# Patient Record
Sex: FEMALE | Race: WHITE | Employment: PART TIME | URBAN - METROPOLITAN AREA
[De-identification: names, ages, dates, MRNs, and addresses within clinical notes are randomized per-mention and may not be internally consistent; named-entity substitution may affect disease eponyms.]

---

## 2018-08-13 LAB — MAMMOGRAM: NORMAL

## 2018-12-21 NOTE — TELEPHONE ENCOUNTER
FUTURE VISIT INFORMATION      FUTURE VISIT INFORMATION:    Date: 12/28/18    Time: 0920    Location: [facility]TH[facility]  REFERRAL INFORMATION:    Referring provider:  DR CAAL    Referring providers clinic:  [facility]    Reason for visit/diagnosis  LBP     RECORDS REQUESTED FROM:       Clinic name Comments Records Status Imaging Status                                         RECORDS RECEIVED FROM: [facility]   DATE RECEIVED: 12/21/18   NOTES STATUS DETAILS   OFFICE NOTE from referring provider Care Everywhere 11/8/18*   OFFICE NOTE from other specialist N/A    DISCHARGE SUMMARY from hospital N/A    DISCHARGE REPORT from the ER N/A    OPERATIVE REPORT N/A    MEDICATION LIST Care Everywhere    IMPLANT RECORD/STICKER N/A    LABS     CBC/DIFF Care Everywhere    INJECTION In process 6/23/17*   US N/A    MRI Received 11/9/18*   CT SCAN N/A    XRAYS (IMAGES & REPORTS) N/A    TUMOR     PATHOLOGY  Slides & report N/A

## 2018-12-27 DIAGNOSIS — M54.50 LUMBAR PAIN: Primary | ICD-10-CM

## 2018-12-28 ENCOUNTER — OFFICE VISIT (OUTPATIENT)
Dept: ORTHOPEDICS | Facility: CLINIC | Age: 48
End: 2018-12-28
Payer: COMMERCIAL

## 2018-12-28 ENCOUNTER — HOSPITAL ENCOUNTER (INPATIENT)
Facility: CLINIC | Age: 48
Setting detail: SURGERY ADMIT
End: 2018-12-28
Attending: ORTHOPAEDIC SURGERY | Admitting: ORTHOPAEDIC SURGERY

## 2018-12-28 ENCOUNTER — ANCILLARY PROCEDURE (OUTPATIENT)
Dept: GENERAL RADIOLOGY | Facility: CLINIC | Age: 48
End: 2018-12-28
Attending: ORTHOPAEDIC SURGERY
Payer: COMMERCIAL

## 2018-12-28 ENCOUNTER — PRE VISIT (OUTPATIENT)
Dept: ORTHOPEDICS | Facility: CLINIC | Age: 48
End: 2018-12-28

## 2018-12-28 ENCOUNTER — DOCUMENTATION ONLY (OUTPATIENT)
Dept: ORTHOPEDICS | Facility: CLINIC | Age: 48
End: 2018-12-28

## 2018-12-28 ENCOUNTER — ANCILLARY PROCEDURE (OUTPATIENT)
Dept: CT IMAGING | Facility: CLINIC | Age: 48
End: 2018-12-28
Attending: ORTHOPAEDIC SURGERY
Payer: COMMERCIAL

## 2018-12-28 VITALS — WEIGHT: 188 LBS | BODY MASS INDEX: 29.51 KG/M2 | HEIGHT: 67 IN

## 2018-12-28 DIAGNOSIS — M43.10 DEGENERATIVE SPONDYLOLISTHESIS: Primary | ICD-10-CM

## 2018-12-28 DIAGNOSIS — M43.10 DEGENERATIVE SPONDYLOLISTHESIS: ICD-10-CM

## 2018-12-28 DIAGNOSIS — M48.062 SPINAL STENOSIS OF LUMBAR REGION WITH NEUROGENIC CLAUDICATION: ICD-10-CM

## 2018-12-28 LAB
MRSA DNA SPEC QL NAA+PROBE: NEGATIVE
SPECIMEN SOURCE: NORMAL

## 2018-12-28 PROCEDURE — 87640 STAPH A DNA AMP PROBE: CPT | Performed by: ORTHOPAEDIC SURGERY

## 2018-12-28 PROCEDURE — 87641 MR-STAPH DNA AMP PROBE: CPT | Performed by: ORTHOPAEDIC SURGERY

## 2018-12-28 RX ORDER — VENLAFAXINE HYDROCHLORIDE 150 MG/1
CAPSULE, EXTENDED RELEASE ORAL
COMMUNITY
Start: 2018-08-16

## 2018-12-28 RX ORDER — OXYCODONE AND ACETAMINOPHEN 5; 325 MG/1; MG/1
1 TABLET ORAL
COMMUNITY
Start: 2018-12-04 | End: 2019-02-15

## 2018-12-28 RX ORDER — GABAPENTIN 300 MG/1
300 CAPSULE ORAL EVERY EVENING
COMMUNITY
Start: 2018-10-25 | End: 2019-01-04

## 2018-12-28 RX ORDER — TRAZODONE HYDROCHLORIDE 50 MG/1
50 TABLET, FILM COATED ORAL AT BEDTIME
COMMUNITY
Start: 2018-10-25

## 2018-12-28 RX ORDER — CALCIUM CARBONATE 500(1250)
1 TABLET ORAL DAILY
Qty: 90 TABLET | Refills: 1 | Status: SHIPPED | OUTPATIENT
Start: 2018-12-28

## 2018-12-28 RX ORDER — VENLAFAXINE HYDROCHLORIDE 75 MG/1
CAPSULE, EXTENDED RELEASE ORAL
COMMUNITY
Start: 2018-08-16

## 2018-12-28 RX ORDER — IBUPROFEN 800 MG/1
800 TABLET, FILM COATED ORAL EVERY 8 HOURS PRN
Status: ON HOLD | COMMUNITY
Start: 2018-06-05 | End: 2019-03-08

## 2018-12-28 RX ORDER — FLUTICASONE PROPIONATE 50 MCG
1 SPRAY, SUSPENSION (ML) NASAL DAILY PRN
COMMUNITY
Start: 2017-09-19

## 2018-12-28 RX ORDER — TIZANIDINE 2 MG/1
4 TABLET ORAL AT BEDTIME
Status: ON HOLD | COMMUNITY
Start: 2018-10-22 | End: 2019-03-10

## 2018-12-28 RX ORDER — CHOLECALCIFEROL (VITAMIN D3) 50 MCG
2000 TABLET ORAL DAILY
Qty: 100 TABLET | Refills: 3 | Status: SHIPPED | OUTPATIENT
Start: 2018-12-28 | End: 2019-02-15

## 2018-12-28 RX ORDER — PROPRANOLOL HYDROCHLORIDE 120 MG/1
CAPSULE, EXTENDED RELEASE ORAL
COMMUNITY
Start: 2018-08-16 | End: 2019-01-04

## 2018-12-28 ASSESSMENT — ENCOUNTER SYMPTOMS
SINUS CONGESTION: 0
NERVOUS/ANXIOUS: 1
ARTHRALGIAS: 1
POLYPHAGIA: 0
EYE REDNESS: 0
EYE PAIN: 0
HOT FLASHES: 1
INSOMNIA: 1
DIFFICULTY URINATING: 1
TINGLING: 1
SEIZURES: 0
JOINT SWELLING: 0
MUSCLE WEAKNESS: 1
MUSCLE CRAMPS: 1
DIZZINESS: 1
WEAKNESS: 1
ALTERED TEMPERATURE REGULATION: 1
TASTE DISTURBANCE: 0
FEVER: 0
NIGHT SWEATS: 1
SORE THROAT: 0
FATIGUE: 1
DEPRESSION: 1
NECK MASS: 0
EYE WATERING: 0
WEIGHT LOSS: 0
TROUBLE SWALLOWING: 0
SMELL DISTURBANCE: 0
TREMORS: 0
DECREASED CONCENTRATION: 1
LOSS OF CONSCIOUSNESS: 0
STIFFNESS: 1
SINUS PAIN: 1
HOARSE VOICE: 0
PARALYSIS: 0
BACK PAIN: 1
CHILLS: 1
HALLUCINATIONS: 0
HEADACHES: 1
MYALGIAS: 1
DISTURBANCES IN COORDINATION: 1
DOUBLE VISION: 0
NECK PAIN: 0
NUMBNESS: 1
MEMORY LOSS: 1
FLANK PAIN: 0
INCREASED ENERGY: 1
WEIGHT GAIN: 1
HEMATURIA: 0
DYSURIA: 0
SPEECH CHANGE: 1
DECREASED APPETITE: 1
EYE IRRITATION: 1
POLYDIPSIA: 1
PANIC: 1

## 2018-12-28 ASSESSMENT — MIFFLIN-ST. JEOR: SCORE: 1514.35

## 2018-12-28 ASSESSMENT — PATIENT HEALTH QUESTIONNAIRE - PHQ9
SUM OF ALL RESPONSES TO PHQ QUESTIONS 1-9: 27
SUM OF ALL RESPONSES TO PHQ QUESTIONS 1-9: 27
10. IF YOU CHECKED OFF ANY PROBLEMS, HOW DIFFICULT HAVE THESE PROBLEMS MADE IT FOR YOU TO DO YOUR WORK, TAKE CARE OF THINGS AT HOME, OR GET ALONG WITH OTHER PEOPLE: EXTREMELY DIFFICULT

## 2018-12-28 NOTE — LETTER
12/28/2018       RE: Negrita Robledo  [address] [facility] [address] [address]     Dear Colleague,    Thank you for referring your patient, Negrita Robledo, to the [facility]. Please see a copy of my visit note below.    Spine Surgery Consultation    REFERRING PHYSICIAN: Referred Self   PRIMARY CARE PHYSICIAN: Rtea Castaneda           Chief Complaint:   Consult (chronic low back pain )      History of Present Illness:  Symptom Profile Including: location of symptoms, onset, severity, exacerbating/alleviating factors, previous treatments:        Negrita Robledo is a 48 year old female who presents with low back pain and bilateral L5 radicular symptoms which claudicate with ambulation.      15 years ago she was lifting a heavy bag of salt when she injured her back.  She recovered from this over the course of a few months but every 5 years she seems to aggravate the symptoms.  Over time she feels that the back pain as well as the weakness and numbness and tingling in her legs is progressing. She also finds that she has been tripping over her right foot and feels like she cannot clear the ground with the toes when she walks at times. She often has numbness and tingling and pain at night which wakes her up disrupts her sleep.  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  They have been allowing her to sit up for some of her work duties but this is still difficult.      She currently takes ibuprofen and gabapentin for pain and occasionally will take a Percocet at night to sleep.    She has trialed multiple courses of physical therapy (at [facility] in [place]) which have not been significantly helpful and she continues to do the exercises daily.  She has had multiple rounds of injections and while these have been somewhat helpful in the past the latest round was not helpful at all.       "   Past Medical History:   Ovarian cyst          Past Surgical History:   Tubal ligation            Social History:     Social History     Tobacco Use     Smoking status: Not on file   Substance Use Topics     Alcohol use: Not on file            Family History:   No family history on file.         Allergies:   Not on File         Medications:     Current Outpatient Medications   Medication     fluticasone (FLONASE) 50 MCG/ACT nasal spray     gabapentin (NEURONTIN) 300 MG capsule     ibuprofen (ADVIL/MOTRIN) 800 MG tablet     influenza quadrivalent, PF, vacc (FLUZONE QUADRIVALENT) 0.5 ML injection     Omega-3 Fatty Acids (FISH OIL PO)     oxyCODONE-acetaminophen (PERCOCET) 5-325 MG tablet     propranolol ER (INDERAL LA) 120 MG 24 hr capsule     tiZANidine (ZANAFLEX) 2 MG tablet     traZODone (DESYREL) 50 MG tablet     venlafaxine (EFFEXOR-XR) 150 MG 24 hr capsule     venlafaxine (EFFEXOR-XR) 75 MG 24 hr capsule     No current facility-administered medications for this visit.              Review of Systems:     A 10 point ROS was performed and reviewed. Specific responses to these questions are noted at the end of the document.         Physical Exam:   Vitals: Ht 1.708 m (5' 7.25\")   Wt 85.3 kg (188 lb)   BMI 29.23 kg/m        Lumbar Spine:    + straight leg raise bilaterally     Appearance - No gross stepoffs or deformities    Motor -     L2-3: Hip flexion 5/5 L and 5/5 R strength          L3/4:  Knee extension L 5/5 and R 5/5 strength         L4/5:  Foot dorsiflexion R 5/5 L 5/5 -al;though this fatigues somewhat to 4+/5 with repeated testing        EHL dorsiflexion R 4/5 L 5/5 strength         S1:  Plantarflexion/Peroneal Muscles  L 5/5 and R 5/5 strength    Sensation:      Left side- decreased sensation L4/L5/S1 on the left side. Intact L3.      Right side- decreased sensation L5, intact L3, L4, S1       Neurologic:      REFLEXES Left Right   Patella 1+ 1+   Ankle jerk 1+ 1+   Clonus 0 beats 0 beats     Hip " Exam:  No pain with hip log roll and no tenderness over the greater trochanters.    Alignment:  Patient stands with a neutral standing sagittal balance.         Imaging:   We ordered and independently reviewed new radiographs at this clinic visit. The results were discussed with the patient.  Findings include:    AP and lateral lumbar flexion-extension radiographs December 20, 2018 show transitional anatomy at the L5-S1 level.  I am calling the first mobile disc L4-5.  This level has a grade 1 spondylolisthesis with no significant change between flexion and extension.  Severe loss of disc space height at L4-5.  Hyperlordosis at the L3-4 segment to compensate for the loss of lordosis at L4-5.  Preserved overall alignment.      November 8, 2018 lumbar MRI: Again noted is the transitional anatomy consistent with the standing lumbar films, I am calling the spondylolisthesis level L4-5 and this level shows severe lateral recess stenosis in the setting of a grade 1 spondylolisthesis with mild to moderate adjacent segment changes at L3-4 but no severe neurologic compression.             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     Plan:  1. We discussed options moving forward.  At this point I think she has failed over 15 years of conservative management with a number of trials of physical therapy, multiple rounds of epidural injections and oral medications.  If she wished to pursue surgery I would recommend an L4-5 posterior interbody fusion with Bowden-Marks osteotomy for regional deformity correction and the use of Stealth guidance for instrumentation placement.  We would augment the fusion with iliac crest autograft to improve the healing rate.  Although she does have spondylosis at the L5-S1 level there is no lower neurologic compression and  no instability there, so I advised her that we should address only the area at L4-5 which is clearly her worst level of disease and there may be some residual back pain from the other areas of spondylosis, but given her young age I would like to try to avoid turning this into a multilevel fusion if possible and she understood and agreed with the rationale.  2. Risks of this surgery include risk of infection, risk of dural tear resulting in CSF leak which might result in headaches, or possible need for lumbar drain, or possible revision surgery in the setting of a persistent leak. Risk of hematoma or seroma resulting in wound complications.  Possible nerve root injury resulting in numbness weakness or paralysis into the arms or legs. Possible radiculitis which could result in similar symptoms or could result in significant neurogenic type pain. There is also a risk of delayed onset nerve root pals or radiculitis, which I explained is potentially due to stretch injury or possibly due to delayed onset swelling, and is sometimes temporary but can also be permanent.  Risk of incomplete decompression which might require revision surgery in the future.  Risk of adjacent segment problems requiring surgery in the future. Risk of incomplete relief of symptoms possibly requiring revision surgery in the future. Furthermore, although rare, there are risks of major vessel injury such as to the major vessels anteriorly or to the bowel from the surgery.  Sometimes this can happen if an instrument is passed anteriorly through the disc space. There is a risk of nonunion which might require revision surgery in the future, and risk of hardware complications from the instrumentation.  I do use imaging to help guide the placement of the instrumentation, but even with this there is a chance of implant malposition or problem.  There is a risk of blood clots in the legs or the lungs.  Lastly, although rare, there are certainly risks of the  anesthetic including stroke heart attack and death.    3. I explained that we used Stealth navigation with an O-Arm to place the instrumentation.  This is a form of CT-guided navigation for the screws.  We take an intraoperative CT scan which provides an accurate view of the bony anatomy and we then place the screws using the imaging guidance and then take a second CT scan to help verify the screw position. So the benefit of this technology is a high accuracy for screw placement.  4. We talked about the postoperative recovery period, the need for activity avoidance and the fact that it takes 6-12 months to get bony healing before she can return to full activities.  She does understand this and she would like to proceed.  We will try to get things arranged for her.    Respectfully,  Santy Goyal MD  Spine Surgery  [facility]

## 2018-12-28 NOTE — PROGRESS NOTES
"Spine Surgery Consultation    REFERRING PHYSICIAN: Referred Self   PRIMARY CARE PHYSICIAN: Reta Castaneda           Chief Complaint:   Consult (chronic low back pain )  Low back pain x 15 years.     History of Present Illness:  Symptom Profile Including: location of symptoms, onset, severity, exacerbating/alleviating factors, previous treatments:                 Past Medical History:   No past medical history on file.         Past Surgical History:   No past surgical history on file.         Social History:     Social History     Tobacco Use     Smoking status: Not on file   Substance Use Topics     Alcohol use: Not on file            Family History:   No family history on file.         Allergies:   Not on File         Medications:     Current Outpatient Medications   Medication     fluticasone (FLONASE) 50 MCG/ACT nasal spray     gabapentin (NEURONTIN) 300 MG capsule     ibuprofen (ADVIL/MOTRIN) 800 MG tablet     influenza quadrivalent, PF, vacc (FLUZONE QUADRIVALENT) 0.5 ML injection     Omega-3 Fatty Acids (FISH OIL PO)     oxyCODONE-acetaminophen (PERCOCET) 5-325 MG tablet     propranolol ER (INDERAL LA) 120 MG 24 hr capsule     tiZANidine (ZANAFLEX) 2 MG tablet     traZODone (DESYREL) 50 MG tablet     venlafaxine (EFFEXOR-XR) 150 MG 24 hr capsule     venlafaxine (EFFEXOR-XR) 75 MG 24 hr capsule     No current facility-administered medications for this visit.              Review of Systems:     A 10 point ROS was performed and reviewed. Specific responses to these questions are noted at the end of the document.         Physical Exam:   Vitals: Ht 1.708 m (5' 7.25\")   Wt 85.3 kg (188 lb)   BMI 29.23 kg/m    Constitutional: awake, alert, cooperative, no apparent distress, appears stated age.    Eyes: The sclera are white.  Ears, Nose, Throat: The trachea is midline.  Psychiatric: The patient has a normal affect.  Respiratory: breathing non-labored  Cardiovascular: The extremities are warm and perfused.  Skin: no " obvious rashes or lesions.  Musculoskeletal, Neurologic, and Spine:    Lumbar Spine:    Appearance - No gross stepoffs or deformities    Motor -     L2-3: Hip flexion 5/5 L and 5/5 R strength          L3/4:  Knee extension L 5/5 and R 5/5 strength         L4/5:  Foot dorsiflexion R 5/5 L 5/5 and       EHL dorsiflexion R /5 L 5/5 strength         S1:  Plantarflexion/Peroneal Muscles  L 5/5 and R 5/5 strength    Sensation:      Left side- decreased sensation L4/L5/S1 on the left side. Intact L3.      Right side- decreased sensation L5, intact L3, L4, S1       Neurologic:      REFLEXES Left Right   Patella 1+ 1+   Ankle jerk 1+ 1+   Clonus 0 beats 0 beats     Hip Exam:  No pain with hip log roll and no tenderness over the greater trochanters.    Alignment:  Patient stands with a neutral standing sagittal balance.             Imaging:   We ordered and independently reviewed new radiographs at this clinic visit. The results were discussed with the patient.  Findings include:    ***             Assessment and Plan:   Assessment:  48 year old female with ***     Plan:  1. ***      Respectfully,  Santy Goyal MD  Spine Surgery  [facility]

## 2018-12-28 NOTE — PROGRESS NOTES
Spine Surgery Consultation    REFERRING PHYSICIAN: Referred Self   PRIMARY CARE PHYSICIAN: Reta Castaneda           Chief Complaint:   Consult (chronic low back pain )      History of Present Illness:  Symptom Profile Including: location of symptoms, onset, severity, exacerbating/alleviating factors, previous treatments:        Negrita Robledo is a 48 year old female who presents with low back pain and bilateral L5 radicular symptoms which claudicate with ambulation.      15 years ago she was lifting a heavy bag of salt when she injured her back.  She recovered from this over the course of a few months but every 5 years she seems to aggravate the symptoms.  Over time she feels that the back pain as well as the weakness and numbness and tingling in her legs is progressing. She also finds that she has been tripping over her right foot and feels like she cannot clear the ground with the toes when she walks at times. She often has numbness and tingling and pain at night which wakes her up disrupts her sleep.  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  They have been allowing her to sit up for some of her work duties but this is still difficult.      She currently takes ibuprofen and gabapentin for pain and occasionally will take a Percocet at night to sleep.    She has trialed multiple courses of physical therapy (at [facility] in [place]) which have not been significantly helpful and she continues to do the exercises daily.  She has had multiple rounds of injections and while these have been somewhat helpful in the past the latest round was not helpful at all.         Past Medical History:   Ovarian cyst          Past Surgical History:   Tubal ligation            Social History:     Social History     Tobacco Use     Smoking status: Not on file   Substance Use Topics     Alcohol use: Not on file            Family History:   No family history  "on file.         Allergies:   Not on File         Medications:     Current Outpatient Medications   Medication     fluticasone (FLONASE) 50 MCG/ACT nasal spray     gabapentin (NEURONTIN) 300 MG capsule     ibuprofen (ADVIL/MOTRIN) 800 MG tablet     influenza quadrivalent, PF, vacc (FLUZONE QUADRIVALENT) 0.5 ML injection     Omega-3 Fatty Acids (FISH OIL PO)     oxyCODONE-acetaminophen (PERCOCET) 5-325 MG tablet     propranolol ER (INDERAL LA) 120 MG 24 hr capsule     tiZANidine (ZANAFLEX) 2 MG tablet     traZODone (DESYREL) 50 MG tablet     venlafaxine (EFFEXOR-XR) 150 MG 24 hr capsule     venlafaxine (EFFEXOR-XR) 75 MG 24 hr capsule     No current facility-administered medications for this visit.              Review of Systems:     A 10 point ROS was performed and reviewed. Specific responses to these questions are noted at the end of the document.         Physical Exam:   Vitals: Ht 1.708 m (5' 7.25\")   Wt 85.3 kg (188 lb)   BMI 29.23 kg/m       Lumbar Spine:    + straight leg raise bilaterally     Appearance - No gross stepoffs or deformities    Motor -     L2-3: Hip flexion 5/5 L and 5/5 R strength          L3/4:  Knee extension L 5/5 and R 5/5 strength         L4/5:  Foot dorsiflexion R 5/5 L 5/5 -al;though this fatigues somewhat to 4+/5 with repeated testing        EHL dorsiflexion R 4/5 L 5/5 strength         S1:  Plantarflexion/Peroneal Muscles  L 5/5 and R 5/5 strength    Sensation:      Left side- decreased sensation L4/L5/S1 on the left side. Intact L3.      Right side- decreased sensation L5, intact L3, L4, S1       Neurologic:      REFLEXES Left Right   Patella 1+ 1+   Ankle jerk 1+ 1+   Clonus 0 beats 0 beats     Hip Exam:  No pain with hip log roll and no tenderness over the greater trochanters.    Alignment:  Patient stands with a neutral standing sagittal balance.         Imaging:   We ordered and independently reviewed new radiographs at this clinic visit. The results were discussed with the " patient.  Findings include:    AP and lateral lumbar flexion-extension radiographs December 20, 2018 show transitional anatomy at the L5-S1 level.  I am calling the first mobile disc L4-5.  This level has a grade 1 spondylolisthesis with no significant change between flexion and extension.  Severe loss of disc space height at L4-5.  Hyperlordosis at the L3-4 segment to compensate for the loss of lordosis at L4-5.  Preserved overall alignment.      November 8, 2018 lumbar MRI: Again noted is the transitional anatomy consistent with the standing lumbar films, I am calling the spondylolisthesis level L4-5 and this level shows severe lateral recess stenosis in the setting of a grade 1 spondylolisthesis with mild to moderate adjacent segment changes at L3-4 but no severe neurologic compression.             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     Plan:  1. We discussed options moving forward.  At this point I think she has failed over 15 years of conservative management with a number of trials of physical therapy, multiple rounds of epidural injections and oral medications.  If she wished to pursue surgery I would recommend an L4-5 posterior interbody fusion with Bowden-Marks osteotomy for regional deformity correction and the use of Stealth guidance for instrumentation placement.  We would augment the fusion with iliac crest autograft to improve the healing rate.  Although she does have spondylosis at the L5-S1 level there is no lower neurologic compression and no instability there, so I advised her that we should address only the area at L4-5 which is clearly her worst level of disease and there may be some residual back pain from the other areas of spondylosis, but given her young age I would like to try to avoid turning this into a  multilevel fusion if possible and she understood and agreed with the rationale.  2. Risks of this surgery include risk of infection, risk of dural tear resulting in CSF leak which might result in headaches, or possible need for lumbar drain, or possible revision surgery in the setting of a persistent leak. Risk of hematoma or seroma resulting in wound complications.  Possible nerve root injury resulting in numbness weakness or paralysis into the arms or legs. Possible radiculitis which could result in similar symptoms or could result in significant neurogenic type pain. There is also a risk of delayed onset nerve root pals or radiculitis, which I explained is potentially due to stretch injury or possibly due to delayed onset swelling, and is sometimes temporary but can also be permanent.  Risk of incomplete decompression which might require revision surgery in the future.  Risk of adjacent segment problems requiring surgery in the future. Risk of incomplete relief of symptoms possibly requiring revision surgery in the future. Furthermore, although rare, there are risks of major vessel injury such as to the major vessels anteriorly or to the bowel from the surgery.  Sometimes this can happen if an instrument is passed anteriorly through the disc space. There is a risk of nonunion which might require revision surgery in the future, and risk of hardware complications from the instrumentation.  I do use imaging to help guide the placement of the instrumentation, but even with this there is a chance of implant malposition or problem.  There is a risk of blood clots in the legs or the lungs.  Lastly, although rare, there are certainly risks of the anesthetic including stroke heart attack and death.    3. I explained that we used Stealth navigation with an O-Arm to place the instrumentation.  This is a form of CT-guided navigation for the screws.  We take an intraoperative CT scan which provides an accurate view of the  bony anatomy and we then place the screws using the imaging guidance and then take a second CT scan to help verify the screw position. So the benefit of this technology is a high accuracy for screw placement.  4. We talked about the postoperative recovery period, the need for activity avoidance and the fact that it takes 6-12 months to get bony healing before she can return to full activities.  She does understand this and she would like to proceed.  We will try to get things arranged for her.      Respectfully,  Santy Goyal MD  Spine Surgery  [facility]      Answers for HPI/ROS submitted by the patient on 12/28/2018   General Symptoms: Yes  Skin Symptoms: No  HENT Symptoms: Yes  EYE SYMPTOMS: Yes  HEART SYMPTOMS: No  LUNG SYMPTOMS: No  INTESTINAL SYMPTOMS: No  URINARY SYMPTOMS: Yes  GYNECOLOGIC SYMPTOMS: Yes  BREAST SYMPTOMS: No  SKELETAL SYMPTOMS: Yes  BLOOD SYMPTOMS: No  NERVOUS SYSTEM SYMPTOMS: Yes  MENTAL HEALTH SYMPTOMS: Yes  Fever: No  Loss of appetite: Yes  Weight loss: No  Weight gain: Yes  Fatigue: Yes  Night sweats: Yes  Chills: Yes  Increased stress: Yes  Excessive hunger: No  Excessive thirst: Yes  Feeling hot or cold when others believe the temperature is normal: Yes  Loss of height: No  Post-operative complications: No  Surgical site pain: No  Hallucinations: No  Change in or Loss of Energy: Yes  Hyperactivity: No  Confusion: Yes  Ear pain: No  Hearing loss: Yes  Nosebleeds: No  Congestion: No  Sinus pain: Yes  Trouble swallowing: No   Voice hoarseness: No  Mouth sores: No  Sore throat: No  Tooth pain: No  Gum tenderness: Yes  Bleeding gums: Yes  Change in taste: No  Change in sense of smell: No  Dry mouth: Yes  Hearing aid used: No  Neck lump: No  Eye pain: No  Vision loss: Yes  Dry eyes: Yes  Watery eyes: No  Eye bulging: No  Double vision: No  Flashing of lights: No  Spots: No  Floaters: No  Redness: No  Crossed eyes: No  Tunnel Vision: No  Yellowing of eyes: No  Eye  irritation: Yes  Trouble holding urine or incontinence: No  Pain or burning: No  Trouble starting or stopping: No  Increased frequency of urination: Yes  Blood in urine: No  Decreased frequency of urination: No  Frequent nighttime urination: Yes  Flank pain: No  Difficulty emptying bladder: Yes  Back pain: Yes  Muscle aches: Yes  Neck pain: No  Swollen joints: No  Joint pain: Yes  Bone pain: No  Muscle cramps: Yes  Muscle weakness: Yes  Joint stiffness: Yes  Bone fracture: No  Trouble with coordination: Yes  Dizziness or trouble with balance: Yes  Fainting or black-out spells: No  Memory loss: Yes  Headache: Yes  Seizures: No  Speech problems: Yes  Tingling: Yes  Tremor: No  Weakness: Yes  Difficulty walking: Yes  Paralysis: No  Numbness: Yes  Bleeding or spotting between periods: No  Heavy or painful periods: Yes  Irregular periods: Yes  Hot flashes: Yes  Vaginal dryness: Yes  Painful intercourse: No  Difficulty with sexual arousal: No  Post-menopausal bleeding: No  Nervous or Anxious: Yes  Depression: Yes  Trouble sleeping: Yes  Trouble thinking or concentrating: Yes  Mood changes: Yes  Panic attacks: Yes  If you checked off any problems, how difficult have these problems made it for you to do your work, take care of things at home, or get along with other people?: Extremely difficult  PHQ9 TOTAL SCORE: 27

## 2018-12-28 NOTE — PROGRESS NOTES
Patient is scheduled for surgery with Dr. Goyal     Spoke or left message with: Patient    Date of Surgery: 1/4/19    Location: [place]    Post op: 6 weeks     Pre-op with surgeon (if applicable): 1/4/19    H&P: Scheduled with PAC 1/4/19    Additional imaging/appointments: CT scan completed today    Surgery packet: Received in clinic    Additional comments: Prior auth pending - patient understands surgery date contingent upon insurance approval

## 2018-12-28 NOTE — NURSING NOTE
"Reason For Visit:   Chief Complaint   Patient presents with     Consult     chronic low back pain        Primary MD: Reta Castaneda  Ref. MD: Self    ?  No  Occupation [employer] .  Currently working? Yes.  Work status?  Full time.  Date of injury: 2003  Type of injury: lifted a bag of solar salt wrong .  Date of surgery: none   Type of surgery: none, injections   Smoker: No  Request smoking cessation information: No    Ht 1.708 m (5' 7.25\")   Wt 85.3 kg (188 lb)   BMI 29.23 kg/m      Pain Assessment  Patient Currently in Pain: Yes  0-10 Pain Scale: 9  Primary Pain Location: Back  Pain Descriptors: Radiating    Oswestry (DOMO) Questionnaire    OSWESTRY DISABILITY INDEX 12/28/2018   Count 10   Sum 30   Oswestry Score (%) 60            Neck Disability Index (NDI) Questionnaire    No flowsheet data found.           Visual Analog Pain Scale  Back Pain Scale 0-10: 9  Right leg pain: 8  Left leg pain: 8    Promis 10 Assessment    No flowsheet data found.             Yang Kapoor, ATC  "

## 2018-12-29 ASSESSMENT — PATIENT HEALTH QUESTIONNAIRE - PHQ9: SUM OF ALL RESPONSES TO PHQ QUESTIONS 1-9: 27

## 2018-12-29 NOTE — NURSING NOTE
Teaching Flowsheet   Relevant Diagnosis: lumbar radiculopathy  Teaching Topic: preop L4-5 TLIF    Negrita lives alone in [place], has family nearby to help following surgery.  She works as a pharmaceutical tech.  She takes 1-2 Percocet/day for her pain.  She will have  CT done today, labs at her next visit.  She was given vitamin supplement recommendations.  MRSA swab for screening was sent to lab.  She will return for PAC and appt with Dr Goyal.     Person(s) involved in teaching:   Patient and friend     Motivation Level:  Asks Questions: Yes  Eager to Learn: Yes  Cooperative: Yes  Receptive (willing/able to accept information): Yes  Any cultural factors/Jehovah's witness beliefs that may influence understanding or compliance? No  Comments:      Patient and Family demonstrates understanding of the following:  Reason for the appointment, diagnosis and treatment plan: Yes  Knowledge of proper use of medications and conditions for which they are ordered (with special attention to potential side effects or drug interactions): Yes  Which situations necessitate calling provider and whom to contact: Yes       Teaching Concerns Addressed:   Comments:      Proper use and care of medications (medical equip, care aids, etc.): Yes  Nutritional needs and diet plan: Yes  Pain management techniques: Yes  Wound Care: Yes  How and/when to access community resources: Yes     Instructional Materials Used/Given: preop pkt, antiseptic soap     Time spent with patient: 30 minutes.

## 2019-01-04 ENCOUNTER — ANESTHESIA EVENT (OUTPATIENT)
Dept: SURGERY | Facility: CLINIC | Age: 49
End: 2019-01-04

## 2019-01-04 ENCOUNTER — OFFICE VISIT (OUTPATIENT)
Dept: SURGERY | Facility: CLINIC | Age: 49
End: 2019-01-04

## 2019-01-04 ENCOUNTER — OFFICE VISIT (OUTPATIENT)
Dept: ORTHOPEDICS | Facility: CLINIC | Age: 49
End: 2019-01-04

## 2019-01-04 VITALS — WEIGHT: 188 LBS | HEIGHT: 67 IN | BODY MASS INDEX: 29.51 KG/M2

## 2019-01-04 VITALS
HEIGHT: 67 IN | OXYGEN SATURATION: 97 % | TEMPERATURE: 98 F | WEIGHT: 190.5 LBS | BODY MASS INDEX: 29.9 KG/M2 | SYSTOLIC BLOOD PRESSURE: 110 MMHG | DIASTOLIC BLOOD PRESSURE: 63 MMHG | HEART RATE: 53 BPM

## 2019-01-04 DIAGNOSIS — M54.41 CHRONIC LOW BACK PAIN WITH BILATERAL SCIATICA, UNSPECIFIED BACK PAIN LATERALITY: ICD-10-CM

## 2019-01-04 DIAGNOSIS — Z01.818 PREOP EXAMINATION: ICD-10-CM

## 2019-01-04 DIAGNOSIS — M54.42 CHRONIC LOW BACK PAIN WITH BILATERAL SCIATICA, UNSPECIFIED BACK PAIN LATERALITY: ICD-10-CM

## 2019-01-04 DIAGNOSIS — G89.29 CHRONIC LOW BACK PAIN WITH BILATERAL SCIATICA, UNSPECIFIED BACK PAIN LATERALITY: ICD-10-CM

## 2019-01-04 DIAGNOSIS — M48.062 SPINAL STENOSIS OF LUMBAR REGION WITH NEUROGENIC CLAUDICATION: Primary | ICD-10-CM

## 2019-01-04 DIAGNOSIS — Z01.818 PREOP EXAMINATION: Primary | ICD-10-CM

## 2019-01-04 LAB
ALBUMIN UR-MCNC: NEGATIVE MG/DL
ANION GAP SERPL CALCULATED.3IONS-SCNC: 8 MMOL/L (ref 3–14)
APPEARANCE UR: CLEAR
BILIRUB UR QL STRIP: NEGATIVE
BUN SERPL-MCNC: 14 MG/DL (ref 7–30)
CALCIUM SERPL-MCNC: 8.8 MG/DL (ref 8.5–10.1)
CHLORIDE SERPL-SCNC: 106 MMOL/L (ref 94–109)
CO2 SERPL-SCNC: 24 MMOL/L (ref 20–32)
COLOR UR AUTO: YELLOW
CREAT SERPL-MCNC: 0.67 MG/DL (ref 0.52–1.04)
ERYTHROCYTE [DISTWIDTH] IN BLOOD BY AUTOMATED COUNT: 13.2 % (ref 10–15)
GFR SERPL CREATININE-BSD FRML MDRD: >90 ML/MIN/{1.73_M2}
GLUCOSE SERPL-MCNC: 86 MG/DL (ref 70–99)
GLUCOSE UR STRIP-MCNC: NEGATIVE MG/DL
HCT VFR BLD AUTO: 40.4 % (ref 35–47)
HGB BLD-MCNC: 13.1 G/DL (ref 11.7–15.7)
HGB UR QL STRIP: NEGATIVE
KETONES UR STRIP-MCNC: NEGATIVE MG/DL
LEUKOCYTE ESTERASE UR QL STRIP: NEGATIVE
MCH RBC QN AUTO: 29.4 PG (ref 26.5–33)
MCHC RBC AUTO-ENTMCNC: 32.4 G/DL (ref 31.5–36.5)
MCV RBC AUTO: 91 FL (ref 78–100)
NITRATE UR QL: NEGATIVE
NT-PROBNP SERPL-MCNC: 269 PG/ML (ref 0–125)
PH UR STRIP: 6 PH (ref 5–7)
PLATELET # BLD AUTO: 226 10E9/L (ref 150–450)
POTASSIUM SERPL-SCNC: 4.2 MMOL/L (ref 3.4–5.3)
RBC # BLD AUTO: 4.45 10E12/L (ref 3.8–5.2)
SODIUM SERPL-SCNC: 137 MMOL/L (ref 133–144)
SOURCE: NORMAL
SP GR UR STRIP: 1.01 (ref 1–1.03)
UROBILINOGEN UR STRIP-MCNC: 2 MG/DL (ref 0–2)
WBC # BLD AUTO: 9.4 10E9/L (ref 4–11)

## 2019-01-04 RX ORDER — GABAPENTIN 300 MG/1
900 CAPSULE ORAL AT BEDTIME
COMMUNITY
Start: 2019-01-04

## 2019-01-04 RX ORDER — PROPRANOLOL HYDROCHLORIDE 120 MG/1
120 CAPSULE, EXTENDED RELEASE ORAL DAILY
COMMUNITY
Start: 2019-01-04

## 2019-01-04 SDOH — HEALTH STABILITY: MENTAL HEALTH: HOW OFTEN DO YOU HAVE A DRINK CONTAINING ALCOHOL?: MONTHLY OR LESS

## 2019-01-04 ASSESSMENT — ENCOUNTER SYMPTOMS
SEIZURES: 0
POLYDIPSIA: 1
SWOLLEN GLANDS: 0
HEARTBURN: 0
BRUISES/BLEEDS EASILY: 1
EYE PAIN: 0
SPEECH CHANGE: 1
BLOATING: 1
DIARRHEA: 1
WEIGHT GAIN: 1
MUSCLE CRAMPS: 1
PANIC: 1
BLOOD IN STOOL: 0
HALLUCINATIONS: 0
INCREASED ENERGY: 1
FATIGUE: 1
POLYPHAGIA: 0
JAUNDICE: 0
DIZZINESS: 1
TASTE DISTURBANCE: 1
CHILLS: 1
EYE WATERING: 0
NAUSEA: 1
STIFFNESS: 1
TINGLING: 1
DYSURIA: 0
NIGHT SWEATS: 1
MEMORY LOSS: 1
CONSTIPATION: 1
PARALYSIS: 0
EYE REDNESS: 0
NECK MASS: 0
EYE IRRITATION: 1
ABDOMINAL PAIN: 0
DISTURBANCES IN COORDINATION: 1
DIFFICULTY URINATING: 1
LOSS OF CONSCIOUSNESS: 1
DEPRESSION: 1
HOT FLASHES: 1
DECREASED LIBIDO: 0
DOUBLE VISION: 0
NERVOUS/ANXIOUS: 1
DECREASED CONCENTRATION: 1
NECK PAIN: 1
SORE THROAT: 0
SMELL DISTURBANCE: 0
MUSCLE WEAKNESS: 1
DECREASED APPETITE: 1
WEIGHT LOSS: 0
SINUS CONGESTION: 1
JOINT SWELLING: 1
BOWEL INCONTINENCE: 0
VOMITING: 0
FEVER: 0
FLANK PAIN: 0
HOARSE VOICE: 0
WEAKNESS: 1
TROUBLE SWALLOWING: 0
INSOMNIA: 1
SINUS PAIN: 0
BACK PAIN: 1
HEMATURIA: 0
ALTERED TEMPERATURE REGULATION: 1
ARTHRALGIAS: 1
TREMORS: 0
NUMBNESS: 1
HEADACHES: 1
MYALGIAS: 1
RECTAL PAIN: 0

## 2019-01-04 ASSESSMENT — MIFFLIN-ST. JEOR
SCORE: 1514.35
SCORE: 1521.73

## 2019-01-04 NOTE — NURSING NOTE
"Reason For Visit:   Chief Complaint   Patient presents with     RECHECK     CT review and Discuss surgery DOS:1/14/19       Ht 1.708 m (5' 7.25\")   Wt 85.3 kg (188 lb)   BMI 29.23 kg/m           Yang Kapoor ATC  "

## 2019-01-04 NOTE — H&P
Pre-Operative H & P     CC:  Preoperative exam to assess for increased cardiopulmonary risk while undergoing surgery and anesthesia.    Date of Encounter: 1/4/2019  Primary Care Physician:  Reta Castaneda  Negrita Robledo is a 48 year old female who presents for pre-operative H & P in preparation for a Lumbar 4-5 Transforaminal Lumbar Interbody Fusion (N/A Spine) Iliac Crest Autograft (Hip) with Dr. Goyal on 1/14/2019 at [facility].       Negrita Robledo is a 48 year old female with hyperlipidemia, allergic rhinitis and anxiety that has a chronic low back pain, lumbar stenosis and neurogenic claudications.  She reports having back pain even since having a lifting injury in 2003.  At first the pain and radicular symptoms were recurrent intermittently, but over time the pain and radicular symptoms have worsened and become pretty much constant.  She reports having symptoms down both legs.  Her gait has become impaired and she feels unsteady.  She has attempted multiple injections, multiple courses of physical therapy, NSAIDS, gabapentin and percocet for treatment, but the symptoms persist.  She has consulted with Dr. Goyal and the above listed surgery has been recommended for treatment.     History is obtained from the patient and the medical record.     Past Medical History  Past Medical History:   Diagnosis Date     Allergic rhinitis      PONV (postoperative nausea and vomiting)        Past Surgical History  Past Surgical History:   Procedure Laterality Date     TONSILLECTOMY & ADENOIDECTOMY       TUBAL LIGATION       TYMPANOSTOMY, LOCAL/TOPICAL ANESTHESIA         Hx of Blood transfusions/reactions: none    Hx of abnormal bleeding or anti-platelet use: none    Menstrual history: Patient's last menstrual period was 12/20/2018 (exact date).:     Steroid use in the last year: She had a short course of prednisone and a medrol dosepak 1 month ago.  No long term  steroids.     Personal or FH with difficulty with Anesthesia:  Personal history of PONV and upper torso muscle tension post-op.  No known family history of any anesthesia complications.      Prior to Admission Medications  Current Outpatient Medications   Medication Sig Dispense Refill     calcium carbonate (OS-SANDRA) 500 MG tablet Take 1 tablet (500 mg) by mouth daily (Patient taking differently: Take 1 tablet by mouth every morning ) 90 tablet 1     fluticasone (FLONASE) 50 MCG/ACT nasal spray Spray 1 spray in nostril as needed       gabapentin (NEURONTIN) 300 MG capsule Take 3 capsules (900 mg) by mouth every evening       ibuprofen (ADVIL/MOTRIN) 800 MG tablet Take 800 mg by mouth as needed       influenza quadrivalent, PF, vacc (FLUZONE QUADRIVALENT) 0.5 ML injection        Omega-3 Fatty Acids (FISH OIL PO) Take 1 capsule by mouth every morning        oxyCODONE-acetaminophen (PERCOCET) 5-325 MG tablet Take 1 tablet by mouth       propranolol ER (INDERAL LA) 120 MG 24 hr capsule Take 1 capsule (120 mg) by mouth daily For anxiety management       tiZANidine (ZANAFLEX) 2 MG tablet        traZODone (DESYREL) 50 MG tablet Take 50 mg by mouth At Bedtime        venlafaxine (EFFEXOR-XR) 150 MG 24 hr capsule TAKE ONE CAPSULE BY MOUTH ONCE DAILY IN  THE  MORNING  TO  IMPROVE  MOODS       venlafaxine (EFFEXOR-XR) 75 MG 24 hr capsule TAKE ONE CAPSULE BY MOUTH ONCE DAILY WITH MEALS FOR  MOOD  -  TAKE  THIS  WITH  THE  150  MG  CAPSULE       vitamin D3 (CHOLECALCIFEROL) 2000 units tablet Take 1 tablet by mouth daily (Patient taking differently: Take 2,000 Units by mouth every morning ) 100 tablet 3       Allergies  Not on File    Social History  Social History     Socioeconomic History     Marital status: Single     Spouse name: Not on file     Number of children: 0     Years of education: Not on file     Highest education level: Not on file   Social Needs     Financial resource strain: Not on file     Food insecurity - worry:  "Not on file     Food insecurity - inability: Not on file     Transportation needs - medical: Not on file     Transportation needs - non-medical: Not on file   Occupational History     Occupation: pharmacy tech   Tobacco Use     Smoking status: Never Smoker     Smokeless tobacco: Never Used   Substance and Sexual Activity     Alcohol use: Yes     Frequency: Monthly or less     Comment: rare     Drug use: No     Sexual activity: Not on file   Other Topics Concern     Not on file   Social History Narrative     Not on file       Family History  Family History   Problem Relation Age of Onset     Anxiety Disorder Mother      Hyperlipidemia Mother      Depression Mother      Unknown/Adopted Father      Cancer Paternal Grandmother         unknown type      No Known Problems Half-Brother      Cancer Paternal Grandfather         unknown type     Unknown/Adopted Maternal Grandmother      Unknown/Adopted Maternal Grandfather          ROS/MED HX  The complete review of systems is negative other than noted in the HPI or here.   ENT/Pulmonary:     (+)allergic rhinitis, , . .    Neurologic:       Cardiovascular:     (+) Dyslipidemia, ----. : . . . :. . No previous cardiac testing       METS/Exercise Tolerance:  3 - Able to walk 1-2 blocks without stopping   Hematologic:  - neg hematologic  ROS       Musculoskeletal:   (+) arthritis (knees), , , other musculoskeletal- chronic low back pain, lumbar stenosis, neurogenic claudication. neck pain      GI/Hepatic:  - neg GI/hepatic ROS       Renal/Genitourinary:  - ROS Renal section negative       Endo:  - neg endo ROS       Psychiatric:     (+) psychiatric history anxiety      Infectious Disease:  - neg infectious disease ROS       Malignancy:      - no malignancy   Other:    (+) No chance of pregnancy H/O Chronic Pain,H/O chronic opiod use ,                    Temp: 98  F (36.7  C) Temp src: Oral BP: 110/63 Pulse: 53     SpO2: 97 %         190 lbs 8 oz  5' 7\"   Body mass index is 29.84 " kg/m .       Physical Exam  Constitutional: Awake, alert, cooperative, no apparent distress, and appears stated age.  Eyes: Pupils equal, round and reactive to light, extra ocular muscles intact, sclera clear, conjunctiva normal.  HENT: Normocephalic, oral pharynx with moist mucus membranes, good dentition. No goiter appreciated.   Respiratory: Clear to auscultation bilaterally, no crackles or wheezing.  Cardiovascular: Regular rate and rhythm, normal S1 and S2, and no murmur noted.  Carotids +2, no bruits. No edema. Palpable pulses to radial  DP and PT arteries.   GI: Normal bowel sounds, soft, non-distended, non-tender, no masses palpated, no hepatosplenomegaly.    Lymph/Hematologic: No cervical lymphadenopathy and no supraclavicular lymphadenopathy.  Genitourinary:  deferred  Skin: Warm and dry.  No rashes at anticipated surgical site.   Musculoskeletal: Full ROM of neck. There is no redness, warmth, or swelling of the exposed joints. Gross motor strength is normal.    Neurologic: Awake, alert, oriented to name, place and time. Cranial nerves II-XII are grossly intact. Gait is impaired.   Neuropsychiatric: Calm, cooperative. Normal affect.     Labs: (personally reviewed)  Component      Latest Ref Rng & Units 1/4/2019   Color Urine       Yellow   Appearance Urine       Clear   Glucose Urine      NEG:Negative mg/dL Negative   Bilirubin Urine      NEG:Negative Negative   Ketones Urine      NEG:Negative mg/dL Negative   Specific Gravity Urine      1.003 - 1.035 1.013   Blood Urine      NEG:Negative Negative   pH Urine      5.0 - 7.0 pH 6.0   Protein Albumin Urine      NEG:Negative mg/dL Negative   Urobilinogen mg/dL      0.0 - 2.0 mg/dL 2.0   Nitrite Urine      NEG:Negative Negative   Leukocyte Esterase Urine      NEG:Negative Negative   Source       Midstream Urine   Sodium      133 - 144 mmol/L 137   Potassium      3.4 - 5.3 mmol/L 4.2   Chloride      94 - 109 mmol/L 106   Carbon Dioxide      20 - 32 mmol/L 24    Anion Gap      3 - 14 mmol/L 8   Glucose      70 - 99 mg/dL 86   Urea Nitrogen      7 - 30 mg/dL 14   Creatinine      0.52 - 1.04 mg/dL 0.67   GFR Estimate      >60 mL/min/1.73:m2 >90   GFR Estimate If Black      >60 mL/min/1.73:m2 >90   Calcium      8.5 - 10.1 mg/dL 8.8   WBC      4.0 - 11.0 10e9/L 9.4   RBC Count      3.8 - 5.2 10e12/L 4.45   Hemoglobin      11.7 - 15.7 g/dL 13.1   Hematocrit      35.0 - 47.0 % 40.4   MCV      78 - 100 fl 91   MCH      26.5 - 33.0 pg 29.4   MCHC      31.5 - 36.5 g/dL 32.4   RDW      10.0 - 15.0 % 13.2   Platelet Count      150 - 450 10e9/L 226   N-Terminal Pro Bnp      0 - 125 pg/mL 269 (H)         Outside records reviewed from: Care Everywhere          ASSESSMENT and PLAN  Negrita Robledo is a 48 year old female scheduled for a Lumbar 4-5 Transforaminal Lumbar Interbody Fusion (N/A Spine) Iliac Crest Autograft (Hip) on 1/14/2019 by Dr. Goyal in treatment of lumbar stenosis and neurogenic claudication with chronic low back pain.  PAC referral for risk assessment and optimization for anesthesia with comorbid conditions of: hyperlipidemia, allergic rhinitis and anxiety.    Pre-operative considerations:  1.  Cardiac:  Functional status- METS 3.  She hasn't been able to be very active due to her chronic low back pain, but reports she can walk a block without stopping to rest.  BNP today is WNL for age.  Intermediate risk surgery with 0.4% risk of major adverse cardiac event.   2.  Pulm:  Airway feasible.  ROBERTO risk: low.  Never smoker.   3.  GI:  Risk of PONV score = 4.  If > 2, anti-emetic intervention recommended.  She has history of PONV.  PONV prevention measures as per anesthesia DOS.  4. Musculoskeletal:  Her gait is mildly impaired.  Consider fall risk precautions.  She takes 1-3 tablets of percocet per day for chronic back pain.  Morphine equivalent dosing = 7.5-22.5mg.     VTE risk: 0.26%    Patient is optimized and is acceptable candidate for the proposed procedure.  No  further diagnostic evaluation is needed.     Patient discussed with Dr. Conte.             Della Baig, DNP, RN, APRN  Preoperative Assessment Center  [facility]  Phone: [phone]  Fax: [phone]

## 2019-01-04 NOTE — LETTER
1/4/2019       RE: Negrita Robledo  [address]  [place] [address]     Dear Colleague,    Thank you for referring your patient, Negrita Robledo, to the [facility]. Please see a copy of my visit note below.        Spine Surgery Return Clinic Visit    Chief Complaint:   RECHECK (CT review and Discuss surgery DOS:1/14/19)    Interval HPI:  Symptom Profile Including: location of symptoms, onset, severity, exacerbating/alleviating factors, previous treatments:        Negrita Robledo is a 48 year old female who presents with low back pain and bilateral L5 radicular symptoms which claudicate with ambulation.       15 years ago she was lifting a heavy bag of salt when she injured her back.  She recovered from this over the course of a few months but every 5 years she seems to aggravate the symptoms.  Over time she feels that the back pain as well as the weakness and numbness and tingling in her legs is progressing. She also finds that she has been tripping over her right foot and feels like she cannot clear the ground with the toes when she walks at times. She often has numbness and tingling and pain at night which wakes her up disrupts her sleep.  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  They have been allowing her to sit up for some of her work duties but this is still difficult.       She currently takes ibuprofen and gabapentin for pain and occasionally will take a Percocet at night to sleep.    She has trialed multiple courses of physical therapy (at [facility] in [place]) which have not been significantly helpful and she continues to do the exercises daily.  She has had multiple rounds of injections and while these have been somewhat helpful in the past the latest round was not helpful at all.    At her last visit she and I discussed a possible lumbar  fusion type procedure and she returns today for final imaging review and surgical decision making.         Past Medical History:     Past Medical History:   Diagnosis Date     Allergic rhinitis      PONV (postoperative nausea and vomiting)           Past Surgical History:     Past Surgical History:   Procedure Laterality Date     TONSILLECTOMY & ADENOIDECTOMY       TUBAL LIGATION       TYMPANOSTOMY, LOCAL/TOPICAL ANESTHESIA            Social History:     Social History     Tobacco Use     Smoking status: Never Smoker     Smokeless tobacco: Never Used   Substance Use Topics     Alcohol use: Yes     Frequency: Monthly or less     Comment: rare          Family History:     Family History   Problem Relation Age of Onset     Anxiety Disorder Mother      Hyperlipidemia Mother      Depression Mother      Unknown/Adopted Father      Cancer Paternal Grandmother         unknown type      No Known Problems Half-Brother      Cancer Paternal Grandfather         unknown type     Unknown/Adopted Maternal Grandmother      Unknown/Adopted Maternal Grandfather           Allergies:   Not on File         Medications:     Current Outpatient Medications   Medication     calcium carbonate (OS-SANDRA) 500 MG tablet     fluticasone (FLONASE) 50 MCG/ACT nasal spray     gabapentin (NEURONTIN) 300 MG capsule     ibuprofen (ADVIL/MOTRIN) 800 MG tablet     influenza quadrivalent, PF, vacc (FLUZONE QUADRIVALENT) 0.5 ML injection     Omega-3 Fatty Acids (FISH OIL PO)     oxyCODONE-acetaminophen (PERCOCET) 5-325 MG tablet     propranolol ER (INDERAL LA) 120 MG 24 hr capsule     tiZANidine (ZANAFLEX) 2 MG tablet     traZODone (DESYREL) 50 MG tablet     venlafaxine (EFFEXOR-XR) 150 MG 24 hr capsule     venlafaxine (EFFEXOR-XR) 75 MG 24 hr capsule     vitamin D3 (CHOLECALCIFEROL) 2000 units tablet     No current facility-administered medications for this visit.           Review of Systems:   A focused musculoskeletal and neurologic ROS was performed with  "pertinent positives and negatives noted in the HPI.  Additional systems were also reviewed and are documented at the bottom of the note.         Physical Exam:   Vitals: Ht 1.708 m (5' 7.25\")   Wt 85.3 kg (188 lb)   LMP 12/20/2018 (Exact Date)   BMI 29.23 kg/m     Musculoskeletal, Neurologic, and Spine:   Lumbar Spine:                              + straight leg raise bilaterally                               Appearance - No gross stepoffs or deformities                              Motor -                               L2-3: Hip flexion 5/5 L and 5/5 R strength                               L3/4:  Knee extension L 5/5 and R 5/5 strength                              L4/5:  Foot dorsiflexion R 5/5 L 5/5 -al;though this fatigues somewhat to 4+/5 with repeated testing                                              EHL dorsiflexion R 4/5 L 5/5 strength                              S1:  Plantarflexion/Peroneal Muscles  L 5/5 and R 5/5 strength                              Sensation:                                             Left side- decreased sensation L4/L5/S1 on the left side. Intact L3.                                             Right side- decreased sensation L5, intact L3, L4, S1                                  Neurologic:                                REFLEXES Left Right   Patella 1+ 1+   Ankle jerk 1+ 1+   Clonus 0 beats 0 beats      Hip Exam:  No pain with hip log roll and no tenderness over the greater trochanters.     Alignment:  Patient stands with a neutral standing sagittal balance.          Imaging:   We ordered and independently reviewed new radiographs at this clinic visit. The results were discussed with the patient. Findings include:    AP and lateral lumbar flexion-extension radiographs December 20, 2018 show transitional anatomy at the L5-S1 level.  I am calling the first mobile disc L4-5.  This level has a grade 1 spondylolisthesis with no significant change between flexion and extension.  " Severe loss of disc space height at L4-5.  Hyperlordosis at the L3-4 segment to compensate for the loss of lordosis at L4-5.  Preserved overall alignment.       November 8, 2018 lumbar MRI: Again noted is the transitional anatomy consistent with the standing lumbar films, I am calling the spondylolisthesis level L4-5 and this level shows severe lateral recess stenosis in the setting of a grade 1 spondylolisthesis with mild to moderate adjacent segment changes at L3-4 but no severe neurologic compression.    Lumbar CT scan December 28, 2018 show severe spondylosis L4-5 and L5-S1 with complete disc space height loss and vacuum disc phenomena at each level.  Trace anterolisthesis L4 on L5.     Assessment and Plan:     48 year old female with transitional anatomy and a grade 1 spondylolisthesis at L4-5 with lateral recess stenosis and L5-S1 spondylosis.       Plan:  1. We discussed options moving forward.  At this point I think she has failed over 15 years of conservative management with a number of trials of physical therapy, multiple rounds of epidural injections and oral medications.  If she wished to pursue surgery I would recommend an L4-S1 posterior interbody fusion with Bowden-Marks osteotomy for regional deformity correction and the use of Stealth guidance for instrumentation placement.  We would augment the fusion with iliac crest autograft to improve the healing rate.     After her last visit we discussed possibly just doing L4-5 level, but based on the level of spondylosis at L5-S1 I think that should also be included.  Furthermore, she does have a regional deformity with hypolordosis across these levels and some compensatory hyperlordosis at the L3-4 level.  I think we can get better regional deformity correction if the L5-S1 level was included in this might help unload the adjacent segment facets at L3-4.  2. Risks of this surgery include risk of infection, risk of dural tear resulting in CSF leak which  might result in headaches, or possible need for lumbar drain, or possible revision surgery in the setting of a persistent leak. Risk of hematoma or seroma resulting in wound complications.  Possible nerve root injury resulting in numbness weakness or paralysis into the arms or legs. Possible radiculitis which could result in similar symptoms or could result in significant neurogenic type pain. There is also a risk of delayed onset nerve root pals or radiculitis, which I explained is potentially due to stretch injury or possibly due to delayed onset swelling, and is sometimes temporary but can also be permanent.  Risk of incomplete decompression which might require revision surgery in the future.  Risk of adjacent segment problems requiring surgery in the future. Risk of incomplete relief of symptoms possibly requiring revision surgery in the future. Furthermore, although rare, there are risks of major vessel injury such as to the major vessels anteriorly or to the bowel from the surgery.  Sometimes this can happen if an instrument is passed anteriorly through the disc space. There is a risk of nonunion which might require revision surgery in the future, and risk of hardware complications from the instrumentation.  I do use imaging to help guide the placement of the instrumentation, but even with this there is a chance of implant malposition or problem.  There is a risk of blood clots in the legs or the lungs.  Lastly, although rare, there are certainly risks of the anesthetic including stroke heart attack and death.    3. I explained that we used Stealth navigation with an O-Arm to place the instrumentation.  This is a form of CT-guided navigation for the screws.  We take an intraoperative CT scan which provides an accurate view of the bony anatomy and we then place the screws using the imaging guidance and then take a second CT scan to help verify the screw position. So the benefit of this technology is a high  accuracy for screw placement.  4. We talked about the postoperative recovery period, the need for activity avoidance and the fact that it takes 6-12 months to get bony healing before she can return to full activities.  She does understand this and she would like to proceed.  We will try to get things arranged for her.  5. She does have some insurance issues and she is going to work on getting these things worked out through our financial office and we will try to get things scheduled for her when appropriate.    Respectfully,  Santy Goyal MD  Spine Surgery  [facility]

## 2019-01-04 NOTE — PATIENT INSTRUCTIONS
Preparing for Your Surgery      Name:  Negrita Robledo   MRN:  [id number]   :  1970   Today's Date:  2019     Arriving for surgery:  Surgery date:  19  Arrival time:  9:15AM  Please come to:     [facility] [address]  [address] [place]  [address]    - parking is available in front of [facility] from 5:15AM to 8:00PM. If you prefer, park your car in the Green Lot.    -Proceed to the 3rd floor, check in at the Adult Surgery Waiting Lounge. [phone]    If an escort is needed stop at the Information Desk in the lobby. Inform the information person that you are here for surgery. An escort to the Adult Surgery Waiting Lounge will be provided.        What can I eat or drink?  -  You may have solid food or milk products until 8 hours prior to your surgery. 19, 3:45AM  -  You may have water, apple juice or 7up/Sprite until 2 hours prior to your surgery. 19, 9:15AM    Which medicines can I take?  Stop Aspirin, multivitamins and Fish oil one week prior to surgery.  Hold Ibuprofen and Naproxen for 24 hours prior to surgery.   -  Do NOT take these medications in the morning, the day of surgery:    Calcium   Vitamin D3    -  Please take these medications the day of surgery:    Propranolol(Inderal)  Venlafaxine(Effexor)     -As Needed:   Flonase Nasal spray  Oxycodone(Percocet)       How do I prepare myself?  -  Take two showers: one the night before surgery; and one the morning of surgery.         Use Scrubcare or Hibiclens to wash from neck down.  You may use your own shampoo and conditioner. No other hair products.   -  Do NOT use lotion, powder, deodorant, or antiperspirant the day of your surgery.  -  Do NOT wear any makeup, fingernail polish or jewelry.  -  Begin using Incentive Spirometer 1 week prior to surgery.  Use 4 times per day, up to 5 breaths each time.  Bring Incentive Spirometer to hospital.  - Do not bring your own  medications to the hospital, except for inhalers and eye drops.  -  Bring your ID and insurance card.    Questions or Concerns:  -If you have questions or concerns regarding the day of surgery, please call [phone].     -For questions after surgery please call your surgeons office.           AFTER YOUR SURGERY  Breathing exercises   Breathing exercises help you recover faster. Take deep breaths and let the air out slowly. This will:     Help you wake up after surgery.    Help prevent complications like pneumonia.  Preventing complications will help you go home sooner.   We may give you a breathing device (incentive spirometer) to encourage you to breathe deeply.   Nausea and vomiting   You may feel sick to your stomach after surgery; if so, let your nurse know.    Pain control:  After surgery, you may have pain. Our goal is to help you manage your pain. Pain medicine will help you feel comfortable enough to do activities that will help you heal.  These activities may include breathing exercises, walking and physical therapy.   To help your health care team treat your pain we will ask: 1) If you have pain  2) where it is located 3) describe your pain in your words  Methods of pain control include medications given by mouth, vein or by nerve block for some surgeries.  Sequential Compression Device (SCD) or Pneumo Boots:  You may need to wear SCD S on your legs or feet. These are wraps connected to a machine that pumps in air and releases it. The repeated pumping helps prevent blood clots from forming.     Using an Incentive Spirometer    An incentive spirometer is a device that helps you do deep breathing exercises. These exercises expand your lungs, aid in circulation, and help prevent pneumonia. Deep breathing exercises also help you breathe better and improve the function of your lungs by:    Keeping your lungs clear    Strengthening your breathing muscles    Helping prevent respiratory complications or  problems  The incentive spirometer gives you a way to take an active part in your care. A nurse or therapist will teach you breathing exercises. To do these exercises, you will breathe in through your mouth and not your nose. The incentive spirometer only works correctly if you breathe in through your mouth.    Steps to clear lungs  Step 1. Exhale normally. Then, inhale normally.    Relax and breathe out.  Step 2. Place your lips tightly around the mouthpiece.    Make sure the device is upright and not tilted.  Step 3. Inhale as much air as you can through the mouthpiece (don't breath through your nose).    Inhale slowly and deeply.    Hold your breath long enough to keep the balls or disk raised for at least 3 to 5 seconds, or as instructed by your healthcare provider.  Step 4. Repeat the exercise regularly.  Begin using the Incentive Spirometer one week prior to your surgery, 4 times per day-5 times each.

## 2019-01-04 NOTE — PROGRESS NOTES
Spine Surgery Return Clinic Visit      Chief Complaint:   RECHECK (CT review and Discuss surgery DOS:1/14/19)      Interval HPI:  Symptom Profile Including: location of symptoms, onset, severity, exacerbating/alleviating factors, previous treatments:        Negrita Robledo is a 48 year old female who presents with low back pain and bilateral L5 radicular symptoms which claudicate with ambulation.       15 years ago she was lifting a heavy bag of salt when she injured her back.  She recovered from this over the course of a few months but every 5 years she seems to aggravate the symptoms.  Over time she feels that the back pain as well as the weakness and numbness and tingling in her legs is progressing. She also finds that she has been tripping over her right foot and feels like she cannot clear the ground with the toes when she walks at times. She often has numbness and tingling and pain at night which wakes her up disrupts her sleep.  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  They have been allowing her to sit up for some of her work duties but this is still difficult.       She currently takes ibuprofen and gabapentin for pain and occasionally will take a Percocet at night to sleep.    She has trialed multiple courses of physical therapy (at [facility] in [place]) which have not been significantly helpful and she continues to do the exercises daily.  She has had multiple rounds of injections and while these have been somewhat helpful in the past the latest round was not helpful at all.    At her last visit she and I discussed a possible lumbar fusion type procedure and she returns today for final imaging review and surgical decision making.            Past Medical History:     Past Medical History:   Diagnosis Date     Allergic rhinitis      PONV (postoperative nausea and vomiting)             Past Surgical History:     Past Surgical  "History:   Procedure Laterality Date     TONSILLECTOMY & ADENOIDECTOMY       TUBAL LIGATION       TYMPANOSTOMY, LOCAL/TOPICAL ANESTHESIA              Social History:     Social History     Tobacco Use     Smoking status: Never Smoker     Smokeless tobacco: Never Used   Substance Use Topics     Alcohol use: Yes     Frequency: Monthly or less     Comment: rare            Family History:     Family History   Problem Relation Age of Onset     Anxiety Disorder Mother      Hyperlipidemia Mother      Depression Mother      Unknown/Adopted Father      Cancer Paternal Grandmother         unknown type      No Known Problems Half-Brother      Cancer Paternal Grandfather         unknown type     Unknown/Adopted Maternal Grandmother      Unknown/Adopted Maternal Grandfather             Allergies:   Not on File         Medications:     Current Outpatient Medications   Medication     calcium carbonate (OS-SANDRA) 500 MG tablet     fluticasone (FLONASE) 50 MCG/ACT nasal spray     gabapentin (NEURONTIN) 300 MG capsule     ibuprofen (ADVIL/MOTRIN) 800 MG tablet     influenza quadrivalent, PF, vacc (FLUZONE QUADRIVALENT) 0.5 ML injection     Omega-3 Fatty Acids (FISH OIL PO)     oxyCODONE-acetaminophen (PERCOCET) 5-325 MG tablet     propranolol ER (INDERAL LA) 120 MG 24 hr capsule     tiZANidine (ZANAFLEX) 2 MG tablet     traZODone (DESYREL) 50 MG tablet     venlafaxine (EFFEXOR-XR) 150 MG 24 hr capsule     venlafaxine (EFFEXOR-XR) 75 MG 24 hr capsule     vitamin D3 (CHOLECALCIFEROL) 2000 units tablet     No current facility-administered medications for this visit.              Review of Systems:   A focused musculoskeletal and neurologic ROS was performed with pertinent positives and negatives noted in the HPI.  Additional systems were also reviewed and are documented at the bottom of the note.         Physical Exam:   Vitals: Ht 1.708 m (5' 7.25\")   Wt 85.3 kg (188 lb)   LMP 12/20/2018 (Exact Date)   BMI 29.23 kg/m   "   Musculoskeletal, Neurologic, and Spine:   Lumbar Spine:                              + straight leg raise bilaterally                               Appearance - No gross stepoffs or deformities                              Motor -                               L2-3: Hip flexion 5/5 L and 5/5 R strength                               L3/4:  Knee extension L 5/5 and R 5/5 strength                              L4/5:  Foot dorsiflexion R 5/5 L 5/5 -al;though this fatigues somewhat to 4+/5 with repeated testing                                              EHL dorsiflexion R 4/5 L 5/5 strength                              S1:  Plantarflexion/Peroneal Muscles  L 5/5 and R 5/5 strength                              Sensation:                                             Left side- decreased sensation L4/L5/S1 on the left side. Intact L3.                                             Right side- decreased sensation L5, intact L3, L4, S1                                  Neurologic:                                REFLEXES Left Right   Patella 1+ 1+   Ankle jerk 1+ 1+   Clonus 0 beats 0 beats      Hip Exam:  No pain with hip log roll and no tenderness over the greater trochanters.     Alignment:  Patient stands with a neutral standing sagittal balance.              Imaging:   We ordered and independently reviewed new radiographs at this clinic visit. The results were discussed with the patient. Findings include:    AP and lateral lumbar flexion-extension radiographs December 20, 2018 show transitional anatomy at the L5-S1 level.  I am calling the first mobile disc L4-5.  This level has a grade 1 spondylolisthesis with no significant change between flexion and extension.  Severe loss of disc space height at L4-5.  Hyperlordosis at the L3-4 segment to compensate for the loss of lordosis at L4-5.  Preserved overall alignment.       November 8, 2018 lumbar MRI: Again noted is the transitional anatomy consistent with the standing  lumbar films, I am calling the spondylolisthesis level L4-5 and this level shows severe lateral recess stenosis in the setting of a grade 1 spondylolisthesis with mild to moderate adjacent segment changes at L3-4 but no severe neurologic compression.    Lumbar CT scan December 28, 2018 show severe spondylosis L4-5 and L5-S1 with complete disc space height loss and vacuum disc phenomena at each level.  Trace anterolisthesis L4 on L5.     Assessment and Plan:     48 year old female with transitional anatomy and a grade 1 spondylolisthesis at L4-5 with lateral recess stenosis and L5-S1 spondylosis.       Plan:  1. We discussed options moving forward.  At this point I think she has failed over 15 years of conservative management with a number of trials of physical therapy, multiple rounds of epidural injections and oral medications.  If she wished to pursue surgery I would recommend an L4-S1 posterior interbody fusion with Bowden-Marks osteotomy for regional deformity correction and the use of Stealth guidance for instrumentation placement.  We would augment the fusion with iliac crest autograft to improve the healing rate.     After her last visit we discussed possibly just doing L4-5 level, but based on the level of spondylosis at L5-S1 I think that should also be included.  Furthermore, she does have a regional deformity with hypolordosis across these levels and some compensatory hyperlordosis at the L3-4 level.  I think we can get better regional deformity correction if the L5-S1 level was included in this might help unload the adjacent segment facets at L3-4.  2. Risks of this surgery include risk of infection, risk of dural tear resulting in CSF leak which might result in headaches, or possible need for lumbar drain, or possible revision surgery in the setting of a persistent leak. Risk of hematoma or seroma resulting in wound complications.  Possible nerve root injury resulting in numbness weakness or paralysis  into the arms or legs. Possible radiculitis which could result in similar symptoms or could result in significant neurogenic type pain. There is also a risk of delayed onset nerve root pals or radiculitis, which I explained is potentially due to stretch injury or possibly due to delayed onset swelling, and is sometimes temporary but can also be permanent.  Risk of incomplete decompression which might require revision surgery in the future.  Risk of adjacent segment problems requiring surgery in the future. Risk of incomplete relief of symptoms possibly requiring revision surgery in the future. Furthermore, although rare, there are risks of major vessel injury such as to the major vessels anteriorly or to the bowel from the surgery.  Sometimes this can happen if an instrument is passed anteriorly through the disc space. There is a risk of nonunion which might require revision surgery in the future, and risk of hardware complications from the instrumentation.  I do use imaging to help guide the placement of the instrumentation, but even with this there is a chance of implant malposition or problem.  There is a risk of blood clots in the legs or the lungs.  Lastly, although rare, there are certainly risks of the anesthetic including stroke heart attack and death.    3. I explained that we used Stealth navigation with an O-Arm to place the instrumentation.  This is a form of CT-guided navigation for the screws.  We take an intraoperative CT scan which provides an accurate view of the bony anatomy and we then place the screws using the imaging guidance and then take a second CT scan to help verify the screw position. So the benefit of this technology is a high accuracy for screw placement.  4. We talked about the postoperative recovery period, the need for activity avoidance and the fact that it takes 6-12 months to get bony healing before she can return to full activities.  She does understand this and she would like to  proceed.  We will try to get things arranged for her.  5. She does have some insurance issues and she is going to work on getting these things worked out through our financial office and we will try to get things scheduled for her when appropriate.        Respectfully,  Santy Goyal MD  Spine Surgery  [facility]    Answers for HPI/ROS submitted by the patient on 1/4/2019   General Symptoms: Yes  Skin Symptoms: No  HENT Symptoms: Yes  EYE SYMPTOMS: Yes  HEART SYMPTOMS: No  LUNG SYMPTOMS: No  INTESTINAL SYMPTOMS: Yes  URINARY SYMPTOMS: Yes  GYNECOLOGIC SYMPTOMS: Yes  BREAST SYMPTOMS: No  SKELETAL SYMPTOMS: Yes  BLOOD SYMPTOMS: Yes  NERVOUS SYSTEM SYMPTOMS: Yes  MENTAL HEALTH SYMPTOMS: Yes  Fever: No  Loss of appetite: Yes  Weight loss: No  Weight gain: Yes  Fatigue: Yes  Night sweats: Yes  Chills: Yes  Increased stress: Yes  Excessive hunger: No  Excessive thirst: Yes  Feeling hot or cold when others believe the temperature is normal: Yes  Loss of height: Yes  Post-operative complications: No  Surgical site pain: No  Hallucinations: No  Change in or Loss of Energy: Yes  Hyperactivity: No  Confusion: Yes  Ear pain: No  Ear discharge: Yes  Hearing loss: Yes  Tinnitus: Yes  Nosebleeds: No  Congestion: Yes  Sinus pain: No  Trouble swallowing: No   Voice hoarseness: No  Mouth sores: No  Sore throat: No  Tooth pain: No  Gum tenderness: Yes  Bleeding gums: Yes  Change in taste: Yes  Change in sense of smell: No  Dry mouth: Yes  Hearing aid used: No  Neck lump: No  Eye pain: No  Vision loss: Yes  Dry eyes: Yes  Watery eyes: No  Eye bulging: No  Double vision: No  Flashing of lights: Yes  Spots: Yes  Floaters: Yes  Redness: No  Crossed eyes: No  Tunnel Vision: No  Yellowing of eyes: No  Eye irritation: Yes  Heart burn or indigestion: No  Nausea: Yes  Vomiting: No  Abdominal pain: No  Bloating: Yes  Constipation: Yes  Diarrhea: Yes  Blood in stool: No  Black stools: No  Rectal or Anal pain: No  Fecal  incontinence: No  Yellowing of skin or eyes: No  Vomit with blood: No  Change in stools: Yes  Trouble holding urine or incontinence: No  Pain or burning: No  Trouble starting or stopping: Yes  Increased frequency of urination: Yes  Blood in urine: No  Decreased frequency of urination: No  Frequent nighttime urination: Yes  Flank pain: No  Difficulty emptying bladder: Yes  Back pain: Yes  Muscle aches: Yes  Neck pain: Yes  Swollen joints: Yes  Joint pain: Yes  Bone pain: No  Muscle cramps: Yes  Muscle weakness: Yes  Joint stiffness: Yes  Bone fracture: No  Anemia: No  Swollen glands: No  Easy bleeding or bruising: Yes  Edema or swelling: No  Trouble with coordination: Yes  Dizziness or trouble with balance: Yes  Fainting or black-out spells: Yes  Memory loss: Yes  Headache: Yes  Seizures: No  Speech problems: Yes  Tingling: Yes  Tremor: No  Weakness: Yes  Difficulty walking: Yes  Paralysis: No  Numbness: Yes  Bleeding or spotting between periods: No  Heavy or painful periods: Yes  Irregular periods: Yes  Vaginal discharge: Yes  Hot flashes: Yes  Vaginal dryness: Yes  Genital ulcers: No  Reduced libido: No  Painful intercourse: Yes  Difficulty with sexual arousal: Yes  Post-menopausal bleeding: No  Nervous or Anxious: Yes  Depression: Yes  Trouble sleeping: Yes  Trouble thinking or concentrating: Yes  Mood changes: Yes  Panic attacks: Yes

## 2019-01-04 NOTE — ANESTHESIA PREPROCEDURE EVALUATION
Anesthesia Pre-Procedure Evaluation    Patient: Negrita Robledo   MRN:     [id number] Gender:   female   Age:    48 year old :      1970        Preoperative Diagnosis: Lumbar Stenosis and Neurogenic Claudication   Procedure(s):  Lumbar 4-5 Transforaminal Lumbar Interbody Fusion  Iliac Crest Autograft     No past medical history on file.   Past Surgical History:   Procedure Laterality Date     TONSILLECTOMY & ADENOIDECTOMY       TUBAL LIGATION       TYMPANOSTOMY, LOCAL/TOPICAL ANESTHESIA            Anesthesia Evaluation     . Pt has had prior anesthetic.     History of anesthetic complications   - PONV  upper torso muscle tension      ROS/MED HX    ENT/Pulmonary:     (+)allergic rhinitis, , . .    Neurologic:       Cardiovascular:     (+) Dyslipidemia, ----. : . . . :. . No previous cardiac testing       METS/Exercise Tolerance:  3 - Able to walk 1-2 blocks without stopping   Hematologic:  - neg hematologic  ROS       Musculoskeletal:   (+) arthritis (knees), , , other musculoskeletal- chronic low back pain, lumbar stenosis, neurogenic claudication. neck pain      GI/Hepatic:  - neg GI/hepatic ROS       Renal/Genitourinary:  - ROS Renal section negative       Endo:  - neg endo ROS       Psychiatric:     (+) psychiatric history anxiety      Infectious Disease:  - neg infectious disease ROS       Malignancy:      - no malignancy   Other:    (+) No chance of pregnancy H/O Chronic Pain,H/O chronic opiod use ,                        PHYSICAL EXAM:   Mental Status/Neuro: A/A/O   Airway: Facies: Feasible  Mallampati: II  Mouth/Opening: Full  TM distance: > 6 cm  Neck ROM: Full   Respiratory: Auscultation: CTAB     Resp. Rate: Normal     Resp. Effort: Normal      CV: Rhythm: Regular  Rate: Age appropriate  Heart: Normal Sounds   Comments:      Dental: Normal                  No results found for: WBC, HGB, HCT, PLT, CRP, SED, NA, POTASSIUM, CHLORIDE, CO2, BUN, CR, GLC, SANDRA, PHOS, MAG, ALBUMIN, PROTTOTAL, ALT, AST,  "GGT, ALKPHOS, BILITOTAL, BILIDIRECT, LIPASE, AMYLASE, ANNIE, PTT, INR, FIBR, TSH, T4, T3, HCG, HCGS, CKTOTAL, CKMB, TROPN    Preop Vitals  BP Readings from Last 3 Encounters:   No data found for BP    Pulse Readings from Last 3 Encounters:   No data found for Pulse      Resp Readings from Last 3 Encounters:   No data found for Resp    SpO2 Readings from Last 3 Encounters:   No data found for SpO2      Temp Readings from Last 1 Encounters:   No data found for Temp    Ht Readings from Last 1 Encounters:   01/04/19 1.708 m (5' 7.25\")      Wt Readings from Last 1 Encounters:   01/04/19 85.3 kg (188 lb)    Estimated body mass index is 29.23 kg/m  as calculated from the following:    Height as of an earlier encounter on 1/4/19: 1.708 m (5' 7.25\").    Weight as of an earlier encounter on 1/4/19: 85.3 kg (188 lb).     LDA:            AMY FV AN PLAN NO PONV RULE         PAC Discussion and Assessment    ASA Classification: 3  Case is suitable for: [place]  Anesthetic techniques and relevant risks discussed: GA  Invasive monitoring and risk discussed:   Types:   Possibility and Risk of blood transfusion discussed:   NPO instructions given:   Additional anesthetic preparation and risks discussed:   Needs early admission to pre-op area:   Other:     PAC Resident/NP Anesthesia Assessment:  Negrita Robledo is a 48 year old female scheduled for a Lumbar 4-5 Transforaminal Lumbar Interbody Fusion (N/A Spine) Iliac Crest Autograft (Hip) on 1/14/2019 by Dr. Goyal in treatment of lumbar stenosis and neurogenic claudication with chronic low back pain.  PAC referral for risk assessment and optimization for anesthesia with comorbid conditions of: hyperlipidemia, allergic rhinitis and anxiety.    Pre-operative considerations:  1.  Cardiac:  Functional status- METS 3.  She hasn't been able to be very active due to her chronic low back pain, but reports she can walk a block without stopping to rest.  BNP today is WNL for age.  Intermediate " risk surgery with 0.4% risk of major adverse cardiac event.   2.  Pulm:  Airway feasible.  ROBERTO risk: low.  Never smoker.   3.  GI:  Risk of PONV score = 4.  If > 2, anti-emetic intervention recommended.  She has history of PONV.  PONV prevention measures as per anesthesia DOS.  4. Musculoskeletal:  Her gait is mildly impaired.  Consider fall risk precautions.  She takes 1-3 tablets of percocet per day for chronic back pain.  Morphine equivalent dosing = 7.5-22.5mg.     VTE risk: 0.26%    Patient is optimized and is acceptable candidate for the proposed procedure.  No further diagnostic evaluation is needed.     Patient discussed with Dr. Conte.     **For further details of assessment, testing, and physical exam please see H and P completed on same date.          Della Baig DNP, RN, APRN      Reviewed and Signed by PAC Mid-Level Provider/Resident  Mid-Level Provider/Resident: Della Baig DNP, RN, APRN  Date: 1/4/2019  Time: 1337    Attending Anesthesiologist Anesthesia Assessment:        Anesthesiologist:   Date:   Time:   Pass/Fail:   Disposition:     PAC Pharmacist Assessment:        Pharmacist:   Date:   Time:        DEVIN Harrell CNP

## 2019-01-23 ENCOUNTER — TELEPHONE (OUTPATIENT)
Dept: ORTHOPEDICS | Facility: CLINIC | Age: 49
End: 2019-01-23

## 2019-01-23 NOTE — TELEPHONE ENCOUNTER
Reached out to patient seeing if her insurance has been updated and if she wanted to rescheduled her surgery with Dr. Goyal which was canceled in January. Left  for pt to call back [phone]

## 2019-01-23 NOTE — TELEPHONE ENCOUNTER
Gila returned phone call. Pt is still waiting on her new insurance to come through which should be on 2/1/19. Patient states that she will call us to update her insurance information and to reschedule surgery with Dr. Goyal

## 2019-02-01 ENCOUNTER — TELEPHONE (OUTPATIENT)
Dept: ORTHOPEDICS | Facility: CLINIC | Age: 49
End: 2019-02-01

## 2019-02-01 NOTE — TELEPHONE ENCOUNTER
Left VM for pt to call back [phone] to reschedule her surgery with Dr. Goyal which was canceled in January due to no insurance. Patient will also need a new PAC visit and clinic visit with Dr. Goyal

## 2019-02-04 ENCOUNTER — TELEPHONE (OUTPATIENT)
Dept: ORTHOPEDICS | Facility: CLINIC | Age: 49
End: 2019-02-04

## 2019-02-04 NOTE — TELEPHONE ENCOUNTER
Received voicemail message from patient returning Arnie's call to schedule surgery with Dr. Goyal.

## 2019-02-05 NOTE — TELEPHONE ENCOUNTER
Returning patients VM she left on 2/4/19. Left another  for patient to call back [phone] to reschedule surgery with Dr. Goyal.

## 2019-02-06 ENCOUNTER — DOCUMENTATION ONLY (OUTPATIENT)
Dept: ORTHOPEDICS | Facility: CLINIC | Age: 49
End: 2019-02-06

## 2019-02-06 NOTE — PROGRESS NOTES
Patient is scheduled for surgery with Dr. Goyal       Spoke or left message with: patient via phone call     Date of Surgery: 3/6/19     Location: [place]     Post-ops Made: 6 wks with provider     Pre-op with surgeon (if applicable): yes 2/15/19     H&P: Scheduled with PAC 2/15/19     Additional imaging/appointments: n/a     Surgery packet: given in clinic     Additional comments: n/a

## 2019-02-15 ENCOUNTER — OFFICE VISIT (OUTPATIENT)
Dept: SURGERY | Facility: CLINIC | Age: 49
End: 2019-02-15
Payer: COMMERCIAL

## 2019-02-15 ENCOUNTER — HEALTH MAINTENANCE LETTER (OUTPATIENT)
Age: 49
End: 2019-02-15

## 2019-02-15 ENCOUNTER — OFFICE VISIT (OUTPATIENT)
Dept: ORTHOPEDICS | Facility: CLINIC | Age: 49
End: 2019-02-15
Payer: COMMERCIAL

## 2019-02-15 ENCOUNTER — ANESTHESIA EVENT (OUTPATIENT)
Dept: SURGERY | Facility: CLINIC | Age: 49
End: 2019-02-15

## 2019-02-15 VITALS
WEIGHT: 190.5 LBS | BODY MASS INDEX: 29.9 KG/M2 | OXYGEN SATURATION: 97 % | HEART RATE: 60 BPM | HEIGHT: 67 IN | DIASTOLIC BLOOD PRESSURE: 61 MMHG | TEMPERATURE: 98.1 F | SYSTOLIC BLOOD PRESSURE: 114 MMHG

## 2019-02-15 DIAGNOSIS — M43.10 DEGENERATIVE SPONDYLOLISTHESIS: ICD-10-CM

## 2019-02-15 DIAGNOSIS — Z01.818 PRE-OP EVALUATION: ICD-10-CM

## 2019-02-15 DIAGNOSIS — M48.062 SPINAL STENOSIS OF LUMBAR REGION WITH NEUROGENIC CLAUDICATION: Primary | ICD-10-CM

## 2019-02-15 DIAGNOSIS — Z01.818 PREOP EXAMINATION: Primary | ICD-10-CM

## 2019-02-15 DIAGNOSIS — Z01.818 PRE-OP EVALUATION: Primary | ICD-10-CM

## 2019-02-15 ASSESSMENT — LIFESTYLE VARIABLES: TOBACCO_USE: 0

## 2019-02-15 ASSESSMENT — MIFFLIN-ST. JEOR: SCORE: 1521.73

## 2019-02-15 NOTE — PROGRESS NOTES
Spine Surgery Return Clinic Visit      Chief Complaint:   RECHECK (PACs review and discuss surgery )      Interval HPI:  Symptom Profile Including: location of symptoms, onset, severity, exacerbating/alleviating factors, previous treatments:        Negrita Robledo is a 48 year old female who presents with low back pain and bilateral L5 radicular symptoms which claudicate with ambulation.       15 years ago she was lifting a heavy bag of salt when she injured her back.  She recovered from this over the course of a few months but every 5 years she seems to aggravate the symptoms.  Over time she feels that the back pain as well as the weakness and numbness and tingling in her legs is progressing. She also finds that she has been tripping over her right foot and feels like she cannot clear the ground with the toes when she walks at times. She often has numbness and tingling and pain at night which wakes her up disrupts her sleep.  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  They have been allowing her to sit up for some of her work duties but this is still difficult.       She currently takes ibuprofen and gabapentin for pain and occasionally will take a Percocet at night to sleep.    She has trialed multiple courses of physical therapy (at [facility] in [place]) which have not been significantly helpful and she continues to do the exercises daily.  She has had multiple rounds of injections and while these have been somewhat helpful in the past the latest round was not helpful at all.     At her last visit she and I discussed a possible lumbar fusion type procedure and she returns today for final imaging review and surgical decision making.    She and I last met in January and there were some insurance delays, but she has since gotten the insurance re-established and now wishes to proceed with surgery.            Past Medical History:     Past  Medical History:   Diagnosis Date     Allergic rhinitis      Anxiety      PONV (postoperative nausea and vomiting)             Past Surgical History:     Past Surgical History:   Procedure Laterality Date     TONSILLECTOMY & ADENOIDECTOMY       TUBAL LIGATION       TYMPANOSTOMY, LOCAL/TOPICAL ANESTHESIA              Social History:     Social History     Tobacco Use     Smoking status: Never Smoker     Smokeless tobacco: Never Used   Substance Use Topics     Alcohol use: Yes     Frequency: Monthly or less     Comment: rare            Family History:     Family History   Problem Relation Age of Onset     Anxiety Disorder Mother      Hyperlipidemia Mother      Depression Mother      Unknown/Adopted Father      Cancer Paternal Grandmother         unknown type      No Known Problems Half-Brother      Cancer Paternal Grandfather         unknown type     Unknown/Adopted Maternal Grandmother      Unknown/Adopted Maternal Grandfather             Allergies:   Not on File         Medications:     Current Outpatient Medications   Medication     calcium carbonate (OS-SANDRA) 500 MG tablet     cholecalciferol (VITAMIN D3) 5000 units (125 mcg) capsule     Docosahexaenoic Acid (DHA OMEGA 3 PO)     fluticasone (FLONASE) 50 MCG/ACT nasal spray     gabapentin (NEURONTIN) 300 MG capsule     ibuprofen (ADVIL/MOTRIN) 800 MG tablet     Misc Natural Products (GLUCOSAMINE CHONDROITIN TRIPLE PO)     propranolol ER (INDERAL LA) 120 MG 24 hr capsule     tiZANidine (ZANAFLEX) 2 MG tablet     traZODone (DESYREL) 50 MG tablet     venlafaxine (EFFEXOR-XR) 150 MG 24 hr capsule     venlafaxine (EFFEXOR-XR) 75 MG 24 hr capsule     No current facility-administered medications for this visit.              Review of Systems:   A focused musculoskeletal and neurologic ROS was performed with pertinent positives and negatives noted in the HPI.  Additional systems were also reviewed and are documented at the bottom of the note.         Physical Exam:    Vitals: There were no vitals taken for this visit.  Musculoskeletal, Neurologic, and Spine:   Lumbar Spine:                              + straight leg raise bilaterally                               Appearance - No gross stepoffs or deformities                              Motor -                               L2-3: Hip flexion 5/5 L and 5/5 R strength                               L3/4:  Knee extension L 5/5 and R 5/5 strength                              L4/5:  Foot dorsiflexion R 5/5 L 5/5 -al;though this fatigues somewhat to 4+/5 with repeated testing                                              EHL dorsiflexion R 4/5 L 5/5 strength                              S1:  Plantarflexion/Peroneal Muscles  L 5/5 and R 5/5 strength                              Sensation:                                             Left side- decreased sensation L4/L5/S1 on the left side. Intact L3.                                             Right side- decreased sensation L5, intact L3, L4, S1                                  Neurologic:     REFLEXES Left Right   Patella 1+ 1+   Ankle jerk 1+ 1+   Clonus 0 beats 0 beats      Hip Exam:  No pain with hip log roll and no tenderness over the greater trochanters.     Alignment:  Patient stands with a neutral standing sagittal balance.              Imaging:   We ordered and independently reviewed new radiographs at this clinic visit. The results were discussed with the patient. Findings include:     AP and lateral lumbar flexion-extension radiographs December 20, 2018 show transitional anatomy at the L5-S1 level.  I am calling the first mobile disc L4-5.  This level has a grade 1 spondylolisthesis with no significant change between flexion and extension.  Severe loss of disc space height at L4-5.  Hyperlordosis at the L3-4 segment to compensate for the loss of lordosis at L4-5.  Preserved overall alignment.       November 8, 2018 lumbar MRI: Again noted is the transitional anatomy  consistent with the standing lumbar films, I am calling the spondylolisthesis level L4-5 and this level shows severe lateral recess stenosis in the setting of a grade 1 spondylolisthesis with mild to moderate adjacent segment changes at L3-4 but no severe neurologic compression.     Lumbar CT scan December 28, 2018 show severe spondylosis L4-5 and L5-S1 with complete disc space height loss and vacuum disc phenomena at each level.  Trace anterolisthesis L4 on L5.       Assessment and Plan:     48 year old female with transitional anatomy and a grade 1 spondylolisthesis at L4-5 with lateral recess stenosis and L5-S1 spondylosis.       Plan:  1. We discussed options moving forward.  At this point I think she has failed over 15 years of conservative management with a number of trials of physical therapy, multiple rounds of epidural injections and oral medications.  If she wished to pursue surgery I would recommend an L4-S1 posterior interbody fusion with Bowden-Marks osteotomy for regional deformity correction and the use of Stealth guidance for instrumentation placement.  We would augment the fusion with iliac crest autograft to improve the healing rate.     After her last visit we discussed possibly just doing L4-5 level, but based on the level of spondylosis at L5-S1 I think that should also be included.  Furthermore, she does have a regional deformity with hypolordosis across these levels and some compensatory hyperlordosis at the L3-4 level.  I think we can get better regional deformity correction if the L5-S1 level was included in this might help unload the adjacent segment facets at L3-4.  2. Risks of this surgery include risk of infection, risk of dural tear resulting in CSF leak which might result in headaches, or possible need for lumbar drain, or possible revision surgery in the setting of a persistent leak. Risk of hematoma or seroma resulting in wound complications.  Possible nerve root injury resulting  in numbness weakness or paralysis into the arms or legs. Possible radiculitis which could result in similar symptoms or could result in significant neurogenic type pain. There is also a risk of delayed onset nerve root pals or radiculitis, which I explained is potentially due to stretch injury or possibly due to delayed onset swelling, and is sometimes temporary but can also be permanent.  Risk of incomplete decompression which might require revision surgery in the future.  Risk of adjacent segment problems requiring surgery in the future. Risk of incomplete relief of symptoms possibly requiring revision surgery in the future. Furthermore, although rare, there are risks of major vessel injury such as to the major vessels anteriorly or to the bowel from the surgery.  Sometimes this can happen if an instrument is passed anteriorly through the disc space. There is a risk of nonunion which might require revision surgery in the future, and risk of hardware complications from the instrumentation.  I do use imaging to help guide the placement of the instrumentation, but even with this there is a chance of implant malposition or problem.  There is a risk of blood clots in the legs or the lungs.  Lastly, although rare, there are certainly risks of the anesthetic including stroke heart attack and death.    3. I explained that we used Stealth navigation with an O-Arm to place the instrumentation.  This is a form of CT-guided navigation for the screws.  We take an intraoperative CT scan which provides an accurate view of the bony anatomy and we then place the screws using the imaging guidance and then take a second CT scan to help verify the screw position. So the benefit of this technology is a high accuracy for screw placement.  4. We talked about the postoperative recovery period, the need for activity avoidance and the fact that it takes 6-12 months to get bony healing before she can return to full activities.  She does  understand this and she would like to proceed.  We will try to get things arranged for her.  5. All questions were answered today and we again reviewed the above plans and she wishes to proceed.           Respectfully,  Santy Goyal MD  Spine Surgery  [facility]

## 2019-02-15 NOTE — PROGRESS NOTES
Preoperative Assessment Center medication history for February 15, 2019 is complete.    See Epic admission navigator for prior to admission medications.   Operating room staff will still need to confirm medications and last dose information on day of surgery.     Medication history interview sources:  patient, care everywhere, payer information.     Changes made to PTA medication list (reason)  Added: glucosamine-chondroitin, omega 3/DHA, vitamin D  Deleted: fish oil, percocet (no longer taking), flu shot (done)  Changed: flonase sig, effexor sig, tizanidine dose/sig,     Additional medication history information (including reliability of information, actions taken by pharmacist):    -- No recent (within 30 days) course of antibiotics  -- No recent (within 30 days) course of steroids  -- No recent (within 30 days) chronic daily medications stopped   -- Patient declines being on any other prescription or over-the-counter medications    Prior to Admission medications    Medication Sig Last Dose Taking? Auth Provider   calcium carbonate (OS-SANDRA) 500 MG tablet Take 1 tablet (500 mg) by mouth daily  Patient taking differently: Take 1 tablet by mouth every morning  Taking Yes Santy Goyal MD   cholecalciferol (VITAMIN D3) 5000 units (125 mcg) capsule Take 5,000 Units by mouth daily Taking Yes Unknown, Entered By History   Docosahexaenoic Acid (DHA OMEGA 3 PO) Take 1 capsule daily in the morning     Contains 683 mg EPA and 252 mg DHA Taking Yes Unknown, Entered By History   fluticasone (FLONASE) 50 MCG/ACT nasal spray Spray 1 spray into both nostrils daily as needed  Taking Yes Reported, Patient   gabapentin (NEURONTIN) 300 MG capsule Take 900 mg by mouth At Bedtime  Taking Yes Della Baig APRN CNP   ibuprofen (ADVIL/MOTRIN) 800 MG tablet Take 800 mg by mouth every 8 hours as needed  Taking Yes Reported, Patient   Misc Natural Products (GLUCOSAMINE CHONDROITIN TRIPLE PO) Take 1 tablet by mouth  daily Taking Yes Unknown, Entered By History   propranolol ER (INDERAL LA) 120 MG 24 hr capsule Take 1 capsule (120 mg) by mouth daily For anxiety management Taking Yes Della Baig APRN CNP   tiZANidine (ZANAFLEX) 2 MG tablet Take 4 mg by mouth At Bedtime  Taking Yes Reported, Patient   traZODone (DESYREL) 50 MG tablet Take 50 mg by mouth At Bedtime  Taking Yes Reported, Patient   venlafaxine (EFFEXOR-XR) 150 MG 24 hr capsule TAKE ONE CAPSULE BY MOUTH ONCE DAILY IN  THE  MORNING  TO  IMPROVE  MOODS  (take with 75 mg capsule for total daily dose of 225mg) Taking Yes Reported, Patient   venlafaxine (EFFEXOR-XR) 75 MG 24 hr capsule TAKE ONE CAPSULE BY MOUTH ONCE DAILY WITH MEALS FOR  MOOD  -  (Take with 150 mg capsule for total daily dose of 225 mg) Taking Yes Reported, Patient          Medication history completed by: Fabio Santana [facility]

## 2019-02-15 NOTE — ANESTHESIA PREPROCEDURE EVALUATION
Anesthesia Pre-Procedure Evaluation    Patient: Negrita Robledo   MRN:     [id number] Gender:   female   Age:    48 year old :      1970        Preoperative Diagnosis: * No surgery found *        Past Medical History:   Diagnosis Date     Allergic rhinitis      Anxiety      PONV (postoperative nausea and vomiting)       Past Surgical History:   Procedure Laterality Date     TONSILLECTOMY & ADENOIDECTOMY       TUBAL LIGATION       TYMPANOSTOMY, LOCAL/TOPICAL ANESTHESIA            Anesthesia Evaluation     . Pt has had prior anesthetic.     History of anesthetic complications   - PONV  upper torso muscle tension      ROS/MED HX    ENT/Pulmonary:     (+)allergic rhinitis, , . .   (-) tobacco use   Neurologic:     (+)migraines,     Cardiovascular:     (+) Dyslipidemia, ----. : . . . :. . No previous cardiac testing       METS/Exercise Tolerance:  3 - Able to walk 1-2 blocks without stopping   Hematologic:  - neg hematologic  ROS       Musculoskeletal:   (+) arthritis (knees), , , other musculoskeletal- chronic low back pain, lumbar stenosis, neurogenic claudication. neck pain      GI/Hepatic:  - neg GI/hepatic ROS       Renal/Genitourinary:  - ROS Renal section negative       Endo:  - neg endo ROS       Psychiatric:     (+) psychiatric history anxiety      Infectious Disease:  - neg infectious disease ROS       Malignancy:      - no malignancy   Other:    (+) No chance of pregnancy H/O Chronic Pain,H/O chronic opiod use ,                        PHYSICAL EXAM:   Mental Status/Neuro: A/A/O   Airway: Facies: Feasible  Mallampati: I  Mouth/Opening: Full  TM distance: > 6 cm  Neck ROM: Full   Respiratory: Auscultation: CTAB     Resp. Rate: Normal     Resp. Effort: Normal      CV: Rhythm: Regular  Rate: Age appropriate  Heart: Normal Sounds   Comments:      Dental: Normal                  Lab Results   Component Value Date    WBC 9.4 2019    HGB 13.1 2019    HCT 40.4 2019     2019     " 01/04/2019    POTASSIUM 4.2 01/04/2019    CHLORIDE 106 01/04/2019    CO2 24 01/04/2019    BUN 14 01/04/2019    CR 0.67 01/04/2019    GLC 86 01/04/2019    SANDRA 8.8 01/04/2019       Preop Vitals  BP Readings from Last 3 Encounters:   02/15/19 114/61   01/04/19 110/63    Pulse Readings from Last 3 Encounters:   02/15/19 60   01/04/19 53      Resp Readings from Last 3 Encounters:   No data found for Resp    SpO2 Readings from Last 3 Encounters:   02/15/19 97%   01/04/19 97%      Temp Readings from Last 1 Encounters:   02/15/19 98.1  F (36.7  C) (Oral)    Ht Readings from Last 1 Encounters:   02/15/19 1.702 m (5' 7\")      Wt Readings from Last 1 Encounters:   02/15/19 86.4 kg (190 lb 8 oz)    Estimated body mass index is 29.84 kg/m  as calculated from the following:    Height as of this encounter: 1.702 m (5' 7\").    Weight as of this encounter: 86.4 kg (190 lb 8 oz).     LDA:            JZG FV AN PLAN NO PONV RULE         PAC Discussion and Assessment    ASA Classification: 2  Case is suitable for: [place]  Anesthetic techniques and relevant risks discussed: GA  Invasive monitoring and risk discussed:   Types:   Possibility and Risk of blood transfusion discussed:   NPO instructions given:   Additional anesthetic preparation and risks discussed:   Needs early admission to pre-op area:   Other:     PAC Resident/NP Anesthesia Assessment:  Negrita Robledo is a 48 year old female scheduled for a Lumbar 4-5 Transforaminal Lumbar Interbody Fusion (N/A Spine) Iliac Crest Autograft (Hip) on 3/6/2019 by Dr. Goyal in treatment of lumbar stenosis and neurogenic claudication with chronic low back pain.  PAC referral for risk assessment and optimization for anesthesia with comorbid conditions of: hyperlipidemia, allergic rhinitis and anxiety.    Pre-operative considerations:  1.  Cardiac:  Functional status- METS 3.  She hasn't been able to be very active due to her chronic low back pain, but reports she can walk a block " without stopping to rest.  BNP previously drawn in prep for surgery is WNL for age.  Intermediate risk surgery with 0.4% risk of major adverse cardiac event. Denies chest pain or any other cardiac symptoms. Denies LUNA or PND.  2.  Pulm:  Airway feasible.  ROBERTO risk: low. Denies shortness of breath or any other pulm. Symptoms or history  Never smoker.   3.  GI:  Risk of PONV score = 4.  If > 2, anti-emetic intervention recommended.  She has history of PONV.  PONV prevention measures as per anesthesia DOS.  4. Musculoskeletal:  Her gait is mildly impaired.  Consider fall risk precautions.      VTE risk: 0.26%    Patient is optimized and is acceptable candidate for the proposed procedure.  No further diagnostic evaluation is needed.     Patient discussed with Dr. Conte.     **For further details of assessment, testing, and physical exam please see H and P completed on same date.          Della Baig DNP, RN, APRN      Reviewed and Signed by PAC Mid-Level Provider/Resident  Mid-Level Provider/Resident: Kelly BELTRAN CNP  Date: 3/6/2019  Time:     Attending Anesthesiologist Anesthesia Assessment:  48 year old for L4/5 fusion with autograft. Patient has no significant cardiac or pulmonary disease.    Patient/case discussed with PAIGE/resident; agree with above assessment. No need to see patient. Patient is appropriate for the planned procedure without further work-up or medical management.      Reviewed and Signed by PAC Anesthesiologist  Anesthesiologist: apolinar  Date: 2.15.2019  Time:   Pass/Fail: Pass  Disposition:     PAC Pharmacist Assessment:        Pharmacist:   Date:   Time:        DEVIN Teixeira CNP

## 2019-02-15 NOTE — NURSING NOTE
Reason For Visit:   Chief Complaint   Patient presents with     RECHECK     PACs review and discuss surgery        There were no vitals taken for this visit.         Yang Kapoor ATC

## 2019-02-15 NOTE — H&P
Pre-Operative H & P     CC:  Preoperative exam to assess for increased cardiopulmonary risk while undergoing surgery and anesthesia.    Date of Encounter: 2/15/2019  Primary Care Physician:  Reta Castaneda  Reason for visit: lumbar stenosis  HPI  Negrita Robledo is a 48 year old female who presents for pre-operative H & P in preparation for  Lumbar 4-5 Transforaminal Lumbar Interbody Fusion (N/A Spine) Iliac Crest Autograft (Hip) with Dr. Goyal on 3/6/2019 at [facility].     Negrita Robledo is a 48 year old female who has chronic low back pain, lumbar stenosis and neurogenic claudication. She first injured herself while heavy lifting in 2003. Originally her symptoms were intermittent but over time her pain and radicular symptoms worsened to almost constant and she finds it difficult to get comfortable in most positions. She also reports pain radiating down her legs bilaterally, her gait has become impaired and unsteady. She has attempted multiple injections, physical therapy, NSAIDS, gabapentin and percocet for treatment, however her symptoms persist. She has consulted with Dr. Goyal and the above procedure has been recommended and scheduled.     Her history also includes hyperlipidemia, allergic rhinitis and anxiety.    History is obtained from the patient.     Past Medical History  Past Medical History:   Diagnosis Date     Allergic rhinitis      Anxiety      PONV (postoperative nausea and vomiting)        Past Surgical History  Past Surgical History:   Procedure Laterality Date     TONSILLECTOMY & ADENOIDECTOMY       TUBAL LIGATION       TYMPANOSTOMY, LOCAL/TOPICAL ANESTHESIA         Hx of Blood transfusions/reactions: no     Hx of abnormal bleeding or anti-platelet use: no    Menstrual history: No LMP recorded.:     Steroid use in the last year: no    Personal or FH with difficulty with Anesthesia:  Yes + PONV    Prior to Admission Medications  Current Outpatient  Medications   Medication Sig Dispense Refill     calcium carbonate (OS-SANDRA) 500 MG tablet Take 1 tablet (500 mg) by mouth daily (Patient taking differently: Take 1 tablet by mouth every morning ) 90 tablet 1     cholecalciferol (VITAMIN D3) 5000 units (125 mcg) capsule Take 5,000 Units by mouth daily       Docosahexaenoic Acid (DHA OMEGA 3 PO) Take 1 capsule daily in the morning     Contains 683 mg EPA and 252 mg DHA       fluticasone (FLONASE) 50 MCG/ACT nasal spray Spray 1 spray into both nostrils daily as needed        gabapentin (NEURONTIN) 300 MG capsule Take 900 mg by mouth At Bedtime        ibuprofen (ADVIL/MOTRIN) 800 MG tablet Take 800 mg by mouth every 8 hours as needed        Misc Natural Products (GLUCOSAMINE CHONDROITIN TRIPLE PO) Take 1 tablet by mouth daily       propranolol ER (INDERAL LA) 120 MG 24 hr capsule Take 1 capsule (120 mg) by mouth daily For anxiety management       tiZANidine (ZANAFLEX) 2 MG tablet Take 4 mg by mouth At Bedtime        traZODone (DESYREL) 50 MG tablet Take 50 mg by mouth At Bedtime        venlafaxine (EFFEXOR-XR) 150 MG 24 hr capsule TAKE ONE CAPSULE BY MOUTH ONCE DAILY IN  THE  MORNING  TO  IMPROVE  MOODS  (take with 75 mg capsule for total daily dose of 225mg)       venlafaxine (EFFEXOR-XR) 75 MG 24 hr capsule TAKE ONE CAPSULE BY MOUTH ONCE DAILY WITH MEALS FOR  MOOD  -  (Take with 150 mg capsule for total daily dose of 225 mg)         Allergies  Not on File    Social History  Social History     Socioeconomic History     Marital status: Single     Spouse name: Not on file     Number of children: 0     Years of education: Not on file     Highest education level: Not on file   Social Needs     Financial resource strain: Not on file     Food insecurity - worry: Not on file     Food insecurity - inability: Not on file     Transportation needs - medical: Not on file     Transportation needs - non-medical: Not on file   Occupational History     Occupation: pharmacy tech    Tobacco Use     Smoking status: Never Smoker     Smokeless tobacco: Never Used   Substance and Sexual Activity     Alcohol use: Yes     Frequency: Monthly or less     Comment: rare     Drug use: No     Sexual activity: Not on file   Other Topics Concern     Not on file   Social History Narrative     Not on file       Family History  Family History   Problem Relation Age of Onset     Anxiety Disorder Mother      Hyperlipidemia Mother      Depression Mother      Unknown/Adopted Father      Cancer Paternal Grandmother         unknown type      No Known Problems Half-Brother      Cancer Paternal Grandfather         unknown type     Unknown/Adopted Maternal Grandmother      Unknown/Adopted Maternal Grandfather            ROS/MED HX  The complete review of systems is negative other than noted in the HPI or here.       ENT/Pulmonary:     (+)allergic rhinitis, , . .   (-) tobacco use   Neurologic:     (+)migraines,     Cardiovascular:     (+) Dyslipidemia, ----. : . . . :. . No previous cardiac testing       METS/Exercise Tolerance:  3 - Able to walk 1-2 blocks without stopping   Hematologic:  - neg hematologic  ROS       Musculoskeletal:   (+) arthritis (knees), , , other musculoskeletal- chronic low back pain, lumbar stenosis, neurogenic claudication. neck pain      GI/Hepatic:  - neg GI/hepatic ROS       Renal/Genitourinary:  - ROS Renal section negative       Endo:  - neg endo ROS       Psychiatric:     (+) psychiatric history anxiety      Infectious Disease:  - neg infectious disease ROS       Malignancy:      - no malignancy   Other:    (+) No chance of pregnancy H/O Chronic Pain,H/O chronic opiod use ,                Lab Results   Component Value Date    WBC 9.4 01/04/2019    HGB 13.1 01/04/2019    HCT 40.4 01/04/2019     01/04/2019     01/04/2019    POTASSIUM 4.2 01/04/2019    CHLORIDE 106 01/04/2019    CO2 24 01/04/2019    BUN 14 01/04/2019    CR 0.67 01/04/2019    GLC 86 01/04/2019    SANDRA 8.8  "01/04/2019       Preop Vitals  BP Readings from Last 3 Encounters:   02/15/19 114/61   01/04/19 110/63    Pulse Readings from Last 3 Encounters:   02/15/19 60   01/04/19 53      Resp Readings from Last 3 Encounters:   No data found for Resp    SpO2 Readings from Last 3 Encounters:   02/15/19 97%   01/04/19 97%      Temp Readings from Last 1 Encounters:   02/15/19 98.1  F (36.7  C) (Oral)    Ht Readings from Last 1 Encounters:   02/15/19 1.702 m (5' 7\")      Wt Readings from Last 1 Encounters:   02/15/19 86.4 kg (190 lb 8 oz)    Estimated body mass index is 29.84 kg/m  as calculated from the following:    Height as of this encounter: 1.702 m (5' 7\").    Weight as of this encounter: 86.4 kg (190 lb 8 oz).       Temp: 98.1  F (36.7  C) Temp src: Oral BP: 114/61 Pulse: 60     SpO2: 97 %         190 lbs 8 oz  5' 7\"   Body mass index is 29.84 kg/m .       Physical Exam  Constitutional: Awake, alert, cooperative, no apparent distress, and appears stated age.  Eyes: Pupils equal, round and reactive to light, extra ocular muscles intact, sclera clear, conjunctiva normal.  HENT: Normocephalic, oral pharynx with moist mucus membranes, good dentition. No goiter appreciated.   Respiratory: Clear to auscultation bilaterally, no crackles or wheezing.  Cardiovascular: Regular rate and rhythm, normal S1 and S2, and no murmur noted.  Carotids +2, no bruits. No edema. Palpable pulses to radial  DP and PT arteries.   GI: Normal bowel sounds, soft, non-distended, non-tender, no masses palpated, no hepatosplenomegaly.    Lymph/Hematologic: No cervical lymphadenopathy and no supraclavicular lymphadenopathy.  Genitourinary:  deferred  Skin: Warm and dry.  No rashes at anticipated surgical site.   Musculoskeletal: Full ROM of neck. There is no redness, warmth, or swelling of the joints. Gross motor strength is normal.    Neurologic: Awake, alert, oriented to name, place and time. Cranial nerves II-XII are grossly intact. Gait is normal. "   Neuropsychiatric: Calm, cooperative. Normal affect.     Labs: (personally reviewed)  Lab Results   Component Value Date    WBC 9.4 01/04/2019     Lab Results   Component Value Date    RBC 4.45 01/04/2019     Lab Results   Component Value Date    HGB 13.1 01/04/2019     Lab Results   Component Value Date    HCT 40.4 01/04/2019     No components found for: MCT  Lab Results   Component Value Date    MCV 91 01/04/2019     Lab Results   Component Value Date    MCH 29.4 01/04/2019     Lab Results   Component Value Date    MCHC 32.4 01/04/2019     Lab Results   Component Value Date    RDW 13.2 01/04/2019     Lab Results   Component Value Date     01/04/2019     Last Comprehensive Metabolic Panel:  Sodium   Date Value Ref Range Status   01/04/2019 137 133 - 144 mmol/L Final     Potassium   Date Value Ref Range Status   01/04/2019 4.2 3.4 - 5.3 mmol/L Final     Chloride   Date Value Ref Range Status   01/04/2019 106 94 - 109 mmol/L Final     Carbon Dioxide   Date Value Ref Range Status   01/04/2019 24 20 - 32 mmol/L Final     Anion Gap   Date Value Ref Range Status   01/04/2019 8 3 - 14 mmol/L Final     Glucose   Date Value Ref Range Status   01/04/2019 86 70 - 99 mg/dL Final     Urea Nitrogen   Date Value Ref Range Status   01/04/2019 14 7 - 30 mg/dL Final     Creatinine   Date Value Ref Range Status   01/04/2019 0.67 0.52 - 1.04 mg/dL Final     GFR Estimate   Date Value Ref Range Status   01/04/2019 >90 >60 mL/min/[1.73_m2] Final     Comment:     Non  GFR Calc  Starting 12/18/2018, serum creatinine based estimated GFR (eGFR) will be   calculated using the Chronic Kidney Disease Epidemiology Collaboration   (CKD-EPI) equation.       Calcium   Date Value Ref Range Status   01/04/2019 8.8 8.5 - 10.1 mg/dL Final           Outside records reviewed from: Care everywhere    ASSESSMENT and PLAN  Negrita Robledo is a 48 year old female scheduled for a Lumbar 4-5 Transforaminal Lumbar Interbody Fusion (N/A  Spine) Iliac Crest Autograft (Hip) on 3/6/2019 by Dr. Goyal in treatment of lumbar stenosis and neurogenic claudication with chronic low back pain.  PAC referral for risk assessment and optimization for anesthesia with comorbid conditions of: hyperlipidemia, allergic rhinitis and anxiety.    Pre-operative considerations:  1.  Cardiac:  Functional status- METS 3.  She hasn't been able to be very active due to her chronic low back pain, but reports she can walk a block without stopping to rest.  BNP previously drawn in prep for surgery is WNL for age.  Intermediate risk surgery with 0.4% risk of major adverse cardiac event. Denies chest pain or any other cardiac symptoms. Denies LUNA or PND.  2.  Pulm:  Airway feasible.  ROBERTO risk: low. Denies shortness of breath or any other pulm. Symptoms or history  Never smoker.   3.  GI:  Risk of PONV score = 4.  If > 2, anti-emetic intervention recommended.  She has history of PONV.  PONV prevention measures as per anesthesia DOS.  4. Musculoskeletal:  Her gait is mildly impaired.  Consider fall risk precautions.      VTE risk: 0.26%    Patient is optimized and is acceptable candidate for the proposed procedure.  No further diagnostic evaluation is needed.         Patient was discussed with Dr Odom.    DEVIN Teixeira CNP  Preoperative Assessment Center  [facility]  [facility]  Phone: [phone]  Fax: [phone]

## 2019-02-15 NOTE — PATIENT INSTRUCTIONS
Preparing for Your Surgery      Name:  Negrita Robledo   MRN:  [id number]   :  1970   Today's Date:  2/15/2019     Arriving for surgery:  Surgery date: 2019   Arrival time:  5:30 am  Please come to:     [facility] [address]  [address] [place]  [address]    - parking is available in front of [facility] from 5:15AM to 8:00PM. If you prefer, park your car in the Green Lot.    -Proceed to the 3rd floor, check in at the Adult Surgery Waiting Lounge. [phone]    If an escort is needed stop at the Information Desk in the lobby. Inform the information person that you are here for surgery. An escort to the Adult Surgery Waiting Lounge will be provided.        What can I eat or drink?  -  You may have solid food or milk products until 8 hours prior to your surgery.(3/5/19 at 11 pm)  -  You may have water, apple juice or 7up/Sprite until 2 hours prior to your surgery.(until 5 am)    Which medicines can I take?        Stop Aspirin, vitamins and supplements (Glucosamine fish oil) one week prior to surgery.      Hold Ibuprofen for 24 hours and/or Naproxen for 48 hours prior to surgery.     -  Please take these medications the day of surgery:     Venlafaxine      Propranolol       How do I prepare myself?  -  Take two showers: one the night before surgery; and one the morning of surgery.         Use Scrubcare or Hibiclens to wash from neck down.  You may use your own shampoo and conditioner. No other hair products.   -  Do NOT use lotion, powder, deodorant, or antiperspirant the day of your surgery.  -  Do NOT wear any makeup, fingernail polish or jewelry.  - Do not bring your own medications to the hospital, except for inhalers and eye drops.  -  Bring your ID and insurance card.    Questions or Concerns:  -If you have questions or concerns regarding the day of surgery, please call [phone].       -For questions after surgery please call your  surgeons office.     Influenza visitor restrictions are in force at this time.  The hospital is prohibiting the following visitors:  -Any sick persons regardless of age  -Anyone with known exposure to a communicable illness  -Anyone under the age of 5            AFTER YOUR SURGERY  Breathing exercises   Breathing exercises help you recover faster. Take deep breaths and let the air out slowly. This will:     Help you wake up after surgery.    Help prevent complications like pneumonia.  Preventing complications will help you go home sooner.   We may give you a breathing device (incentive spirometer) to encourage you to breathe deeply.   Nausea and vomiting   You may feel sick to your stomach after surgery; if so, let your nurse know.    Pain control:  After surgery, you may have pain. Our goal is to help you manage your pain. Pain medicine will help you feel comfortable enough to do activities that will help you heal.  These activities may include breathing exercises, walking and physical therapy.   To help your health care team treat your pain we will ask: 1) If you have pain  2) where it is located 3) describe your pain in your words  Methods of pain control include medications given by mouth, vein or by nerve block for some surgeries.  We may give you a pain control pump that will:  1) Deliver the medicine through a tube placed in your vein  2) Control the amount of medicine you receive  3) Allow you to push a button to deliver a dose of pain medicine  Sequential Compression Device (SCD) or Pneumo Boots:  You may need to wear SCD S on your legs or feet. These are wraps connected to a machine that pumps in air and releases it. The repeated pumping helps prevent blood clots from forming.

## 2019-02-15 NOTE — LETTER
2/15/2019       RE: Negrita Robledo  [address]  [facility] [address]     Dear Colleague,    Thank you for referring your patient, Negrita Robledo, to the [facility] at [facility]. Please see a copy of my visit note below.    Spine Surgery Return Clinic Visit      Chief Complaint:   RECHECK (PACs review and discuss surgery )      Interval HPI:  Symptom Profile Including: location of symptoms, onset, severity, exacerbating/alleviating factors, previous treatments:        Negrita Robledo is a 48 year old female who presents with low back pain and bilateral L5 radicular symptoms which claudicate with ambulation.       15 years ago she was lifting a heavy bag of salt when she injured her back.  She recovered from this over the course of a few months but every 5 years she seems to aggravate the symptoms.  Over time she feels that the back pain as well as the weakness and numbness and tingling in her legs is progressing. She also finds that she has been tripping over her right foot and feels like she cannot clear the ground with the toes when she walks at times. She often has numbness and tingling and pain at night which wakes her up disrupts her sleep.  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  They have been allowing her to sit up for some of her work duties but this is still difficult.       She currently takes ibuprofen and gabapentin for pain and occasionally will take a Percocet at night to sleep.    She has trialed multiple courses of physical therapy (at [facility] in [place]) which have not been significantly helpful and she continues to do the exercises daily.  She has had multiple rounds of injections and while these have been somewhat helpful in the past the latest round was not helpful at all.     At her last visit she and I discussed a possible lumbar fusion type  procedure and she returns today for final imaging review and surgical decision making.    She and I last met in January and there were some insurance delays, but she has since gotten the insurance re-established and now wishes to proceed with surgery.            Past Medical History:     Past Medical History:   Diagnosis Date     Allergic rhinitis      Anxiety      PONV (postoperative nausea and vomiting)             Past Surgical History:     Past Surgical History:   Procedure Laterality Date     TONSILLECTOMY & ADENOIDECTOMY       TUBAL LIGATION       TYMPANOSTOMY, LOCAL/TOPICAL ANESTHESIA              Social History:     Social History     Tobacco Use     Smoking status: Never Smoker     Smokeless tobacco: Never Used   Substance Use Topics     Alcohol use: Yes     Frequency: Monthly or less     Comment: rare            Family History:     Family History   Problem Relation Age of Onset     Anxiety Disorder Mother      Hyperlipidemia Mother      Depression Mother      Unknown/Adopted Father      Cancer Paternal Grandmother         unknown type      No Known Problems Half-Brother      Cancer Paternal Grandfather         unknown type     Unknown/Adopted Maternal Grandmother      Unknown/Adopted Maternal Grandfather             Allergies:   Not on File         Medications:     Current Outpatient Medications   Medication     calcium carbonate (OS-SANDRA) 500 MG tablet     cholecalciferol (VITAMIN D3) 5000 units (125 mcg) capsule     Docosahexaenoic Acid (DHA OMEGA 3 PO)     fluticasone (FLONASE) 50 MCG/ACT nasal spray     gabapentin (NEURONTIN) 300 MG capsule     ibuprofen (ADVIL/MOTRIN) 800 MG tablet     Misc Natural Products (GLUCOSAMINE CHONDROITIN TRIPLE PO)     propranolol ER (INDERAL LA) 120 MG 24 hr capsule     tiZANidine (ZANAFLEX) 2 MG tablet     traZODone (DESYREL) 50 MG tablet     venlafaxine (EFFEXOR-XR) 150 MG 24 hr capsule     venlafaxine (EFFEXOR-XR) 75 MG 24 hr capsule     No current facility-administered  medications for this visit.              Review of Systems:   A focused musculoskeletal and neurologic ROS was performed with pertinent positives and negatives noted in the HPI.  Additional systems were also reviewed and are documented at the bottom of the note.         Physical Exam:   Vitals: There were no vitals taken for this visit.  Musculoskeletal, Neurologic, and Spine:   Lumbar Spine:                              + straight leg raise bilaterally                               Appearance - No gross stepoffs or deformities                              Motor -                               L2-3: Hip flexion 5/5 L and 5/5 R strength                               L3/4:  Knee extension L 5/5 and R 5/5 strength                              L4/5:  Foot dorsiflexion R 5/5 L 5/5 -al;though this fatigues somewhat to 4+/5 with repeated testing                                              EHL dorsiflexion R 4/5 L 5/5 strength                              S1:  Plantarflexion/Peroneal Muscles  L 5/5 and R 5/5 strength                              Sensation:                                             Left side- decreased sensation L4/L5/S1 on the left side. Intact L3.                                             Right side- decreased sensation L5, intact L3, L4, S1                                  Neurologic:     REFLEXES Left Right   Patella 1+ 1+   Ankle jerk 1+ 1+   Clonus 0 beats 0 beats      Hip Exam:  No pain with hip log roll and no tenderness over the greater trochanters.     Alignment:  Patient stands with a neutral standing sagittal balance.              Imaging:   We ordered and independently reviewed new radiographs at this clinic visit. The results were discussed with the patient. Findings include:     AP and lateral lumbar flexion-extension radiographs December 20, 2018 show transitional anatomy at the L5-S1 level.  I am calling the first mobile disc L4-5.  This level has a grade 1 spondylolisthesis with no  significant change between flexion and extension.  Severe loss of disc space height at L4-5.  Hyperlordosis at the L3-4 segment to compensate for the loss of lordosis at L4-5.  Preserved overall alignment.       November 8, 2018 lumbar MRI: Again noted is the transitional anatomy consistent with the standing lumbar films, I am calling the spondylolisthesis level L4-5 and this level shows severe lateral recess stenosis in the setting of a grade 1 spondylolisthesis with mild to moderate adjacent segment changes at L3-4 but no severe neurologic compression.     Lumbar CT scan December 28, 2018 show severe spondylosis L4-5 and L5-S1 with complete disc space height loss and vacuum disc phenomena at each level.  Trace anterolisthesis L4 on L5.       Assessment and Plan:     48 year old female with transitional anatomy and a grade 1 spondylolisthesis at L4-5 with lateral recess stenosis and L5-S1 spondylosis.       Plan:  1. We discussed options moving forward.  At this point I think she has failed over 15 years of conservative management with a number of trials of physical therapy, multiple rounds of epidural injections and oral medications.  If she wished to pursue surgery I would recommend an L4-S1 posterior interbody fusion with Bowden-Marks osteotomy for regional deformity correction and the use of Stealth guidance for instrumentation placement.  We would augment the fusion with iliac crest autograft to improve the healing rate.     After her last visit we discussed possibly just doing L4-5 level, but based on the level of spondylosis at L5-S1 I think that should also be included.  Furthermore, she does have a regional deformity with hypolordosis across these levels and some compensatory hyperlordosis at the L3-4 level.  I think we can get better regional deformity correction if the L5-S1 level was included in this might help unload the adjacent segment facets at L3-4.  2. Risks of this surgery include risk of  infection, risk of dural tear resulting in CSF leak which might result in headaches, or possible need for lumbar drain, or possible revision surgery in the setting of a persistent leak. Risk of hematoma or seroma resulting in wound complications.  Possible nerve root injury resulting in numbness weakness or paralysis into the arms or legs. Possible radiculitis which could result in similar symptoms or could result in significant neurogenic type pain. There is also a risk of delayed onset nerve root pals or radiculitis, which I explained is potentially due to stretch injury or possibly due to delayed onset swelling, and is sometimes temporary but can also be permanent.  Risk of incomplete decompression which might require revision surgery in the future.  Risk of adjacent segment problems requiring surgery in the future. Risk of incomplete relief of symptoms possibly requiring revision surgery in the future. Furthermore, although rare, there are risks of major vessel injury such as to the major vessels anteriorly or to the bowel from the surgery.  Sometimes this can happen if an instrument is passed anteriorly through the disc space. There is a risk of nonunion which might require revision surgery in the future, and risk of hardware complications from the instrumentation.  I do use imaging to help guide the placement of the instrumentation, but even with this there is a chance of implant malposition or problem.  There is a risk of blood clots in the legs or the lungs.  Lastly, although rare, there are certainly risks of the anesthetic including stroke heart attack and death.    3. I explained that we used Stealth navigation with an O-Arm to place the instrumentation.  This is a form of CT-guided navigation for the screws.  We take an intraoperative CT scan which provides an accurate view of the bony anatomy and we then place the screws using the imaging guidance and then take a second CT scan to help verify the screw  position. So the benefit of this technology is a high accuracy for screw placement.  4. We talked about the postoperative recovery period, the need for activity avoidance and the fact that it takes 6-12 months to get bony healing before she can return to full activities.  She does understand this and she would like to proceed.  We will try to get things arranged for her.  5. All questions were answered today and we again reviewed the above plans and she wishes to proceed.    Again, thank you for allowing me to participate in the care of your patient.      Sincerely,    Santy Goyal MD

## 2019-03-05 ENCOUNTER — ANESTHESIA EVENT (OUTPATIENT)
Dept: SURGERY | Facility: CLINIC | Age: 49
End: 2019-03-05
Payer: COMMERCIAL

## 2019-03-06 ENCOUNTER — ANESTHESIA (OUTPATIENT)
Dept: SURGERY | Facility: CLINIC | Age: 49
End: 2019-03-06
Payer: COMMERCIAL

## 2019-03-06 ENCOUNTER — APPOINTMENT (OUTPATIENT)
Dept: CT IMAGING | Facility: CLINIC | Age: 49
End: 2019-03-06
Attending: ORTHOPAEDIC SURGERY
Payer: COMMERCIAL

## 2019-03-06 ENCOUNTER — APPOINTMENT (OUTPATIENT)
Dept: GENERAL RADIOLOGY | Facility: CLINIC | Age: 49
End: 2019-03-06
Attending: ORTHOPAEDIC SURGERY
Payer: COMMERCIAL

## 2019-03-06 ENCOUNTER — APPOINTMENT (OUTPATIENT)
Dept: MRI IMAGING | Facility: CLINIC | Age: 49
End: 2019-03-06
Attending: ORTHOPAEDIC SURGERY
Payer: COMMERCIAL

## 2019-03-06 ENCOUNTER — HOSPITAL ENCOUNTER (INPATIENT)
Facility: CLINIC | Age: 49
LOS: 4 days | Discharge: HOME | End: 2019-03-10
Attending: ORTHOPAEDIC SURGERY | Admitting: ORTHOPAEDIC SURGERY
Payer: COMMERCIAL

## 2019-03-06 DIAGNOSIS — Z98.890 S/P SPINAL SURGERY: Primary | ICD-10-CM

## 2019-03-06 LAB
ABO + RH BLD: NORMAL
ABO + RH BLD: NORMAL
ANION GAP SERPL CALCULATED.3IONS-SCNC: 10 MMOL/L (ref 3–14)
BASE EXCESS BLDV CALC-SCNC: 0.2 MMOL/L
BLD GP AB SCN SERPL QL: NORMAL
BLOOD BANK CMNT PATIENT-IMP: NORMAL
BUN SERPL-MCNC: 11 MG/DL (ref 7–30)
CALCIUM SERPL-MCNC: 8 MG/DL (ref 8.5–10.1)
CHLORIDE SERPL-SCNC: 107 MMOL/L (ref 94–109)
CO2 SERPL-SCNC: 22 MMOL/L (ref 20–32)
CREAT SERPL-MCNC: 0.75 MG/DL (ref 0.52–1.04)
ERYTHROCYTE [DISTWIDTH] IN BLOOD BY AUTOMATED COUNT: 12.8 % (ref 10–15)
GFR SERPL CREATININE-BSD FRML MDRD: >90 ML/MIN/{1.73_M2}
GLUCOSE SERPL-MCNC: 133 MG/DL (ref 70–99)
GLUCOSE SERPL-MCNC: 98 MG/DL (ref 70–99)
HCG UR QL: NEGATIVE
HCO3 BLDV-SCNC: 24 MMOL/L (ref 21–28)
HCT VFR BLD AUTO: 33.5 % (ref 35–47)
HGB BLD-MCNC: 10.9 G/DL (ref 11.7–15.7)
HGB BLD-MCNC: 13.7 G/DL (ref 11.7–15.7)
LACTATE BLD-SCNC: 3.4 MMOL/L (ref 0.7–2)
LACTATE BLD-SCNC: 3.5 MMOL/L (ref 0.7–2)
MCH RBC QN AUTO: 29.8 PG (ref 26.5–33)
MCHC RBC AUTO-ENTMCNC: 32.5 G/DL (ref 31.5–36.5)
MCV RBC AUTO: 92 FL (ref 78–100)
O2/TOTAL GAS SETTING VFR VENT: ABNORMAL %
PCO2 BLDV: 35 MM HG (ref 40–50)
PH BLDV: 7.45 PH (ref 7.32–7.43)
PLATELET # BLD AUTO: 174 10E9/L (ref 150–450)
PO2 BLDV: 20 MM HG (ref 25–47)
POTASSIUM SERPL-SCNC: 3.9 MMOL/L (ref 3.4–5.3)
RBC # BLD AUTO: 3.66 10E12/L (ref 3.8–5.2)
SODIUM SERPL-SCNC: 139 MMOL/L (ref 133–144)
SPECIMEN EXP DATE BLD: NORMAL
WBC # BLD AUTO: 14.9 10E9/L (ref 4–11)

## 2019-03-06 PROCEDURE — 25000132 ZZH RX MED GY IP 250 OP 250 PS 637: Performed by: NURSE ANESTHETIST, CERTIFIED REGISTERED

## 2019-03-06 PROCEDURE — 25000128 H RX IP 250 OP 636: Performed by: PHYSICIAN ASSISTANT

## 2019-03-06 PROCEDURE — 82803 BLOOD GASES ANY COMBINATION: CPT | Performed by: INTERNAL MEDICINE

## 2019-03-06 PROCEDURE — 27810169 ZZH OR IMPLANT GENERAL: Performed by: ORTHOPAEDIC SURGERY

## 2019-03-06 PROCEDURE — 0SG30AJ FUSION OF LUMBOSACRAL JOINT WITH INTERBODY FUSION DEVICE, POSTERIOR APPROACH, ANTERIOR COLUMN, OPEN APPROACH: ICD-10-PCS | Performed by: ORTHOPAEDIC SURGERY

## 2019-03-06 PROCEDURE — 25000128 H RX IP 250 OP 636: Performed by: NURSE ANESTHETIST, CERTIFIED REGISTERED

## 2019-03-06 PROCEDURE — 0SG00AJ FUSION OF LUMBAR VERTEBRAL JOINT WITH INTERBODY FUSION DEVICE, POSTERIOR APPROACH, ANTERIOR COLUMN, OPEN APPROACH: ICD-10-PCS | Performed by: ORTHOPAEDIC SURGERY

## 2019-03-06 PROCEDURE — C1762 CONN TISS, HUMAN(INC FASCIA): HCPCS | Performed by: ORTHOPAEDIC SURGERY

## 2019-03-06 PROCEDURE — 25800030 ZZH RX IP 258 OP 636: Performed by: INTERNAL MEDICINE

## 2019-03-06 PROCEDURE — 25000128 H RX IP 250 OP 636

## 2019-03-06 PROCEDURE — 25000125 ZZHC RX 250: Performed by: ANESTHESIOLOGY

## 2019-03-06 PROCEDURE — 36000078 ZZH SURGERY LEVEL 6 W FLUORO 1ST 30 MIN - UMMC: Performed by: ORTHOPAEDIC SURGERY

## 2019-03-06 PROCEDURE — 25800030 ZZH RX IP 258 OP 636

## 2019-03-06 PROCEDURE — 25800030 ZZH RX IP 258 OP 636: Performed by: ANESTHESIOLOGY

## 2019-03-06 PROCEDURE — 85018 HEMOGLOBIN: CPT | Performed by: ANESTHESIOLOGY

## 2019-03-06 PROCEDURE — 80048 BASIC METABOLIC PNL TOTAL CA: CPT | Performed by: INTERNAL MEDICINE

## 2019-03-06 PROCEDURE — 71000015 ZZH RECOVERY PHASE 1 LEVEL 2 EA ADDTL HR: Performed by: ORTHOPAEDIC SURGERY

## 2019-03-06 PROCEDURE — 86850 RBC ANTIBODY SCREEN: CPT | Performed by: PHYSICIAN ASSISTANT

## 2019-03-06 PROCEDURE — 27210995 ZZH RX 272: Performed by: ORTHOPAEDIC SURGERY

## 2019-03-06 PROCEDURE — 36000076 ZZH SURGERY LEVEL 6 EA 15 ADDTL MIN - UMMC: Performed by: ORTHOPAEDIC SURGERY

## 2019-03-06 PROCEDURE — 40000170 ZZH STATISTIC PRE-PROCEDURE ASSESSMENT II: Performed by: ORTHOPAEDIC SURGERY

## 2019-03-06 PROCEDURE — 25000125 ZZHC RX 250: Performed by: ORTHOPAEDIC SURGERY

## 2019-03-06 PROCEDURE — 25000125 ZZHC RX 250: Performed by: NURSE ANESTHETIST, CERTIFIED REGISTERED

## 2019-03-06 PROCEDURE — 25000132 ZZH RX MED GY IP 250 OP 250 PS 637: Performed by: PHYSICIAN ASSISTANT

## 2019-03-06 PROCEDURE — 25000128 H RX IP 250 OP 636: Performed by: ANESTHESIOLOGY

## 2019-03-06 PROCEDURE — 85027 COMPLETE CBC AUTOMATED: CPT | Performed by: INTERNAL MEDICINE

## 2019-03-06 PROCEDURE — 86900 BLOOD TYPING SEROLOGIC ABO: CPT | Performed by: PHYSICIAN ASSISTANT

## 2019-03-06 PROCEDURE — 25000566 ZZH SEVOFLURANE, EA 15 MIN: Performed by: ORTHOPAEDIC SURGERY

## 2019-03-06 PROCEDURE — 81025 URINE PREGNANCY TEST: CPT | Performed by: ANESTHESIOLOGY

## 2019-03-06 PROCEDURE — 27210794 ZZH OR GENERAL SUPPLY STERILE: Performed by: ORTHOPAEDIC SURGERY

## 2019-03-06 PROCEDURE — C1713 ANCHOR/SCREW BN/BN,TIS/BN: HCPCS | Performed by: ORTHOPAEDIC SURGERY

## 2019-03-06 PROCEDURE — 25800030 ZZH RX IP 258 OP 636: Performed by: NURSE ANESTHETIST, CERTIFIED REGISTERED

## 2019-03-06 PROCEDURE — 72148 MRI LUMBAR SPINE W/O DYE: CPT

## 2019-03-06 PROCEDURE — 0SG3071 FUSION OF LUMBOSACRAL JOINT WITH AUTOLOGOUS TISSUE SUBSTITUTE, POSTERIOR APPROACH, POSTERIOR COLUMN, OPEN APPROACH: ICD-10-PCS | Performed by: ORTHOPAEDIC SURGERY

## 2019-03-06 PROCEDURE — 71000014 ZZH RECOVERY PHASE 1 LEVEL 2 FIRST HR: Performed by: ORTHOPAEDIC SURGERY

## 2019-03-06 PROCEDURE — 72131 CT LUMBAR SPINE W/O DYE: CPT

## 2019-03-06 PROCEDURE — 86901 BLOOD TYPING SEROLOGIC RH(D): CPT | Performed by: PHYSICIAN ASSISTANT

## 2019-03-06 PROCEDURE — 82947 ASSAY GLUCOSE BLOOD QUANT: CPT | Performed by: ANESTHESIOLOGY

## 2019-03-06 PROCEDURE — 25000128 H RX IP 250 OP 636: Performed by: ORTHOPAEDIC SURGERY

## 2019-03-06 PROCEDURE — 99207 ZZC MOONLIGHTING INDICATOR: CPT | Performed by: INTERNAL MEDICINE

## 2019-03-06 PROCEDURE — 37000008 ZZH ANESTHESIA TECHNICAL FEE, 1ST 30 MIN: Performed by: ORTHOPAEDIC SURGERY

## 2019-03-06 PROCEDURE — 0SG0071 FUSION OF LUMBAR VERTEBRAL JOINT WITH AUTOLOGOUS TISSUE SUBSTITUTE, POSTERIOR APPROACH, POSTERIOR COLUMN, OPEN APPROACH: ICD-10-PCS | Performed by: ORTHOPAEDIC SURGERY

## 2019-03-06 PROCEDURE — 12000001 ZZH R&B MED SURG/OB UMMC

## 2019-03-06 PROCEDURE — 83605 ASSAY OF LACTIC ACID: CPT | Performed by: INTERNAL MEDICINE

## 2019-03-06 PROCEDURE — 27211020 ZZHC OR CELL SAVER OPNP: Performed by: ORTHOPAEDIC SURGERY

## 2019-03-06 PROCEDURE — 37000009 ZZH ANESTHESIA TECHNICAL FEE, EACH ADDTL 15 MIN: Performed by: ORTHOPAEDIC SURGERY

## 2019-03-06 PROCEDURE — 36415 COLL VENOUS BLD VENIPUNCTURE: CPT | Performed by: INTERNAL MEDICINE

## 2019-03-06 PROCEDURE — 40000278 XR SURGERY CARM FLUORO LESS THAN 5 MIN: Mod: TC

## 2019-03-06 PROCEDURE — 0QB20ZZ EXCISION OF RIGHT PELVIC BONE, OPEN APPROACH: ICD-10-PCS | Performed by: ORTHOPAEDIC SURGERY

## 2019-03-06 DEVICE — IMPLANTABLE DEVICE: Type: IMPLANTABLE DEVICE | Site: SPINE LUMBAR | Status: FUNCTIONAL

## 2019-03-06 DEVICE — IMP SCR MEDT 5.5/6.0MM SOLERA 7.5X55MM MA 55840007555: Type: IMPLANTABLE DEVICE | Site: SPINE LUMBAR | Status: FUNCTIONAL

## 2019-03-06 DEVICE — IMP SCR MEDT 5.5/6.0MM SOLERA 7.5X50MM MA 55840007550: Type: IMPLANTABLE DEVICE | Site: SPINE LUMBAR | Status: FUNCTIONAL

## 2019-03-06 DEVICE — IMP SPACER MEDT CAPSTONE CONTROL 13X22MM 18DEG 4021322: Type: IMPLANTABLE DEVICE | Site: SPINE LUMBAR | Status: FUNCTIONAL

## 2019-03-06 DEVICE — IMP SPACER MEDT CAPSTONE CONTROL 15X22MM 18DEG 4021522: Type: IMPLANTABLE DEVICE | Site: SPINE LUMBAR | Status: FUNCTIONAL

## 2019-03-06 DEVICE — GRAFT BONE CRUSH CANC 30ML 400080: Type: IMPLANTABLE DEVICE | Site: SPINE LUMBAR | Status: FUNCTIONAL

## 2019-03-06 DEVICE — IMP SCR MEDT 5.5/6.0MM SOLERA 7.5X40MM MA 55840007540: Type: IMPLANTABLE DEVICE | Site: SPINE LUMBAR | Status: FUNCTIONAL

## 2019-03-06 DEVICE — IMP SCR SET MEDT SOLERA BREAK OFF 5.5MM TI 5540030: Type: IMPLANTABLE DEVICE | Site: SPINE LUMBAR | Status: FUNCTIONAL

## 2019-03-06 DEVICE — IMP SCR MEDT 5.5/6.0MM SOLERA 7.5X45MM MA 55840007545: Type: IMPLANTABLE DEVICE | Site: SPINE LUMBAR | Status: FUNCTIONAL

## 2019-03-06 RX ORDER — KETAMINE HYDROCHLORIDE 10 MG/ML
INJECTION, SOLUTION INTRAMUSCULAR; INTRAVENOUS PRN
Status: DISCONTINUED | OUTPATIENT
Start: 2019-03-06 | End: 2019-03-06

## 2019-03-06 RX ORDER — NALOXONE HYDROCHLORIDE 0.4 MG/ML
.1-.4 INJECTION, SOLUTION INTRAMUSCULAR; INTRAVENOUS; SUBCUTANEOUS
Status: DISCONTINUED | OUTPATIENT
Start: 2019-03-06 | End: 2019-03-06

## 2019-03-06 RX ORDER — ONDANSETRON 4 MG/1
4 TABLET, ORALLY DISINTEGRATING ORAL EVERY 6 HOURS PRN
Status: DISCONTINUED | OUTPATIENT
Start: 2019-03-06 | End: 2019-03-06

## 2019-03-06 RX ORDER — FENTANYL CITRATE 50 UG/ML
25-50 INJECTION, SOLUTION INTRAMUSCULAR; INTRAVENOUS
Status: DISCONTINUED | OUTPATIENT
Start: 2019-03-06 | End: 2019-03-06 | Stop reason: HOSPADM

## 2019-03-06 RX ORDER — SODIUM CHLORIDE, SODIUM LACTATE, POTASSIUM CHLORIDE, CALCIUM CHLORIDE 600; 310; 30; 20 MG/100ML; MG/100ML; MG/100ML; MG/100ML
INJECTION, SOLUTION INTRAVENOUS CONTINUOUS PRN
Status: DISCONTINUED | OUTPATIENT
Start: 2019-03-06 | End: 2019-03-06

## 2019-03-06 RX ORDER — VANCOMYCIN HCL 900 MCG/MG
POWDER (GRAM) MISCELLANEOUS PRN
Status: DISCONTINUED | OUTPATIENT
Start: 2019-03-06 | End: 2019-03-06 | Stop reason: HOSPADM

## 2019-03-06 RX ORDER — DIPHENHYDRAMINE HCL 25 MG
25 CAPSULE ORAL EVERY 6 HOURS PRN
Status: DISCONTINUED | OUTPATIENT
Start: 2019-03-06 | End: 2019-03-10 | Stop reason: HOSPADM

## 2019-03-06 RX ORDER — DIMENHYDRINATE 50 MG/ML
12.5 INJECTION, SOLUTION INTRAMUSCULAR; INTRAVENOUS
Status: DISCONTINUED | OUTPATIENT
Start: 2019-03-06 | End: 2019-03-06 | Stop reason: HOSPADM

## 2019-03-06 RX ORDER — POLYETHYLENE GLYCOL 3350 17 G/17G
17 POWDER, FOR SOLUTION ORAL DAILY
Status: DISCONTINUED | OUTPATIENT
Start: 2019-03-06 | End: 2019-03-07

## 2019-03-06 RX ORDER — DEXAMETHASONE SODIUM PHOSPHATE 4 MG/ML
10 INJECTION, SOLUTION INTRA-ARTICULAR; INTRALESIONAL; INTRAMUSCULAR; INTRAVENOUS; SOFT TISSUE EVERY 6 HOURS
Status: DISCONTINUED | OUTPATIENT
Start: 2019-03-06 | End: 2019-03-07

## 2019-03-06 RX ORDER — METHADONE HYDROCHLORIDE 10 MG/ML
10 INJECTION, SOLUTION INTRAMUSCULAR; INTRAVENOUS; SUBCUTANEOUS ONCE
Status: DISCONTINUED | OUTPATIENT
Start: 2019-03-06 | End: 2019-03-06 | Stop reason: HOSPADM

## 2019-03-06 RX ORDER — METOCLOPRAMIDE HYDROCHLORIDE 5 MG/ML
10 INJECTION INTRAMUSCULAR; INTRAVENOUS EVERY 6 HOURS PRN
Status: DISCONTINUED | OUTPATIENT
Start: 2019-03-06 | End: 2019-03-06

## 2019-03-06 RX ORDER — CEFAZOLIN SODIUM 1 G/3ML
1 INJECTION, POWDER, FOR SOLUTION INTRAMUSCULAR; INTRAVENOUS EVERY 8 HOURS
Status: COMPLETED | OUTPATIENT
Start: 2019-03-06 | End: 2019-03-07

## 2019-03-06 RX ORDER — PROPRANOLOL HYDROCHLORIDE 120 MG/1
120 CAPSULE, EXTENDED RELEASE ORAL
Status: DISCONTINUED | OUTPATIENT
Start: 2019-03-06 | End: 2019-03-10 | Stop reason: HOSPADM

## 2019-03-06 RX ORDER — DIPHENHYDRAMINE HYDROCHLORIDE 50 MG/ML
25 INJECTION INTRAMUSCULAR; INTRAVENOUS EVERY 6 HOURS PRN
Status: DISCONTINUED | OUTPATIENT
Start: 2019-03-06 | End: 2019-03-06

## 2019-03-06 RX ORDER — GABAPENTIN 300 MG/1
900 CAPSULE ORAL AT BEDTIME
Status: DISCONTINUED | OUTPATIENT
Start: 2019-03-06 | End: 2019-03-10 | Stop reason: HOSPADM

## 2019-03-06 RX ORDER — DIPHENHYDRAMINE HYDROCHLORIDE 50 MG/ML
25 INJECTION INTRAMUSCULAR; INTRAVENOUS EVERY 6 HOURS PRN
Status: DISCONTINUED | OUTPATIENT
Start: 2019-03-06 | End: 2019-03-10 | Stop reason: HOSPADM

## 2019-03-06 RX ORDER — PROCHLORPERAZINE 25 MG
25 SUPPOSITORY, RECTAL RECTAL EVERY 12 HOURS PRN
Status: DISCONTINUED | OUTPATIENT
Start: 2019-03-06 | End: 2019-03-10 | Stop reason: HOSPADM

## 2019-03-06 RX ORDER — SODIUM CHLORIDE, SODIUM LACTATE, POTASSIUM CHLORIDE, CALCIUM CHLORIDE 600; 310; 30; 20 MG/100ML; MG/100ML; MG/100ML; MG/100ML
INJECTION, SOLUTION INTRAVENOUS CONTINUOUS
Status: DISCONTINUED | OUTPATIENT
Start: 2019-03-06 | End: 2019-03-09

## 2019-03-06 RX ORDER — AMOXICILLIN 250 MG
2 CAPSULE ORAL 2 TIMES DAILY
Status: DISCONTINUED | OUTPATIENT
Start: 2019-03-06 | End: 2019-03-06

## 2019-03-06 RX ORDER — HYDROMORPHONE HYDROCHLORIDE 1 MG/ML
.3-.5 INJECTION, SOLUTION INTRAMUSCULAR; INTRAVENOUS; SUBCUTANEOUS
Status: DISCONTINUED | OUTPATIENT
Start: 2019-03-06 | End: 2019-03-07

## 2019-03-06 RX ORDER — METOCLOPRAMIDE 10 MG/1
10 TABLET ORAL EVERY 6 HOURS PRN
Status: DISCONTINUED | OUTPATIENT
Start: 2019-03-06 | End: 2019-03-06

## 2019-03-06 RX ORDER — AMOXICILLIN 250 MG
2 CAPSULE ORAL 2 TIMES DAILY
Status: DISCONTINUED | OUTPATIENT
Start: 2019-03-06 | End: 2019-03-10 | Stop reason: HOSPADM

## 2019-03-06 RX ORDER — LIDOCAINE 40 MG/G
CREAM TOPICAL
Status: DISCONTINUED | OUTPATIENT
Start: 2019-03-06 | End: 2019-03-06 | Stop reason: HOSPADM

## 2019-03-06 RX ORDER — GABAPENTIN 300 MG/1
300 CAPSULE ORAL
Status: DISCONTINUED | OUTPATIENT
Start: 2019-03-07 | End: 2019-03-10 | Stop reason: HOSPADM

## 2019-03-06 RX ORDER — METHADONE HYDROCHLORIDE 10 MG/ML
10 INJECTION, SOLUTION INTRAMUSCULAR; INTRAVENOUS; SUBCUTANEOUS ONCE
Status: COMPLETED | OUTPATIENT
Start: 2019-03-06 | End: 2019-03-06

## 2019-03-06 RX ORDER — PROPOFOL 10 MG/ML
INJECTION, EMULSION INTRAVENOUS CONTINUOUS PRN
Status: DISCONTINUED | OUTPATIENT
Start: 2019-03-06 | End: 2019-03-06

## 2019-03-06 RX ORDER — PROCHLORPERAZINE MALEATE 5 MG
10 TABLET ORAL EVERY 6 HOURS PRN
Status: DISCONTINUED | OUTPATIENT
Start: 2019-03-06 | End: 2019-03-10 | Stop reason: HOSPADM

## 2019-03-06 RX ORDER — METOCLOPRAMIDE HYDROCHLORIDE 5 MG/ML
10 INJECTION INTRAMUSCULAR; INTRAVENOUS EVERY 6 HOURS PRN
Status: DISCONTINUED | OUTPATIENT
Start: 2019-03-06 | End: 2019-03-10 | Stop reason: HOSPADM

## 2019-03-06 RX ORDER — GABAPENTIN 300 MG/1
300 CAPSULE ORAL EVERY MORNING
Status: DISCONTINUED | OUTPATIENT
Start: 2019-03-07 | End: 2019-03-10 | Stop reason: HOSPADM

## 2019-03-06 RX ORDER — ACETAMINOPHEN 325 MG/1
650 TABLET ORAL EVERY 4 HOURS PRN
Status: DISCONTINUED | OUTPATIENT
Start: 2019-03-06 | End: 2019-03-10 | Stop reason: HOSPADM

## 2019-03-06 RX ORDER — LIDOCAINE 40 MG/G
CREAM TOPICAL
Status: DISCONTINUED | OUTPATIENT
Start: 2019-03-06 | End: 2019-03-10 | Stop reason: HOSPADM

## 2019-03-06 RX ORDER — OXYCODONE AND ACETAMINOPHEN 5; 325 MG/1; MG/1
1 TABLET ORAL EVERY 6 HOURS PRN
Status: ON HOLD | COMMUNITY
End: 2019-03-08

## 2019-03-06 RX ORDER — CEFAZOLIN SODIUM 1 G/3ML
1 INJECTION, POWDER, FOR SOLUTION INTRAMUSCULAR; INTRAVENOUS SEE ADMIN INSTRUCTIONS
Status: DISCONTINUED | OUTPATIENT
Start: 2019-03-06 | End: 2019-03-06 | Stop reason: HOSPADM

## 2019-03-06 RX ORDER — DIPHENHYDRAMINE HCL 25 MG
25 CAPSULE ORAL EVERY 6 HOURS PRN
Status: DISCONTINUED | OUTPATIENT
Start: 2019-03-06 | End: 2019-03-06

## 2019-03-06 RX ORDER — SODIUM CHLORIDE, SODIUM LACTATE, POTASSIUM CHLORIDE, CALCIUM CHLORIDE 600; 310; 30; 20 MG/100ML; MG/100ML; MG/100ML; MG/100ML
INJECTION, SOLUTION INTRAVENOUS
Status: COMPLETED
Start: 2019-03-06 | End: 2019-03-06

## 2019-03-06 RX ORDER — POLYETHYLENE GLYCOL 3350 17 G/17G
1 POWDER, FOR SOLUTION ORAL PRN
COMMUNITY

## 2019-03-06 RX ORDER — NALOXONE HYDROCHLORIDE 0.4 MG/ML
.1-.4 INJECTION, SOLUTION INTRAMUSCULAR; INTRAVENOUS; SUBCUTANEOUS
Status: ACTIVE | OUTPATIENT
Start: 2019-03-06 | End: 2019-03-07

## 2019-03-06 RX ORDER — ONDANSETRON 2 MG/ML
INJECTION INTRAMUSCULAR; INTRAVENOUS PRN
Status: DISCONTINUED | OUTPATIENT
Start: 2019-03-06 | End: 2019-03-06

## 2019-03-06 RX ORDER — AMOXICILLIN 250 MG
1 CAPSULE ORAL 2 TIMES DAILY
Status: DISCONTINUED | OUTPATIENT
Start: 2019-03-06 | End: 2019-03-06

## 2019-03-06 RX ORDER — SCOLOPAMINE TRANSDERMAL SYSTEM 1 MG/1
1 PATCH, EXTENDED RELEASE TRANSDERMAL ONCE
Status: COMPLETED | OUTPATIENT
Start: 2019-03-06 | End: 2019-03-06

## 2019-03-06 RX ORDER — ONDANSETRON 4 MG/1
4 TABLET, ORALLY DISINTEGRATING ORAL EVERY 30 MIN PRN
Status: DISCONTINUED | OUTPATIENT
Start: 2019-03-06 | End: 2019-03-06 | Stop reason: HOSPADM

## 2019-03-06 RX ORDER — LIDOCAINE HYDROCHLORIDE 20 MG/ML
INJECTION, SOLUTION INFILTRATION; PERINEURAL PRN
Status: DISCONTINUED | OUTPATIENT
Start: 2019-03-06 | End: 2019-03-06

## 2019-03-06 RX ORDER — AMOXICILLIN 250 MG
1 CAPSULE ORAL 2 TIMES DAILY
Status: DISCONTINUED | OUTPATIENT
Start: 2019-03-06 | End: 2019-03-10 | Stop reason: HOSPADM

## 2019-03-06 RX ORDER — HYDROMORPHONE HYDROCHLORIDE 1 MG/ML
.3-.5 INJECTION, SOLUTION INTRAMUSCULAR; INTRAVENOUS; SUBCUTANEOUS EVERY 5 MIN PRN
Status: DISCONTINUED | OUTPATIENT
Start: 2019-03-06 | End: 2019-03-06 | Stop reason: HOSPADM

## 2019-03-06 RX ORDER — GLYCOPYRROLATE 0.2 MG/ML
INJECTION, SOLUTION INTRAMUSCULAR; INTRAVENOUS PRN
Status: DISCONTINUED | OUTPATIENT
Start: 2019-03-06 | End: 2019-03-06

## 2019-03-06 RX ORDER — METOCLOPRAMIDE 10 MG/1
10 TABLET ORAL EVERY 6 HOURS PRN
Status: DISCONTINUED | OUTPATIENT
Start: 2019-03-06 | End: 2019-03-10 | Stop reason: HOSPADM

## 2019-03-06 RX ORDER — FENTANYL CITRATE 50 UG/ML
INJECTION, SOLUTION INTRAMUSCULAR; INTRAVENOUS PRN
Status: DISCONTINUED | OUTPATIENT
Start: 2019-03-06 | End: 2019-03-06

## 2019-03-06 RX ORDER — DIAZEPAM 5 MG
5 TABLET ORAL EVERY 6 HOURS PRN
Status: DISCONTINUED | OUTPATIENT
Start: 2019-03-06 | End: 2019-03-10 | Stop reason: HOSPADM

## 2019-03-06 RX ORDER — EPHEDRINE SULFATE 50 MG/ML
INJECTION, SOLUTION INTRAMUSCULAR; INTRAVENOUS; SUBCUTANEOUS PRN
Status: DISCONTINUED | OUTPATIENT
Start: 2019-03-06 | End: 2019-03-06

## 2019-03-06 RX ORDER — ONDANSETRON 4 MG/1
4 TABLET, ORALLY DISINTEGRATING ORAL EVERY 6 HOURS PRN
Status: DISCONTINUED | OUTPATIENT
Start: 2019-03-06 | End: 2019-03-10 | Stop reason: HOSPADM

## 2019-03-06 RX ORDER — TRAZODONE HYDROCHLORIDE 50 MG/1
50 TABLET, FILM COATED ORAL AT BEDTIME
Status: DISCONTINUED | OUTPATIENT
Start: 2019-03-06 | End: 2019-03-10 | Stop reason: HOSPADM

## 2019-03-06 RX ORDER — PROCHLORPERAZINE MALEATE 5 MG
10 TABLET ORAL EVERY 6 HOURS PRN
Status: DISCONTINUED | OUTPATIENT
Start: 2019-03-06 | End: 2019-03-06

## 2019-03-06 RX ORDER — CALCIUM CARBONATE 500 MG/1
1000 TABLET, CHEWABLE ORAL 4 TIMES DAILY PRN
Status: DISCONTINUED | OUTPATIENT
Start: 2019-03-06 | End: 2019-03-10 | Stop reason: HOSPADM

## 2019-03-06 RX ORDER — OXYCODONE HYDROCHLORIDE 5 MG/1
10 TABLET ORAL EVERY 4 HOURS PRN
Status: DISCONTINUED | OUTPATIENT
Start: 2019-03-06 | End: 2019-03-07

## 2019-03-06 RX ORDER — PROPRANOLOL HCL 60 MG
120 CAPSULE, EXTENDED RELEASE 24HR ORAL DAILY
Status: DISCONTINUED | OUTPATIENT
Start: 2019-03-06 | End: 2019-03-06

## 2019-03-06 RX ORDER — CEFAZOLIN SODIUM 2 G/100ML
2 INJECTION, SOLUTION INTRAVENOUS
Status: COMPLETED | OUTPATIENT
Start: 2019-03-06 | End: 2019-03-06

## 2019-03-06 RX ORDER — HYDROCODONE BITARTRATE AND ACETAMINOPHEN 5; 325 MG/1; MG/1
1-2 TABLET ORAL EVERY 4 HOURS PRN
Status: DISCONTINUED | OUTPATIENT
Start: 2019-03-06 | End: 2019-03-06

## 2019-03-06 RX ORDER — ONDANSETRON 2 MG/ML
4 INJECTION INTRAMUSCULAR; INTRAVENOUS EVERY 30 MIN PRN
Status: DISCONTINUED | OUTPATIENT
Start: 2019-03-06 | End: 2019-03-06 | Stop reason: HOSPADM

## 2019-03-06 RX ORDER — ONDANSETRON 2 MG/ML
4 INJECTION INTRAMUSCULAR; INTRAVENOUS EVERY 6 HOURS PRN
Status: DISCONTINUED | OUTPATIENT
Start: 2019-03-06 | End: 2019-03-10 | Stop reason: HOSPADM

## 2019-03-06 RX ORDER — SODIUM CHLORIDE, SODIUM LACTATE, POTASSIUM CHLORIDE, CALCIUM CHLORIDE 600; 310; 30; 20 MG/100ML; MG/100ML; MG/100ML; MG/100ML
INJECTION, SOLUTION INTRAVENOUS CONTINUOUS
Status: DISCONTINUED | OUTPATIENT
Start: 2019-03-06 | End: 2019-03-06 | Stop reason: HOSPADM

## 2019-03-06 RX ORDER — DIAZEPAM 10 MG/2ML
5 INJECTION, SOLUTION INTRAMUSCULAR; INTRAVENOUS ONCE
Status: COMPLETED | OUTPATIENT
Start: 2019-03-06 | End: 2019-03-06

## 2019-03-06 RX ORDER — ONDANSETRON 2 MG/ML
4 INJECTION INTRAMUSCULAR; INTRAVENOUS EVERY 6 HOURS PRN
Status: DISCONTINUED | OUTPATIENT
Start: 2019-03-06 | End: 2019-03-06

## 2019-03-06 RX ORDER — LABETALOL HYDROCHLORIDE 5 MG/ML
10 INJECTION, SOLUTION INTRAVENOUS
Status: DISCONTINUED | OUTPATIENT
Start: 2019-03-06 | End: 2019-03-06 | Stop reason: HOSPADM

## 2019-03-06 RX ORDER — PROPOFOL 10 MG/ML
INJECTION, EMULSION INTRAVENOUS PRN
Status: DISCONTINUED | OUTPATIENT
Start: 2019-03-06 | End: 2019-03-06

## 2019-03-06 RX ORDER — DEXAMETHASONE SODIUM PHOSPHATE 4 MG/ML
INJECTION, SOLUTION INTRA-ARTICULAR; INTRALESIONAL; INTRAMUSCULAR; INTRAVENOUS; SOFT TISSUE PRN
Status: DISCONTINUED | OUTPATIENT
Start: 2019-03-06 | End: 2019-03-06

## 2019-03-06 RX ORDER — DIAZEPAM 10 MG/2ML
5 INJECTION, SOLUTION INTRAMUSCULAR; INTRAVENOUS ONCE
Status: DISCONTINUED | OUTPATIENT
Start: 2019-03-06 | End: 2019-03-07

## 2019-03-06 RX ADMIN — CEFAZOLIN SODIUM 2 G: 2 INJECTION, SOLUTION INTRAVENOUS at 07:20

## 2019-03-06 RX ADMIN — ROCURONIUM BROMIDE 50 MG: 10 INJECTION INTRAVENOUS at 07:04

## 2019-03-06 RX ADMIN — FENTANYL CITRATE 50 MCG: 50 INJECTION, SOLUTION INTRAMUSCULAR; INTRAVENOUS at 12:40

## 2019-03-06 RX ADMIN — DEXAMETHASONE SODIUM PHOSPHATE 10 MG: 4 INJECTION, SOLUTION INTRAMUSCULAR; INTRAVENOUS at 07:30

## 2019-03-06 RX ADMIN — GLYCOPYRROLATE 0.2 MG: 0.2 INJECTION, SOLUTION INTRAMUSCULAR; INTRAVENOUS at 08:13

## 2019-03-06 RX ADMIN — SODIUM CHLORIDE, POTASSIUM CHLORIDE, SODIUM LACTATE AND CALCIUM CHLORIDE: 600; 310; 30; 20 INJECTION, SOLUTION INTRAVENOUS at 21:35

## 2019-03-06 RX ADMIN — Medication: at 21:01

## 2019-03-06 RX ADMIN — FENTANYL CITRATE 50 MCG: 50 INJECTION, SOLUTION INTRAMUSCULAR; INTRAVENOUS at 12:33

## 2019-03-06 RX ADMIN — Medication 5 MG: at 10:50

## 2019-03-06 RX ADMIN — PROPOFOL 200 MG: 10 INJECTION, EMULSION INTRAVENOUS at 07:03

## 2019-03-06 RX ADMIN — SODIUM CHLORIDE, POTASSIUM CHLORIDE, SODIUM LACTATE AND CALCIUM CHLORIDE: 600; 310; 30; 20 INJECTION, SOLUTION INTRAVENOUS at 07:45

## 2019-03-06 RX ADMIN — ROCURONIUM BROMIDE 20 MG: 10 INJECTION INTRAVENOUS at 08:34

## 2019-03-06 RX ADMIN — HYDROMORPHONE HYDROCHLORIDE 0.5 MG: 1 INJECTION, SOLUTION INTRAMUSCULAR; INTRAVENOUS; SUBCUTANEOUS at 14:16

## 2019-03-06 RX ADMIN — Medication 10 MG: at 08:04

## 2019-03-06 RX ADMIN — SODIUM CHLORIDE, POTASSIUM CHLORIDE, SODIUM LACTATE AND CALCIUM CHLORIDE 1000 ML: 600; 310; 30; 20 INJECTION, SOLUTION INTRAVENOUS at 17:42

## 2019-03-06 RX ADMIN — PHENYLEPHRINE HYDROCHLORIDE 0.3 MCG/KG/MIN: 10 INJECTION, SOLUTION INTRAMUSCULAR; INTRAVENOUS; SUBCUTANEOUS at 08:09

## 2019-03-06 RX ADMIN — Medication 20 MG: at 08:10

## 2019-03-06 RX ADMIN — DIAZEPAM 5 MG: 5 TABLET ORAL at 18:21

## 2019-03-06 RX ADMIN — SODIUM CHLORIDE, POTASSIUM CHLORIDE, SODIUM LACTATE AND CALCIUM CHLORIDE: 600; 310; 30; 20 INJECTION, SOLUTION INTRAVENOUS at 09:59

## 2019-03-06 RX ADMIN — FENTANYL CITRATE 50 MCG: 50 INJECTION, SOLUTION INTRAMUSCULAR; INTRAVENOUS at 11:25

## 2019-03-06 RX ADMIN — Medication 5 MG: at 08:01

## 2019-03-06 RX ADMIN — HYDROMORPHONE HYDROCHLORIDE 0.4 MG: 1 INJECTION, SOLUTION INTRAMUSCULAR; INTRAVENOUS; SUBCUTANEOUS at 11:58

## 2019-03-06 RX ADMIN — DEXAMETHASONE SODIUM PHOSPHATE 10 MG: 4 INJECTION, SOLUTION INTRAMUSCULAR; INTRAVENOUS at 18:22

## 2019-03-06 RX ADMIN — SUGAMMADEX 200 MG: 100 INJECTION, SOLUTION INTRAVENOUS at 11:14

## 2019-03-06 RX ADMIN — ROCURONIUM BROMIDE 30 MG: 10 INJECTION INTRAVENOUS at 07:45

## 2019-03-06 RX ADMIN — CEFAZOLIN 1 G: 1 INJECTION, POWDER, FOR SOLUTION INTRAMUSCULAR; INTRAVENOUS at 17:41

## 2019-03-06 RX ADMIN — SODIUM CHLORIDE, POTASSIUM CHLORIDE, SODIUM LACTATE AND CALCIUM CHLORIDE 500 ML: 600; 310; 30; 20 INJECTION, SOLUTION INTRAVENOUS at 16:02

## 2019-03-06 RX ADMIN — PHENYLEPHRINE HYDROCHLORIDE 1 MCG/KG/MIN: 10 INJECTION, SOLUTION INTRAMUSCULAR; INTRAVENOUS; SUBCUTANEOUS at 08:32

## 2019-03-06 RX ADMIN — HYDROMORPHONE HYDROCHLORIDE 0.4 MG: 1 INJECTION, SOLUTION INTRAMUSCULAR; INTRAVENOUS; SUBCUTANEOUS at 12:19

## 2019-03-06 RX ADMIN — SODIUM CHLORIDE, POTASSIUM CHLORIDE, SODIUM LACTATE AND CALCIUM CHLORIDE 1000 ML: 600; 310; 30; 20 INJECTION, SOLUTION INTRAVENOUS at 18:30

## 2019-03-06 RX ADMIN — GABAPENTIN 900 MG: 300 CAPSULE ORAL at 21:28

## 2019-03-06 RX ADMIN — SODIUM CHLORIDE, POTASSIUM CHLORIDE, SODIUM LACTATE AND CALCIUM CHLORIDE: 600; 310; 30; 20 INJECTION, SOLUTION INTRAVENOUS at 06:52

## 2019-03-06 RX ADMIN — FENTANYL CITRATE 50 MCG: 50 INJECTION, SOLUTION INTRAMUSCULAR; INTRAVENOUS at 11:35

## 2019-03-06 RX ADMIN — Medication 10 MG: at 10:24

## 2019-03-06 RX ADMIN — FENTANYL CITRATE 50 MCG: 50 INJECTION, SOLUTION INTRAMUSCULAR; INTRAVENOUS at 13:20

## 2019-03-06 RX ADMIN — Medication 10 MG: at 09:13

## 2019-03-06 RX ADMIN — MIDAZOLAM 2 MG: 1 INJECTION INTRAMUSCULAR; INTRAVENOUS at 06:52

## 2019-03-06 RX ADMIN — Medication: at 14:01

## 2019-03-06 RX ADMIN — FENTANYL CITRATE 150 MCG: 50 INJECTION, SOLUTION INTRAMUSCULAR; INTRAVENOUS at 07:45

## 2019-03-06 RX ADMIN — DIAZEPAM 2.5 MG: 5 INJECTION, SOLUTION INTRAMUSCULAR; INTRAVENOUS at 13:03

## 2019-03-06 RX ADMIN — Medication 10 MG: at 07:50

## 2019-03-06 RX ADMIN — FENTANYL CITRATE 50 MCG: 50 INJECTION, SOLUTION INTRAMUSCULAR; INTRAVENOUS at 11:31

## 2019-03-06 RX ADMIN — PHENYLEPHRINE HYDROCHLORIDE 100 MCG: 10 INJECTION, SOLUTION INTRAMUSCULAR; INTRAVENOUS; SUBCUTANEOUS at 08:04

## 2019-03-06 RX ADMIN — LIDOCAINE HYDROCHLORIDE 60 MG: 20 INJECTION, SOLUTION INFILTRATION; PERINEURAL at 07:03

## 2019-03-06 RX ADMIN — Medication 5 MG: at 09:58

## 2019-03-06 RX ADMIN — HYDROMORPHONE HYDROCHLORIDE 0.4 MG: 1 INJECTION, SOLUTION INTRAMUSCULAR; INTRAVENOUS; SUBCUTANEOUS at 12:08

## 2019-03-06 RX ADMIN — POLYETHYLENE GLYCOL 400 AND PROPYLENE GLYCOL 2 DROP: 4; 3 SOLUTION/ DROPS OPHTHALMIC at 07:11

## 2019-03-06 RX ADMIN — SODIUM CHLORIDE, POTASSIUM CHLORIDE, SODIUM LACTATE AND CALCIUM CHLORIDE 1000 ML: 600; 310; 30; 20 INJECTION, SOLUTION INTRAVENOUS at 12:09

## 2019-03-06 RX ADMIN — Medication 10 MG: at 11:23

## 2019-03-06 RX ADMIN — TRAZODONE HYDROCHLORIDE 50 MG: 50 TABLET ORAL at 21:27

## 2019-03-06 RX ADMIN — PROPOFOL 50 MCG/KG/MIN: 10 INJECTION, EMULSION INTRAVENOUS at 07:30

## 2019-03-06 RX ADMIN — LIDOCAINE HYDROCHLORIDE 70 MG: 20 INJECTION, SOLUTION INFILTRATION; PERINEURAL at 07:03

## 2019-03-06 RX ADMIN — CEFAZOLIN 1 G: 1 INJECTION, POWDER, FOR SOLUTION INTRAMUSCULAR; INTRAVENOUS at 09:20

## 2019-03-06 RX ADMIN — ONDANSETRON 4 MG: 2 INJECTION INTRAMUSCULAR; INTRAVENOUS at 10:49

## 2019-03-06 RX ADMIN — ROCURONIUM BROMIDE 20 MG: 10 INJECTION INTRAVENOUS at 09:16

## 2019-03-06 RX ADMIN — SCOPOLAMINE 1 PATCH: 1 PATCH, EXTENDED RELEASE TRANSDERMAL at 06:50

## 2019-03-06 ASSESSMENT — ACTIVITIES OF DAILY LIVING (ADL)
ADLS_ACUITY_SCORE: 13
ADLS_ACUITY_SCORE: 13

## 2019-03-06 NOTE — OP NOTE
DATE OF SURGERY: 3/6/2019    PREOPERATIVE DIAGNOSIS: Degenerative Spondylolisthesis Primary, Spinal Stenosis Lumbar Region with Neurogenic Claudication             POSTOPERATIVE DIAGNOSIS: Same    PROCEDURES:  1. Bowden Gramajo osteotomy L4-5 and L5-S1 for the purpose of regional deformity correction  2. Transforaminal lumbar interbody fusion at L4-5 and L5-S1, anterior and posterior fusion through a single approach.  3. L4-5 and L5-S1 Capstone PEEK Cage.  4. L4 through S1 Posterior Spinal Fusion.  5. L4 through S1 Posterior Spinal Instrumentation, Medtronic Solera.  6. Troy and use of Iliac Crest Autograft, which was harvested through a seperate fascial incision.  7. Use of O-Arm navigational guidance.  8. Use of Allograft    PRIMARY SURGEON: Santy Goyal MD    FIRST ASSISTANT: SAILAJA Guzman, there is no qualified resident available.  The assistant was necessary for retraction, visualization and wound closure and for placement of the instrumentation.    ANESTHESIA: General Endotracheal    COMPLICATIONS:  None.    SPECIMENS: None.    ESTIMATED BLOOD LOSS: 575 mL    INDICATIONS:                          Negrita Robledo is a 48 year old female who elected surgical treatment, and understood the indications for this surgery, as well as its risks, benefits, and alternatives as documented in the pre-operative H&P.  Specifically, we reviewed the risks and benefits of the surgery in detail. The risks include, but are not limited to, the general risks associated with anesthesia, including death, pulmonary embolism, DVT, stroke, myocardial infarction, pneumonia, and urinary tract infection. Additional risks specific to the surgery include the risk of infection, failure to heal (non-union), dural tear with resultant CSF leak which might necessitate placement of a drain or revision surgery or could result in headaches, nerve injury resulting in weakness or paralysis, risk of adjacent segment disease, the  risks of vascular injury, need for revision surgery in the future due to one of the above issues, or risk of incomplete symptom relief. Negrita Robledo understands the risks of the surgery and wishes to proceed. No guarantees were given.    DESCRIPTION OF PROCEDURE:           Negrita Robledo was taken to the operating  room, where the Anesthesiology Service induced satisfactory general anesthesia.  Ancef was given IV.  Venous thromboembolic prophylaxis was performed with sequential devices.  A Jauregui catheter was placed under standard sterile techniques.  The patient was placed prone on a pro-axis frame with the abdomen hanging free and all bony prominences well padded.  The low back was then prepped and draped in its entirety in the usual sterile fashion. We then held a multidisciplinary time out in which we verified the patient, procedure, antibiotics, and operative plan.  All team members were in agreement.    We began by incising down through the skin to the level of the fascia.  I elevated the fat over towards the right posterior superior iliac spine and incised the fascia over the posterior superior iliac spine and then inserted a Adrianne over the lateral table of the ilium.  I used an osteotome to breech the posterior superior iliac spine and take off the cortical bone.  I then used a curette to harvest about 40 cc of cancellus autograft.  This was saved for later use in the case.  We thoroughly irrigated.  I bone wax the wound placed vancomycin powder, a Marcaine soaked Gelfoam into the wound and then closed the fascia very tightly with 0 Vicryl pops.    We then continued our exposure and I performed bilateral subperiosteal exposure out over the facet joints into the tips of the transverse processes from L4-S1.  Once this was done we attached a reference frame to the spinous process of L5.  The navigation was brought in and we took a spin.  I then placed bilateral pedicle screws from L4-S1 in the  following fashion.  I made a start point with a bur.  I cannulated the pedicle with the navigated awl.  We then used the tap which was a 6.5 mm tap and then the screw was placed under manual power.  All screws were 7.5 mm in diameter.  We then brought the navigation back in and took an additional span and all the screws were intraosseous.    I then turned my attention to the Smith-Marks osteotomy and interbody fusion portion of the procedure.  I started at the L5-S1 level.  I took down the interspinous ligament with a narrow rondure.  I then placed a lamina  and took down the intra-articular process with an osteotome bilaterally.  I then started on the left side and took down the superior articular process in an outside in fashion with a 4 mm Kerrison until I could visualize the traversing nerve root.  I retracted the nerve root and then entered the disc space with a combination of pituitaries and scrapers to thoroughly clean it out and then I inserted a serial turn and rotate distractors until the disc space was dilated on this side.  Then turned to the right side and again in an outside in fashion took down the superior articular process the removal of the bilateral facet joints thus completed the Smith-Marks osteotomy at this level to allow for regional deformity correction.  I then identified the traversing nerve root, entered the disc space with a combination of pituitaries and scrapers and once it was thoroughly cleaned out we then trialed and I selected an 13 mm x 22 mm 18 degree lordotic cage.  This was filled with local autograft.  Meanwhile I finished cleaning out the disc, thoroughly irrigated and we packed 6 cc of local autograft into the disc.  We then inserted the cages bilaterally.  I verified the position fluoroscopically.  I did remove the remaining midline ligamentum flavum and I made sure there is no residual central or lateral recess compression.    I then turned my attention to the  L4-5 level. I took down the interspinous ligament with a narrow rondure.  I then placed a lamina  and took down the intra-articular process with an osteotome bilaterally.  I then started on the left side and took down the superior articular process in an outside in fashion with a 4 mm Kerrison until I could visualize the traversing nerve root.  I retracted the nerve root and then entered the disc space with a combination of pituitaries and scrapers to thoroughly clean it out and then I inserted a serial turn and rotate distractors until the disc space was dilated on this side.  Then turned to the right side and again in an outside in fashion took down the superior articular process the removal of the bilateral facet joints thus completed the Smith-Marks osteotomy at this level to allow for regional deformity correction.  I then identified the traversing nerve root, entered the disc space with a combination of pituitaries and scrapers and once it was thoroughly cleaned out we then trialed and I selected an 13 mm x 22 mm 18 degree lordotic cage.  This was filled with local autograft.  Meanwhile I finished cleaning out the disc, thoroughly irrigated and we packed 6 cc of local autograft into the disc.  We then inserted the cages bilaterally.  I verified the position fluoroscopically.  I noted that the cages were somewhat subsided into the superior endplate at this level but they seemed quite stable to mechanical perturbation and I chose to accept this.  I did remove the remaining midline ligamentum flavum and I made sure there is no residual central or lateral recess compression.    I then brought the table from neutral into 10 degrees of extension to try to close the osteotomy sites and give her some additional lordosis correction.  Bilateral rods and setscrews were placed.  I took our final images and then the setscrews were broken off.  I was satisfied with her regional deformity correction in the  position of our implants.    We thoroughly irrigated.  I then decorticated the remaining midline laminar bone and we placed the 40 cc of cancellus autograft and I broke up the Of cortical bone as well and this was placed into the interlaminar space to complete the posterior fusion from L4-S1.    We then achieved hemostasis.  A SARITA drain was placed deep to the fascia and 2 gram of vancomycin powder was placed into the wound.  We closed the wound in multiple layers of interrupted Vicryl, with monocryl and dermabond for the skin.  A Hemovac drain was placed above the fascia.  Upon closure, the patient was then transferred to a hospital bed and taken to recovery after extubation in stable condition.    I was present and scrubbed for the entire procedure.    Santy Goyal MD

## 2019-03-06 NOTE — OR NURSING
Dr. Ahmadi aware that patient blood pressure is now 92/59, 92/47 and 86 /51.  Baseline pressure when arrived today was 105/64.  Previous RN gave 500 cc Lactated Ringers.  MDA okay with no interventions at this time. Pt pain much more tolerable.

## 2019-03-06 NOTE — ANESTHESIA POSTPROCEDURE EVALUATION
Anesthesia POST Procedure Evaluation    Patient: Negrita Robledo   MRN:     [id number] Gender:   female   Age:    48 year old :      1970        Preoperative Diagnosis: Degenerative Spondylolisthesis Primary, Spinal Stenosis Lumbar Region with Neurogenic Claudication   Procedure(s):  Lumbar 4-5, L5-S1 Transforaminal Lumbar Interbody Fusion (Decompression and Instrumented Fusion) Lumbar 4-5, L5-S1 Smith Gramajo Osteotomy, Right Iliac Crest Autograft  Iliac Crest Autograft   Postop Comments: No value filed.       Anesthesia Type:  General    Reportable Event: NO     PAIN: Uncomplicated   Sign Out status: Comfortable, Well controlled pain     PONV: No PONV   Sign Out status:  No Nausea or Vomiting     Neuro/Psych: Uneventful perioperative course   Sign Out Status: Preoperative baseline; Age appropriate mentation     Airway/Resp.: Uneventful perioperative course   Sign Out Status: Non labored breathing, age appropriate RR; Resp. Status within EXPECTED Parameters     CV: Uneventful perioperative course   Sign Out status: Appropriate BP and perfusion indices; Appropriate HR/Rhythm     Disposition:   Sign Out in:  PACU  Disposition:  Phase II; Home  Recovery Course: Uneventful  Follow-Up: Not required           Last Anesthesia Record Vitals:  CRNA VITALS  3/6/2019 1048 - 3/6/2019 1148      3/6/2019             Resp Rate (observed):  16          Last PACU/Preop Vitals:  Vitals:    19 1430 19 1431 19 1456   BP: (!) 80/45 100/55 (!) 82/43   Pulse: 69 73 64   Resp: 12  13   Temp:   35.9  C (96.6  F)   SpO2: 99% 98% 98%         Electronically Signed By: Shaun Ahmadi MD, 2019, 4:05 PM

## 2019-03-06 NOTE — ANESTHESIA CARE TRANSFER NOTE
Patient: Negrita Robledo    Procedure(s):  Lumbar 4-5, L5-S1 Transforaminal Lumbar Interbody Fusion (Decompression and Instrumented Fusion) Lumbar 4-5, L5-S1 Smith Gramajo Osteotomy, Right Iliac Crest Autograft  Iliac Crest Autograft    Diagnosis: Degenerative Spondylolisthesis Primary, Spinal Stenosis Lumbar Region with Neurogenic Claudication  Diagnosis Additional Information: No value filed.    Anesthesia Type:   No value filed.     Note:  Airway :Face Mask  Patient transferred to:PACU  Handoff Report: Identifed the Patient, Identified the Reponsible Provider, Reviewed the pertinent medical history, Discussed the surgical course, Reviewed Intra-OP anesthesia mangement and issues during anesthesia, Set expectations for post-procedure period and Allowed opportunity for questions and acknowledgement of understanding      Vitals: (Last set prior to Anesthesia Care Transfer)    CRNA VITALS  3/6/2019 1048 - 3/6/2019 1123      3/6/2019             Resp Rate (observed):  16                Electronically Signed By: Emily Baig CRNA, DEVIN SAHA  March 6, 2019  11:23 AM

## 2019-03-06 NOTE — BRIEF OP NOTE
Orthopaedic Brief Operative Note 3/6/2019 11:40 AM    Patient: Negrita Robledo  MRN: [id number]       Pre-operative diagnosis: Degenerative Spondylolisthesis Primary, Spinal Stenosis Lumbar Region with Neurogenic Claudication   Post-operative diagnosis: Same   Procedure: Procedure(s):  Lumbar 4-5, L5-S1 Transforaminal Lumbar Interbody Fusion (Decompression and Instrumented Fusion) Lumbar 4-5, L5-S1 Smith Gramajo Osteotomy, Right Iliac Crest Autograft  Iliac Crest Autograft     Surgeon:  Co-surgeon:  Resident: Santy Goyal*  None  None available   Assistant(s): Mark Byers PA-C     Anesthesia: General   Estimated blood loss: 575 ml, 250 ml returned via cell saver   Total IV fluids: (See anesthesia record)   Total urine output: (See anesthesia record)   Blood transfusion No transfusion was given during surgery   Drains: left Hemovac  Jamal-Maradiaga   Specimens: None   Implants: See dictated operative report for full details   Grafts: Autologous/allogenic bone   Findings: See dictated operative report for full details   Complications: None   Disposition: Stable and extubated to PACU     Plan:    Pain: Scheduled Tylenol, Dilaudid PCA, hydrocodone PRN, Valium PRN  Activity: Weight bearing as tolerated. No lifting >10 lbs. No excessive twisting or bending. Start physical therapy POD#1.  Wound care: Dressing change as needed per Ortho  Jauregui out on POD #1  Drains: Document output per shift  Brace:  None  Abx: Ancef/clindamycin x 24 hours post op for surgical ppx  Diet: Clear liquids and ADAT  DVT ppx: Mechanical only  Radiographs: Upright AP and lateral radiographs of the lumbar spine prior to discharge.  Please include from L1 to femoral heads.  If cannot be included because of patient size, please do full-spine x-rays instead.  Disposition: Admit to Ortho Spine. Expect d/c in 3 days.         Mahamed Byers PA-C  Pager: [phone]    If no answer or after 4pm, please page the on call ortho  resident.

## 2019-03-06 NOTE — ANESTHESIA PREPROCEDURE EVALUATION
Anesthesia Pre-Procedure Evaluation    Patient: Negrita Robledo   MRN:     [id number] Gender:   female   Age:    48 year old :      1970        Preoperative Diagnosis: Degenerative Spondylolisthesis Primary, Spinal Stenosis Lumbar Region with Neurogenic Claudication   Procedure(s):  Lumbar 4-5 Transforaminal Lumbar Interbody Fusion (Decompression and Instrumented Fusion) Use Of O-Arm/Steath, Lumbar 4-5 Bowden Gramajo Osteotomy  Iliac Crest Autograft     Past Medical History:   Diagnosis Date     Allergic rhinitis      Anxiety      PONV (postoperative nausea and vomiting)       Past Surgical History:   Procedure Laterality Date     TONSILLECTOMY & ADENOIDECTOMY       TUBAL LIGATION       TYMPANOSTOMY, LOCAL/TOPICAL ANESTHESIA            Anesthesia Evaluation     . Pt has had prior anesthetic.     History of anesthetic complications   - PONV        ROS/MED HX    ENT/Pulmonary:  - neg pulmonary ROS     Neurologic:  - neg neurologic ROS     Cardiovascular:  - neg cardiovascular ROS       METS/Exercise Tolerance: Comment: Limited by pain 3 - Able to walk 1-2 blocks without stopping   Hematologic:  - neg hematologic  ROS       Musculoskeletal:   (+) arthritis, , , -       GI/Hepatic:  - neg GI/hepatic ROS       Renal/Genitourinary:  - ROS Renal section negative       Endo:  - neg endo ROS       Psychiatric:  - neg psychiatric ROS       Infectious Disease:  - neg infectious disease ROS       Malignancy:      - no malignancy   Other:    - neg other ROS                     PHYSICAL EXAM:   Mental Status/Neuro: A/A/O   Airway: Facies: Feasible  Mallampati: II  Mouth/Opening: Full  TM distance: > 6 cm  Neck ROM: Full   Respiratory: Auscultation: CTAB     Resp. Rate: Normal     Resp. Effort: Normal      CV: Rhythm: Regular  Rate: Age appropriate  Heart: Normal Sounds   Comments:      Dental: Normal                  Lab Results   Component Value Date    WBC 9.4 2019    HGB 13.7 2019    HCT 40.4 2019  "    01/04/2019     01/04/2019    POTASSIUM 4.2 01/04/2019    CHLORIDE 106 01/04/2019    CO2 24 01/04/2019    BUN 14 01/04/2019    CR 0.67 01/04/2019    GLC 98 03/06/2019    SANDRA 8.8 01/04/2019    HCG Negative 03/06/2019       Preop Vitals  BP Readings from Last 3 Encounters:   03/06/19 104/68   02/15/19 114/61   01/04/19 110/63    Pulse Readings from Last 3 Encounters:   02/15/19 60   01/04/19 53      Resp Readings from Last 3 Encounters:   03/06/19 16    SpO2 Readings from Last 3 Encounters:   03/06/19 97%   02/15/19 97%   01/04/19 97%      Temp Readings from Last 1 Encounters:   03/06/19 36.8  C (98.2  F) (Oral)    Ht Readings from Last 1 Encounters:   02/15/19 1.702 m (5' 7\")      Wt Readings from Last 1 Encounters:   02/15/19 86.4 kg (190 lb 8 oz)    Estimated body mass index is 29.84 kg/m  as calculated from the following:    Height as of 2/15/19: 1.702 m (5' 7\").    Weight as of 2/15/19: 86.4 kg (190 lb 8 oz).     LDA:            Assessment:   ASA SCORE: 2    NPO Status: > 6 hours since completed Solid Foods   Documentation: H&P complete; Preop Testing complete; Consents complete   Proceeding: Proceed without further delay  Tobacco Use:  NO Active use of Tobacco/UNKNOWN Tobacco use status     Plan:   Anes. Type:  General   Pre-Induction: Midazolam IV   Induction:  IV (Standard)   Airway: Oral ETT   Access/Monitoring: PIV; 2nd PIV   Maintenance: Balanced   Emergence: Procedure Site   Logistics: Same Day Surgery     Postop Pain/Sedation Strategy:  Standard-Options: Opioids PRN     PONV Management:  Adult Risk Factors: Female, H/o PONV or Motion Sickness, Non-Smoker, Postop Opioids  Prevention: Ondansetron; Dexamethasone; Propofol Infusion; Scop. Patch     CONSENT: Direct conversation   Plan and risks discussed with: Patient   Blood Products: Consented (ALL Blood Products)       Comments for Plan/Consent:  GETA, Standard ASA monitoring  All available and pertinent medical records and test results " reviewed.  Risks, including but not limited to airway injury, bronchospasm,  hypoxemia, PONV, need for blood transfusion d/w patient                         Shaun Ahmadi MD

## 2019-03-06 NOTE — OR NURSING
On arrival patient had red stripe on left cheek from used to secure breathing tube. Had 2 round red circles on forehead, now has faded away. Has red spot on right chest by shoulder socket, area starting to fade away. Some petechiae over both breasts.

## 2019-03-06 NOTE — OR NURSING
Patient given 2 doses of Fentanyl by CRNA and breathing became slow and shallow and BP dropped into 80's. Dr. Ahmadi notified. Given IV Bolus of 500 ml. BP now in 90's. Patient crying with pain. Started on IV Dilaudid. Will continue to Monitor. Care transferred to Radha Saha RN

## 2019-03-06 NOTE — OR NURSING
PACU to Inpatient Nursing Handoff    Patient Negrita Robledo is a 48 year old female who speaks English.   Procedure Procedure(s):  Lumbar 4-5, L5-S1 Transforaminal Lumbar Interbody Fusion (Decompression and Instrumented Fusion) Lumbar 4-5, L5-S1 Smith Gramajo Osteotomy, Right Iliac Crest Autograft  Iliac Crest Autograft   Surgeon(s) Primary: Santy Goyal MD  Assisting: Mark Byers PA-C     Not on File    Isolation  No active isolations     Past Medical History   has a past medical history of Allergic rhinitis, Anxiety, and PONV (postoperative nausea and vomiting).    Anesthesia General   Dermatome Level     Preop Meds scopolamine patch   Nerve block Not applicable   Intraop Meds dexamethasone (Decadron)  fentanyl (Sublimaze): 300 mcg total  ketamine (Ketalar): 50 mg given  ondansetron (Zofran): last given at 1049   Local Meds No   Antibiotics cefazolin (Ancef) - last given at 0920     Pain Patient Currently in Pain: yes  Comfort: intolerable  Pain Control: inadequate pain control   PACU meds  fentanyl (Sublimaze): 250 mcg (total dose) last given at 1320   hydromorphone (Dilaudid): 1.7 mg (total dose) last given at 1408   iv valium 2.5 at 1300   PCA / epidural Yes. PCA - hydromorphone (Dilaudid)   Capnography     Telemetry ECG Rhythm: Normal sinus rhythm   Inpatient Telemetry Monitor Ordered? No        Labs Glucose Lab Results   Component Value Date    GLC 98 03/06/2019       Hgb Lab Results   Component Value Date    HGB 13.7 03/06/2019       INR No results found for: INR   PACU Imaging Not applicable     Wound/Incision Incision/Surgical Site 03/06/19 Back (Active)   Incision Assessment WDL 3/6/2019 12:30 PM   Closure Liquid bandage 3/6/2019 10:56 AM   Incision Drainage Amount None 3/6/2019 12:30 PM   Dressing Intervention Clean, dry, intact 3/6/2019 12:30 PM   Number of days: 0      CMS        Equipment ice pack   Other LDA       IV Access Peripheral IV 03/06/19 Left Hand (Active)    Site Assessment [facility] 3/6/2019 12:30 PM   Line Status Infusing 3/6/2019 12:30 PM   Phlebitis Scale 0-->no symptoms 3/6/2019 12:30 PM   Infiltration Scale 0 3/6/2019  6:12 AM   Dressing Intervention New dressing  3/6/2019  6:12 AM   Number of days: 0       Peripheral IV 03/06/19 Right Lower forearm (Active)   Site Assessment [facility] 3/6/2019 12:00 PM   Line Status Saline locked 3/6/2019 12:00 PM   Number of days: 0      Blood Products Cell saver    mL   Intake/Output Date 03/06/19 0700 - 03/07/19 0659   Shift 5812-9806 5246-0765 4732-4377 24 Hour Total   INTAKE   I.V. 1900   1900   Other 220   220   Shift Total 2120   2120   OUTPUT   Urine 270   270   Drains 115   115   Blood 575   575   Shift Total 960   960   Weight (kg)          Drains / [place]gui Closed/Suction Drain 1 Midline Back Bulb 15 Ukrainian (Active)   Site Description [facility] 3/6/2019 12:30 PM   Dressing Status Normal: Clean, Dry & Intact 3/6/2019 12:30 PM   Drainage Appearance Bloody/Bright Red 3/6/2019 12:30 PM   Status To bulb suction 3/6/2019 12:30 PM   Output (ml) 50 ml 3/6/2019 12:42 PM   Number of days: 0       Closed/Suction Drain 2 Midline Back Accordion 10 Ukrainian (Active)   Site Description [facility] 3/6/2019 12:30 PM   Dressing Status Normal: Clean, Dry & Intact 3/6/2019 12:30 PM   Drainage Appearance Bloody/Bright Red 3/6/2019 12:30 PM   Number of days: 0       Urethral Catheter Latex;Straight-tip 16 fr (Active)   Collection Container Standard 3/6/2019 12:30 PM   Securement Method Securing device (Describe) 3/6/2019 12:30 PM   Rationale for Continued Use Anesthesia 3/6/2019 12:30 PM   Number of days: 0      Time of void PreOp Void Prior to Procedure: 0536 (03/06/19 0626)    PostOp      Diapered? No   Bladder Scan     PO    ice chips     Vitals    B/P: 92/59  T: 98.4  F (36.9  C)    Temp src: Axillary  P:  Pulse: 61 (03/06/19 1300)    Heart Rate: 66 (03/06/19 1300)     R: 9  O2:  SpO2: 99 %    O2 Device: None (Room air) (03/06/19 1300)    Oxygen Delivery: 8  LPM (03/06/19 1200)         Family/support present none   Patient belongings     Patient transported on bed and cart   DC meds/scripts (obs/outpt) Not applicable   Inpatient Pain Meds Released? Yes       Special needs/considerations None   Tasks needing completion None       Radha Stovall, RN  ASCOM [id number]

## 2019-03-06 NOTE — PLAN OF CARE
Pt up to unit with low sBP- low 80's. Alert and oriented, talkative, asymptomatic. Pt crying and wanting Valium but sBP continues to be low, will hold off until BP improves. Dilaudid PCA- pt waits and pushes button every 10 minutes. CMS intact. Lungs CTA, BS+. Tolerating oral fluids. Will inform Ortho of BP and will continue to monitor. Will re-evaluate vitals and administer muscle relaxant if vitals improves.

## 2019-03-07 ENCOUNTER — APPOINTMENT (OUTPATIENT)
Dept: PHYSICAL THERAPY | Facility: CLINIC | Age: 49
End: 2019-03-07
Attending: ORTHOPAEDIC SURGERY
Payer: COMMERCIAL

## 2019-03-07 ENCOUNTER — APPOINTMENT (OUTPATIENT)
Dept: OCCUPATIONAL THERAPY | Facility: CLINIC | Age: 49
End: 2019-03-07
Attending: ORTHOPAEDIC SURGERY
Payer: COMMERCIAL

## 2019-03-07 LAB
ANION GAP SERPL CALCULATED.3IONS-SCNC: 9 MMOL/L (ref 3–14)
BUN SERPL-MCNC: 8 MG/DL (ref 7–30)
CALCIUM SERPL-MCNC: 8.2 MG/DL (ref 8.5–10.1)
CHLORIDE SERPL-SCNC: 107 MMOL/L (ref 94–109)
CO2 SERPL-SCNC: 25 MMOL/L (ref 20–32)
CREAT SERPL-MCNC: 0.68 MG/DL (ref 0.52–1.04)
ERYTHROCYTE [DISTWIDTH] IN BLOOD BY AUTOMATED COUNT: 13 % (ref 10–15)
GFR SERPL CREATININE-BSD FRML MDRD: >90 ML/MIN/{1.73_M2}
GLUCOSE SERPL-MCNC: 134 MG/DL (ref 70–99)
HCT VFR BLD AUTO: 32.4 % (ref 35–47)
HGB BLD-MCNC: 10.5 G/DL (ref 11.7–15.7)
LACTATE BLD-SCNC: 2.6 MMOL/L (ref 0.7–2)
LACTATE BLD-SCNC: 2.9 MMOL/L (ref 0.7–2)
LACTATE BLD-SCNC: 2.9 MMOL/L (ref 0.7–2)
LACTATE BLD-SCNC: 3.5 MMOL/L (ref 0.7–2)
MCH RBC QN AUTO: 29.9 PG (ref 26.5–33)
MCHC RBC AUTO-ENTMCNC: 32.4 G/DL (ref 31.5–36.5)
MCV RBC AUTO: 92 FL (ref 78–100)
PLATELET # BLD AUTO: 179 10E9/L (ref 150–450)
POTASSIUM SERPL-SCNC: 4 MMOL/L (ref 3.4–5.3)
RBC # BLD AUTO: 3.51 10E12/L (ref 3.8–5.2)
SODIUM SERPL-SCNC: 141 MMOL/L (ref 133–144)
WBC # BLD AUTO: 19.4 10E9/L (ref 4–11)

## 2019-03-07 PROCEDURE — 25000128 H RX IP 250 OP 636: Performed by: INTERNAL MEDICINE

## 2019-03-07 PROCEDURE — 25000128 H RX IP 250 OP 636: Performed by: PEDIATRICS

## 2019-03-07 PROCEDURE — 25000132 ZZH RX MED GY IP 250 OP 250 PS 637: Performed by: PHYSICIAN ASSISTANT

## 2019-03-07 PROCEDURE — 97530 THERAPEUTIC ACTIVITIES: CPT | Mod: GP | Performed by: PHYSICAL THERAPIST

## 2019-03-07 PROCEDURE — 97535 SELF CARE MNGMENT TRAINING: CPT | Mod: GO | Performed by: OCCUPATIONAL THERAPIST

## 2019-03-07 PROCEDURE — 25000132 ZZH RX MED GY IP 250 OP 250 PS 637: Performed by: ORTHOPAEDIC SURGERY

## 2019-03-07 PROCEDURE — 12000001 ZZH R&B MED SURG/OB UMMC

## 2019-03-07 PROCEDURE — 25800030 ZZH RX IP 258 OP 636: Performed by: INTERNAL MEDICINE

## 2019-03-07 PROCEDURE — 25000128 H RX IP 250 OP 636: Performed by: PHYSICIAN ASSISTANT

## 2019-03-07 PROCEDURE — 99207 ZZC CDG-MDM COMPONENT: MEETS LOW - DOWN CODED: CPT | Performed by: INTERNAL MEDICINE

## 2019-03-07 PROCEDURE — 25000132 ZZH RX MED GY IP 250 OP 250 PS 637: Performed by: INTERNAL MEDICINE

## 2019-03-07 PROCEDURE — 80048 BASIC METABOLIC PNL TOTAL CA: CPT | Performed by: INTERNAL MEDICINE

## 2019-03-07 PROCEDURE — 83605 ASSAY OF LACTIC ACID: CPT | Performed by: INTERNAL MEDICINE

## 2019-03-07 PROCEDURE — 25000128 H RX IP 250 OP 636: Performed by: ORTHOPAEDIC SURGERY

## 2019-03-07 PROCEDURE — 97110 THERAPEUTIC EXERCISES: CPT | Mod: GP | Performed by: PHYSICAL THERAPIST

## 2019-03-07 PROCEDURE — 36415 COLL VENOUS BLD VENIPUNCTURE: CPT | Performed by: INTERNAL MEDICINE

## 2019-03-07 PROCEDURE — 97116 GAIT TRAINING THERAPY: CPT | Mod: GP | Performed by: PHYSICAL THERAPIST

## 2019-03-07 PROCEDURE — 97161 PT EVAL LOW COMPLEX 20 MIN: CPT | Mod: GP | Performed by: PHYSICAL THERAPIST

## 2019-03-07 PROCEDURE — 99232 SBSQ HOSP IP/OBS MODERATE 35: CPT | Performed by: INTERNAL MEDICINE

## 2019-03-07 PROCEDURE — 85027 COMPLETE CBC AUTOMATED: CPT | Performed by: INTERNAL MEDICINE

## 2019-03-07 PROCEDURE — 85018 HEMOGLOBIN: CPT | Performed by: PHYSICIAN ASSISTANT

## 2019-03-07 PROCEDURE — 97165 OT EVAL LOW COMPLEX 30 MIN: CPT | Mod: GO | Performed by: OCCUPATIONAL THERAPIST

## 2019-03-07 RX ORDER — DEXAMETHASONE SODIUM PHOSPHATE 10 MG/ML
10 INJECTION, SOLUTION INTRAMUSCULAR; INTRAVENOUS EVERY 6 HOURS
Status: DISCONTINUED | OUTPATIENT
Start: 2019-03-07 | End: 2019-03-08

## 2019-03-07 RX ORDER — HYDROMORPHONE HYDROCHLORIDE 1 MG/ML
.3-.5 INJECTION, SOLUTION INTRAMUSCULAR; INTRAVENOUS; SUBCUTANEOUS
Status: DISCONTINUED | OUTPATIENT
Start: 2019-03-07 | End: 2019-03-08

## 2019-03-07 RX ORDER — BISACODYL 10 MG
10 SUPPOSITORY, RECTAL RECTAL ONCE
Status: COMPLETED | OUTPATIENT
Start: 2019-03-07 | End: 2019-03-07

## 2019-03-07 RX ORDER — NALOXONE HYDROCHLORIDE 0.4 MG/ML
.1-.4 INJECTION, SOLUTION INTRAMUSCULAR; INTRAVENOUS; SUBCUTANEOUS
Status: DISCONTINUED | OUTPATIENT
Start: 2019-03-07 | End: 2019-03-10 | Stop reason: HOSPADM

## 2019-03-07 RX ORDER — POLYETHYLENE GLYCOL 3350 17 G/17G
17 POWDER, FOR SOLUTION ORAL 2 TIMES DAILY
Status: DISCONTINUED | OUTPATIENT
Start: 2019-03-07 | End: 2019-03-10 | Stop reason: HOSPADM

## 2019-03-07 RX ORDER — HYDROMORPHONE HYDROCHLORIDE 2 MG/1
2-4 TABLET ORAL
Status: DISCONTINUED | OUTPATIENT
Start: 2019-03-07 | End: 2019-03-10 | Stop reason: HOSPADM

## 2019-03-07 RX ORDER — HYDROMORPHONE HYDROCHLORIDE 2 MG/1
2-4 TABLET ORAL EVERY 4 HOURS PRN
Status: DISCONTINUED | OUTPATIENT
Start: 2019-03-07 | End: 2019-03-07

## 2019-03-07 RX ADMIN — HYDROMORPHONE HYDROCHLORIDE 2 MG: 2 TABLET ORAL at 12:05

## 2019-03-07 RX ADMIN — Medication 0.3 MG: at 14:26

## 2019-03-07 RX ADMIN — HYDROMORPHONE HYDROCHLORIDE 4 MG: 2 TABLET ORAL at 15:28

## 2019-03-07 RX ADMIN — GABAPENTIN 300 MG: 300 CAPSULE ORAL at 08:14

## 2019-03-07 RX ADMIN — SENNOSIDES AND DOCUSATE SODIUM 2 TABLET: 8.6; 5 TABLET ORAL at 21:38

## 2019-03-07 RX ADMIN — TRAZODONE HYDROCHLORIDE 50 MG: 50 TABLET ORAL at 21:41

## 2019-03-07 RX ADMIN — HYDROMORPHONE HYDROCHLORIDE 4 MG: 2 TABLET ORAL at 21:38

## 2019-03-07 RX ADMIN — SODIUM CHLORIDE, POTASSIUM CHLORIDE, SODIUM LACTATE AND CALCIUM CHLORIDE 500 ML: 600; 310; 30; 20 INJECTION, SOLUTION INTRAVENOUS at 08:20

## 2019-03-07 RX ADMIN — GABAPENTIN 900 MG: 300 CAPSULE ORAL at 21:38

## 2019-03-07 RX ADMIN — DEXAMETHASONE SODIUM PHOSPHATE 10 MG: 10 INJECTION, SOLUTION INTRAMUSCULAR; INTRAVENOUS at 01:06

## 2019-03-07 RX ADMIN — SODIUM CHLORIDE, POTASSIUM CHLORIDE, SODIUM LACTATE AND CALCIUM CHLORIDE: 600; 310; 30; 20 INJECTION, SOLUTION INTRAVENOUS at 12:10

## 2019-03-07 RX ADMIN — VENLAFAXINE HYDROCHLORIDE 225 MG: 150 TABLET, EXTENDED RELEASE ORAL at 08:14

## 2019-03-07 RX ADMIN — DEXAMETHASONE SODIUM PHOSPHATE 10 MG: 10 INJECTION, SOLUTION INTRAMUSCULAR; INTRAVENOUS at 12:59

## 2019-03-07 RX ADMIN — POLYETHYLENE GLYCOL 3350 17 G: 17 POWDER, FOR SOLUTION ORAL at 08:13

## 2019-03-07 RX ADMIN — HYDROMORPHONE HYDROCHLORIDE 4 MG: 2 TABLET ORAL at 18:51

## 2019-03-07 RX ADMIN — POLYETHYLENE GLYCOL 3350 17 G: 17 POWDER, FOR SOLUTION ORAL at 21:38

## 2019-03-07 RX ADMIN — SENNOSIDES AND DOCUSATE SODIUM 2 TABLET: 8.6; 5 TABLET ORAL at 08:14

## 2019-03-07 RX ADMIN — BISACODYL 10 MG: 10 SUPPOSITORY RECTAL at 21:42

## 2019-03-07 RX ADMIN — SODIUM CHLORIDE, POTASSIUM CHLORIDE, SODIUM LACTATE AND CALCIUM CHLORIDE 1000 ML: 600; 310; 30; 20 INJECTION, SOLUTION INTRAVENOUS at 00:36

## 2019-03-07 RX ADMIN — DEXAMETHASONE SODIUM PHOSPHATE 10 MG: 10 INJECTION, SOLUTION INTRAMUSCULAR; INTRAVENOUS at 18:51

## 2019-03-07 RX ADMIN — SODIUM CHLORIDE, POTASSIUM CHLORIDE, SODIUM LACTATE AND CALCIUM CHLORIDE 500 ML: 600; 310; 30; 20 INJECTION, SOLUTION INTRAVENOUS at 17:50

## 2019-03-07 RX ADMIN — SODIUM CHLORIDE, POTASSIUM CHLORIDE, SODIUM LACTATE AND CALCIUM CHLORIDE: 600; 310; 30; 20 INJECTION, SOLUTION INTRAVENOUS at 06:37

## 2019-03-07 RX ADMIN — ACETAMINOPHEN 650 MG: 325 TABLET, FILM COATED ORAL at 14:57

## 2019-03-07 RX ADMIN — HYDROMORPHONE HYDROCHLORIDE 2 MG: 2 TABLET ORAL at 13:08

## 2019-03-07 RX ADMIN — GABAPENTIN 300 MG: 300 CAPSULE ORAL at 12:05

## 2019-03-07 RX ADMIN — SODIUM CHLORIDE 1000 ML: 9 INJECTION, SOLUTION INTRAVENOUS at 22:08

## 2019-03-07 RX ADMIN — CEFAZOLIN 1 G: 1 INJECTION, POWDER, FOR SOLUTION INTRAMUSCULAR; INTRAVENOUS at 01:06

## 2019-03-07 RX ADMIN — SODIUM CHLORIDE, POTASSIUM CHLORIDE, SODIUM LACTATE AND CALCIUM CHLORIDE 500 ML: 600; 310; 30; 20 INJECTION, SOLUTION INTRAVENOUS at 15:38

## 2019-03-07 RX ADMIN — DEXAMETHASONE SODIUM PHOSPHATE 10 MG: 10 INJECTION, SOLUTION INTRAMUSCULAR; INTRAVENOUS at 08:05

## 2019-03-07 ASSESSMENT — ACTIVITIES OF DAILY LIVING (ADL)
ADLS_ACUITY_SCORE: 13
ADLS_ACUITY_SCORE: 17
ADLS_ACUITY_SCORE: 13
ADLS_ACUITY_SCORE: 17

## 2019-03-07 NOTE — PLAN OF CARE
VS:   VSS. T 96.9, RR12, HR62, /64. Blood pressure a little low, but not symptomatic. RR was low after taking valium but has gone up.   Output:   Had 1450 output this morning from her ken. In place and patent. No BM yet, has history of trouble with constipation. Giving full bowel regimen, walking a lot. Pt has been having lots of gas and a tall commode has been ordered, just arrived and is in room.. Supository has been ordered, but has not been given in order to wait for commode, and until pain is sightly better controlled.   Lungs Clear and equal bilaterally.   Activity:   Up to walk X3 today. Assist of 1.   Skin: WDL ex: incisions, drains.   Pain:   Pain was pretty well controlled this morning. Transitioned off the PCA and has gotten significantly worse. On oxy PRN Q4, IV dilaudid . 3 - . 5 mg Q2. Gave both and pt still rating pain 9/10. Planning to give oxy again at 1530 on the dot before leaving. Paged the doctor about possibly changing to Q3 oxy.   Neuro/CMS:   All WDL. Baseline numbness on upper thighs of legs. Educated patient about watching for change in numbness or [id number].   Dressing(s):   WDL ex. Minimal serosanguinous drainage on SARITA dressing   Diet:   Regular. Advised to start off the morning on clear liquid, prgerssed to regular @ lunch.   LDA:   Right PIV saline locked. Left PIV running /hr. SARITA in place and draining a fair amount. Hemovac in place, draining minimally.    Equipment:   Walker used when up to walk.   Plan:   Continue with plan of care. Control pain, work towards BM.   Additional Info:   Once pain is more well controlled, could think about supository. Lactic was up this morning, so the ken stayed in. Gave a 500 bolus of LR around 0900. Lactate went slightly down, but was back up at 1220. Doctor just ordered another 500 LR bolus. Given.

## 2019-03-07 NOTE — PROGRESS NOTES
03/07/19 1207   Quick Adds   Type of Visit Initial Occupational Therapy Evaluation   Living Environment   Lives With alone   Living Arrangements house  ([facility])   Home Accessibility no concerns   Transportation Anticipated car, drives self;family or friend will provide   Living Environment Comment Pt has a neighbor who is an RN and is planning to assist as needed   Self-Care   Usual Activity Tolerance moderate   Current Activity Tolerance fair   Regular Exercise No   Equipment Currently Used at Home raised toilet   Activity/Exercise/Self-Care Comment In prep for surgery pt obtained cane, toilet safety frame, LHSH, sock aide, and reacher   Functional Level   Ambulation 0-->independent   Transferring 0-->independent   Toileting 1-->assistive equipment   Bathing 0-->independent   Dressing 0-->independent   Eating 0-->independent   Communication 0-->understands/communicates without difficulty   Swallowing 0-->swallows foods/liquids without difficulty   Cognition 1 - attention or memory deficits  (pt reports increased STM deficits over past month)   Fall history within last six months no   Which of the above functional risks had a recent onset or change? ambulation;transferring;toileting;bathing;dressing;cognition   Prior Functional Level Comment Pt was (I) with all ADL/IADLs including driving and part time work at [employer]   General Information   Onset of Illness/Injury or Date of Surgery - Date 03/06/19   Referring Physician Mark Byers PA-C   Additional Occupational Profile Info/Pertinent History of Current Problem Pt is a 48 year old female s/p L4-S1 TLIF, L4-S1 SPO and Right iliac crest autograft on 3/6/2019 with Dr. Goyal   Precautions/Limitations spinal precautions   Weight-Bearing Status - LUE (10# lifting/pushing/pulling restriction)   Weight-Bearing Status - RUE (10# lifting/pushing/pulling restriction)   Weight-Bearing Status - LLE full weight-bearing   Weight-Bearing Status - RLE  full weight-bearing   Cognitive Status Examination   Orientation orientation to person, place and time   Level of Consciousness alert   Follows Commands (Cognition) WNL   Memory impaired   Cognitive Comment Pt reports STM deficits over past month - has been mildly impacting work performance; will monitor and further assess as indicated   Visual Perception   Visual Perception Wears glasses  (all the time)   Visual Perception Comments Pt reports worsening vision over past few months - encouraged her to speak with MDs regarding this concern   Sensory Examination   Sensory Quick Adds Other (describe)   Sensory Comments Pt reports issues in BLEs due to sciatic nerve pain   Pain Assessment   Patient Currently in Pain Yes, see Vital Sign flowsheet   Range of Motion (ROM)   ROM Comment BUEs WFL   Strength   Strength Comments N/T due to precautions   Mobility   Bed Mobility Bed mobility skill: Sit to supine;Bed mobility skill: Supine to sit;Bed mobility skill: Scooting/Bridging;Bed mobility skill: Rolling/Turning   Bed Mobility Skill: Rolling/Turning   Level of Oliver - Bed Mobility Skill Rolling Turning stand-by assist   Physical Assist/Nonphysical Assist verbal cues;1 person assist   Bed Mobility Skill: Sit to Supine   Level of Oliver: Sit/Supine minimum assist (75% patients effort)   Physical Assist/Nonphysical Assist: Sit/Supine verbal cues;1 person assist   Bed Mobility Skill: Supine to Sit   Level of Oliver: Supine/Sit contact guard   Physical Assist/Nonphysical Assist: Supine/Sit verbal cues;1 person assist   Transfer Skill: Sit to Stand   Level of Oliver: Sit/Stand stand-by assist   Physical Assist/Nonphysical Assist: Sit/Stand verbal cues;1 person assist   Lower Body Dressing   Level of Oliver: Dress Lower Body unable to perform   Grooming   Level of Oliver: Grooming stand-by assist   Physical Assist/Nonphysical Assist: Grooming verbal cues;1 person assist   Activities of Daily  "Living Analysis   Impairments Contributing to Impaired Activities of Daily Living balance impaired;cognition impaired;pain;post surgical precautions;sensation decreased;sensory feedback impaired;strength decreased   General Therapy Interventions   Planned Therapy Interventions ADL retraining;IADL retraining;cognition;bed mobility training;ROM;strengthening;stretching;transfer training;home program guidelines;progressive activity/exercise   Clinical Impression   Criteria for Skilled Therapeutic Interventions Met yes, treatment indicated   OT Diagnosis decreased ADL/IADL (I)   Influenced by the following impairments pain, surgical precautions, impaired balance, weakness   Assessment of Occupational Performance 5 or more Performance Deficits   Identified Performance Deficits functional transfers, toileting, bathing, dressing, household chores, work, driving   Clinical Decision Making (Complexity) Low complexity   Therapy Frequency daily   Predicted Duration of Therapy Intervention (days/wks) 3-5 days   Anticipated Equipment Needs at Discharge shower chair   Anticipated Discharge Disposition Home with Assist;Home with Home Therapy;Home with Outpatient Therapy   Risks and Benefits of Treatment have been explained. Yes   Patient, Family & other staff in agreement with plan of care Yes   [facility] TM \"6 Clicks\"   2016, Trustees of [facility], under license to [employer].  All rights reserved.   6 Clicks Short Forms Daily Activity Inpatient Short Form   [facility]  \"6 Clicks\" Daily Activity Inpatient Short Form   1. Putting on and taking off regular lower body clothing? 2 - A Lot   2. Bathing (including washing, rinsing, drying)? 2 - A Lot   3. Toileting, which includes using toilet, bedpan or urinal? 2 - A Lot   4. Putting on and taking off regular upper body clothing? 3 - A Little   5. Taking care of personal grooming such as brushing teeth? 3 - A Little   6. Eating meals? 4 - None "   Daily Activity Raw Score (Score out of 24.Lower scores equate to lower levels of function) 16   Total Evaluation Time   Total Evaluation Time (Minutes) 8

## 2019-03-07 NOTE — PROGRESS NOTES
03/07/19 0838   Quick Adds   Type of Visit Initial PT Evaluation       Present no   Language English   Living Environment   Lives With alone   Living Arrangements house   Home Accessibility no concerns   Transportation Anticipated family or friend will provide   Living Environment Comment Pt lives alone in a house with no stairs. Has a neighbor that can assist as needed.   Self-Care   Usual Activity Tolerance moderate   Current Activity Tolerance fair   Regular Exercise No   Equipment Currently Used at Home none   Activity/Exercise/Self-Care Comment Pt reports significantly limited activity levels prior to surgery secondary to pain. Has a cane at home.   Functional Level Prior   Ambulation 0-->independent   Transferring 0-->independent   Toileting 0-->independent   Bathing 0-->independent   Communication 0-->understands/communicates without difficulty   Swallowing 0-->swallows foods/liquids without difficulty   Cognition 0 - no cognition issues reported   Fall history within last six months no   Which of the above functional risks had a recent onset or change? ambulation;transferring   Prior Functional Level Comment Pt was independent with ADLs prior to surgery.   General Information   Onset of Illness/Injury or Date of Surgery - Date 03/06/19   Referring Physician Santy Goyal MD   Patient/Family Goals Statement Pt did not verbalize any goals.   Pertinent History of Current Problem (include personal factors and/or comorbidities that impact the POC) Pt is a 49 yo female s/p L4-S1 TLIF, L4-S1 SPO and Right iliac crest autograft, with no significant comorbidities that impact PT POC.   Precautions/Limitations fall precautions;spinal precautions   Weight-Bearing Status - LLE weight-bearing as tolerated   Weight-Bearing Status - RLE weight-bearing as tolerated   General Observations Pt has PCA, pulse ox, SARITA drain, hemovac, ken, and is getting IV fluids.   Cognitive Status  Examination   Orientation other (see comments)  (not tested)   Level of Consciousness alert   Follows Commands and Answers Questions 100% of the time   Personal Safety and Judgment intact   Memory intact   Pain Assessment   Patient Currently in Pain Yes, see Vital Sign flowsheet   Integumentary/Edema   Integumentary/Edema Comments Incision not observed d/t post op dressing in place.   Posture    Posture Forward head position;Protracted shoulders   Range of Motion (ROM)   ROM Comment LE ROM WFL. Spinal ROM limited secondary to pain and post op precautions.   Strength   Strength Comments Pt demonstrated functional strength with transfers and gait.   Bed Mobility   Bed Mobility Comments Pt able to log roll independently. Supine to/from sitting with SBA.   Transfer Skills   Transfer Comments Sit to/from standing with FWW and CGA.    Gait   Gait Comments Pt ambulated 400' with FWW and CGA.   Balance   Balance Comments Pt demonstrated good balance sitting and with FWW.   Sensory Examination   Sensory Perception Comments Pt reports mild numbness/tingling in bilateral posterior legs.   General Therapy Interventions   Planned Therapy Interventions bed mobility training;gait training;strengthening;transfer training   Clinical Impression   Criteria for Skilled Therapeutic Intervention yes, treatment indicated   PT Diagnosis imapired mobility, decreased functional strength   Influenced by the following impairments pain, decreased ROM, decreased strength   Functional limitations due to impairments impaired bed mobility, transfers, and gait   Clinical Presentation Stable/Uncomplicated   Clinical Presentation Rationale Pt is a 47 yo female s/p L4-S1 TLIF, L4-S1 SPO and Right iliac crest autograft, with no significant comorbidities that impact PT POC.   Clinical Decision Making (Complexity) Low complexity   Therapy Frequency` 2 times/day   Predicted Duration of Therapy Intervention (days/wks) 3 days   Anticipated Equipment Needs at  "Discharge front wheeled walker   Anticipated Discharge Disposition Home with Assist   Risk & Benefits of therapy have been explained Yes   Patient, Family & other staff in agreement with plan of care Yes   [facility] TM \"6 Clicks\"   2016, Trustees of [facility], under license to [employer].  All rights reserved.   6 Clicks Short Forms Basic Mobility Inpatient Short Form   [facility] AM-PAC  \"6 Clicks\" V.2 Basic Mobility Inpatient Short Form   1. Turning from your back to your side while in a flat bed without using bedrails? 4 - None   2. Moving from lying on your back to sitting on the side of a flat bed without using bedrails? 4 - None   3. Moving to and from a bed to a chair (including a wheelchair)? 4 - None   4. Standing up from a chair using your arms (e.g., wheelchair, or bedside chair)? 4 - None   5. To walk in hospital room? 4 - None   6. Climbing 3-5 steps with a railing? 3 - A Little   Basic Mobility Raw Score (Score out of 24.Lower scores equate to lower levels of function) 23   Total Evaluation Time   Total Evaluation Time (Minutes) 10     "

## 2019-03-07 NOTE — CONSULTS
HOSPITALIST CONSULTATION     REQUESTING PHYSICIAN: Santy Goyal MD    REASON FOR CONSULTATION: Evaluation, Recommendations and co management of Medical Comorbidities.     ASSESSMENT & PLAN :     Negrita Robledo is a 48 year old female admitted on 3/6/2019 following procedure, s/p following procedure: by Dr Goyal.      Pre-operative diagnosis: Degenerative Spondylolisthesis Primary, Spinal Stenosis Lumbar Region with Neurogenic Claudication   Post-operative diagnosis: Same   Procedure: Procedure(s):  Lumbar 4-5, L5-S1 Transforaminal Lumbar Interbody Fusion (Decompression and Instrumented Fusion) Lumbar 4-5, L5-S1 Smith Gramajo Osteotomy, Right Iliac Crest Autograft  Iliac Crest Autograft      Anesthesia: General   Estimated blood loss: 575 ml, 250 ml returned via cell saver   Blood transfusion No transfusion was given during surgery   D[facility]       Post procedure (PACU): Patient with low blood pressure, lactic acidosis -however clinically asymptomatic.  States usually runs a soft blood pressure in low 100 at baseline.  No fever or chills.  Remains alert interactive.  Patient with increased back pain and numbness over right lower extremity.  Evaluated by orthopedic.  CT and MR lumbar spine done.  No epidural hematoma.  Dr. Goyal: Spinal Cannell patent , no severe foraminal stenosis or compressive lesions, no obvious fracture or complication.      #Hemodynamics:  -Postop hypotension: Systolic blood pressure mostly 80s/diastolic 40s-50s.  Heart rate controlled.  Breathing fine, oxygenating high 90s on 3 L nasal cannula.    Lactic acidosis-3.4.   Patient appears nonseptic.   Suspect likely hypovolemia.  follow-up hemoglobin 10.9.  Chemistry unremarkable.     -Patient's blood pressure improved after fluid bolus (1 L Ringer's lactate bolus-> 200/hr), with good urine output.   -However lactic acid still remains elevated at 3.5.  Will give  another liter of Ringer's lactate and follow-up level.   -Continue to monitor vitals closely, telemetry monitor, strict I's and O's,   -[facility] status for tonight  -Monitor for evidence of sepsis, bleeding.  -Judicious use of narcotics, sedatives.       #Medical history:  Past medical history and preop evaluation reviewed.  Denies significant cardiopulmonary, blood clot or bleeding or kidney etc history.    Anxiety disorder: Takes propanolol.  Hold for now given low blood pressure.  Resume as tolerated.  Continue home venlafaxine.      # Orthopedics:  POD # 0    Anemia of acute blood loss: Monitor hgb . Transfuse for hgb <7.0    Analgesia: Acute on chronic: Per Orthopedic team.   DVT prophylaxis:- Per orthopedic team  Activity per orthopedic team.   Antibiotics and wound care as per primary team.     Encourage Incentive spirometry to prevent atelectasis   Minimize use of narcotics as able. Consider bowel regimen with narcotics.       Thank you for letting us get involved in care of Ms Robledo.   Please page with any questions.      Bob Jackson MD  House Physician  Pager: [phone]    3/6/2019        CHIEF COMPLAINT: Incisional pain.     HISTORY OF PRESENT ILLNESS: Obtained from the patient and chart review including Pre op evaluation, procedure note.    48 year old year old female  with above discussed medical problems s/p above procedure admitted on 3/6/2019  for post op care and monitoring  (for further details for indication of surgery and operative note, please refer to Santy Goyal MD note). Medicine consulted to evaluate, recommend and/or co manage medical co morbidities.   No documented hypotension, hypoxemia or other significant complications intra or post operative.   Currently: Incisional Pain +. Denies any chest pain, shortness of breath or LH or palpitations. Denies any nausea, vomiting or pain abdomen. No fever or chills. Denies any dysuria.  Denies any new rash. Sciatica R LE   Medical  issues as discussed above.   Denies any other medical concern.     PAST MEDICAL HISTORY:   Past Medical History:   Diagnosis Date     Allergic rhinitis      Anxiety      PONV (postoperative nausea and vomiting)        PAST SURGICAL HISTORY:   Past Surgical History:   Procedure Laterality Date     TONSILLECTOMY & ADENOIDECTOMY       TUBAL LIGATION       TYMPANOSTOMY, LOCAL/TOPICAL ANESTHESIA         FH: reviewed.     Family History   Problem Relation Age of Onset     Anxiety Disorder Mother      Hyperlipidemia Mother      Depression Mother      Unknown/Adopted Father      Cancer Paternal Grandmother         unknown type      No Known Problems Half-Brother      Cancer Paternal Grandfather         unknown type     Unknown/Adopted Maternal Grandmother      Unknown/Adopted Maternal Grandfather         SH: reviewed.     Social History     Socioeconomic History     Marital status: Single     Spouse name: None     Number of children: 0     Years of education: None     Highest education level: None   Occupational History     Occupation: pharmacy tech   Social Needs     Financial resource strain: None     Food insecurity:     Worry: None     Inability: None     Transportation needs:     Medical: None     Non-medical: None   Tobacco Use     Smoking status: Never Smoker     Smokeless tobacco: Never Used   Substance and Sexual Activity     Alcohol use: Yes     Frequency: Monthly or less     Comment: rare     Drug use: No     Sexual activity: None   Lifestyle     Physical activity:     Days per week: None     Minutes per session: None     Stress: None   Relationships     Social connections:     Talks on phone: None     Gets together: None     Attends [employer]ism service: None     Active member of club or organization: None     Attends meetings of clubs or organizations: None     Relationship status: None     Intimate partner violence:     Fear of current or ex partner: None     Emotionally abused: None     Physically abused: None      Forced sexual activity: None   Other Topics Concern     None   Social History Narrative     None       ALLERGIES:   Allergies   Allergen Reactions     Animal Dander      Runny nose         HOME MEDICATIONS:     Prior to Admission medications    Medication Sig Start Date End Date Taking? Authorizing Provider   Biotin 5000 MCG CAPS    Yes Reported, Patient   calcium carbonate (OS-SANDRA) 500 MG tablet Take 1 tablet (500 mg) by mouth daily  Patient taking differently: Take 1 tablet by mouth every morning  12/28/18  Yes Santy Goyal MD   cholecalciferol (VITAMIN D3) 5000 units (125 mcg) capsule Take 5,000 Units by mouth daily   Yes Unknown, Entered By History   Docosahexaenoic Acid (DHA OMEGA 3 PO) Take 1 capsule daily in the morning     Contains 683 mg EPA and 252 mg DHA   Yes Unknown, Entered By History   gabapentin (NEURONTIN) 300 MG capsule Take 900 mg by mouth At Bedtime  1/4/19  Yes Della Baig APRN CNP   Misc Natural Products (GLUCOSAMINE CHONDROITIN TRIPLE PO) Take 1 tablet by mouth daily   Yes Unknown, Entered By History   oxyCODONE-acetaminophen (PERCOCET) 5-325 MG tablet Take 1 tablet by mouth every 6 hours as needed for severe pain   Yes Reported, Patient   polyethylene glycol (MIRALAX/GLYCOLAX) packet Take 1 packet by mouth as needed for constipation (pt states takes miralax when taking pain medication)   Yes Reported, Patient   propranolol ER (INDERAL LA) 120 MG 24 hr capsule Take 1 capsule (120 mg) by mouth daily For anxiety management 1/4/19  Yes Della Baig APRN CNP   tiZANidine (ZANAFLEX) 2 MG tablet Take 4 mg by mouth At Bedtime  10/22/18  Yes Reported, Patient   traZODone (DESYREL) 50 MG tablet Take 50 mg by mouth At Bedtime  10/25/18  Yes Reported, Patient   venlafaxine (EFFEXOR-XR) 150 MG 24 hr capsule TAKE ONE CAPSULE BY MOUTH ONCE DAILY IN  THE  MORNING  TO  IMPROVE  MOODS  (take with 75 mg capsule for total daily dose of 225mg) 8/16/18  Yes Reported, Patient    venlafaxine (EFFEXOR-XR) 75 MG 24 hr capsule TAKE ONE CAPSULE BY MOUTH ONCE DAILY WITH MEALS FOR  MOOD  -  (Take with 150 mg capsule for total daily dose of 225 mg) 8/16/18  Yes Reported, Patient   fluticasone (FLONASE) 50 MCG/ACT nasal spray Spray 1 spray into both nostrils daily as needed  9/19/17   Reported, Patient   ibuprofen (ADVIL/MOTRIN) 800 MG tablet Take 800 mg by mouth every 8 hours as needed  6/5/18   Reported, Patient       CURRENT MEDICATIONS:    Current Facility-Administered Medications   Medication     benzocaine-menthol (CEPACOL) 15-3.6 MG lozenge 1 lozenge     calcium carbonate (TUMS) chewable tablet 1,000 mg     ceFAZolin (ANCEF) 1 g vial to attach to  ml bag for ADULT or 50 ml bag for PEDS     dexamethasone (DECADRON) injection 10 mg     diazepam (VALIUM) injection 5 mg     diazepam (VALIUM) tablet 5 mg     diphenhydrAMINE (BENADRYL) capsule 25 mg    Or     diphenhydrAMINE (BENADRYL) injection 25 mg     gabapentin (NEURONTIN) capsule 900 mg     HYDROcodone-acetaminophen (NORCO) 5-325 MG per tablet 1-2 tablet     HYDROmorphone (DILAUDID) PCA 1 mg/mL OPIOID NAIVE     lactated ringers BOLUS 1,000 mL     lactated ringers infusion     lidocaine (LMX4) cream     lidocaine 1 % 0.1-1 mL     metoclopramide (REGLAN) tablet 10 mg    Or     metoclopramide (REGLAN) injection 10 mg     naloxone (NARCAN) injection 0.1-0.4 mg     ondansetron (ZOFRAN) injection 4 mg     ondansetron (ZOFRAN-ODT) ODT tab 4 mg     oxyCODONE (ROXICODONE) tablet 10 mg     polyethylene glycol (MIRALAX/GLYCOLAX) Packet 17 g     prochlorperazine (COMPAZINE) injection 10 mg    Or     prochlorperazine (COMPAZINE) tablet 10 mg    Or     prochlorperazine (COMPAZINE) Suppository 25 mg     propranolol ER (INDERAL LA) 24 hr capsule 120 mg     scopolamine (TRANSDERM-SCOP) Patch in Place     scopolamine (TRANSDERM-SCOP) Patch in Place     [START ON 3/7/2019] scopolamine (TRANSDERM-SCOP) patch REMOVAL     [START ON 3/7/2019] scopolamine  (TRANSDERM-SCOP) patch REMOVAL     senna-docusate (SENOKOT-S/PERICOLACE) 8.6-50 MG per tablet 1 tablet    Or     senna-docusate (SENOKOT-S/PERICOLACE) 8.6-50 MG per tablet 2 tablet     sodium chloride (PF) 0.9% PF flush 3 mL     sodium chloride (PF) 0.9% PF flush 3 mL     traZODone (DESYREL) tablet 50 mg     [START ON 3/7/2019] venlafaxine (EFFEXOR-ER) 24 hr tablet 225 mg         ROS: 10 point ROS neg other than the symptoms noted above in the HPI.    PHYSICAL EXAMINATION:     BP (!) 86/50   Pulse 68   Temp 96.6  F (35.9  C) (Axillary)   Resp 12   LMP 2019 (Exact Date)   SpO2 99%   Temp (24hrs), Av  F (36.7  C), Min:96.6  F (35.9  C), Max:98.8  F (37.1  C)      BMI= There is no height or weight on file to calculate BMI.      Intake/Output Summary (Last 24 hours) at 3/6/2019 1825  Last data filed at 3/6/2019 1500  Gross per 24 hour   Intake 3680 ml   Output 1275 ml   Net 2405 ml       General: Alert, interactive, NAD.   HEENT: AT/NC. PERRLA. Anicteric.Moist MM.    Neck: Supple. No JVD. No Lymphadenopathy.  Heart/CVS: Normal S1 and S2. Regular. No m/r/g .   Chest/Respi: Non labored breathing. CTA BL.   Abdomen/GI: Soft, non distended, non tender. No g/r/r.   Extremities/MSK: Distal pulses 2+, well perfused. Rest per ortho.   Neuro: Alert and oriented x4. Cranial nerves II-XII are grossly intact.    Skin:  No new rash over exposed areas.       LABORATORY DATA: reviewed.     Recent Results (from the past 24 hour(s))   HCG qualitative urine    Collection Time: 19  5:36 AM   Result Value Ref Range    HCG Qual Urine Negative NEG^Negative   Hemoglobin    Collection Time: 19  6:16 AM   Result Value Ref Range    Hemoglobin 13.7 11.7 - 15.7 g/dL   Glucose    Collection Time: 19  6:16 AM   Result Value Ref Range    Glucose 98 70 - 99 mg/dL       Recent Results (from the past 24 hour(s))   XR Surgery ISIAH Fluoro L/T 5 Min    Narrative    This exam was marked as non-reportable because it will not  be read by a   radiologist or a [facility] non-radiologist provider.                     Bob Jackson MD

## 2019-03-07 NOTE — PLAN OF CARE
Discharge Planner PT   Patient plan for discharge: Home with assist as needed  Current status: PT evaluation completed. Pt educated on PT role, PT POC, and post op precautions. Pt able to log roll independently. Supine to sitting with SBA. Sit to/from standing with FWW and CGA. Pt required cues for safe hand placement with transfers. Pt educated on gait sequence with FWW. Pt ambulated 300' with FWW and close CGA. Pt reports legs feel moderately unsteady under her. No LOB observed.  Barriers to return to prior living situation: safety, independence  Recommendations for discharge: Home with assist as needed  Rationale for recommendations: Pt is demonstrating good mobility, will continue to monitor. Anticipate pt will be safe to discharge home pending continued progress.       Entered by: Nia Bowden 03/07/2019 3:21 PM

## 2019-03-07 NOTE — DISCHARGE SUMMARY
ORTHOPAEDIC DISCHARGE SUMMARY     Date of Admission: 3/6/2019  Date of Discharge: 3/10/2019 10:30 AM  Disposition: Home  Staff Physician: Santy Goyal*  Primary Care Provider: Reta Castaneda    DISCHARGE DIAGNOSIS:  Degenerative Spondylolisthesis Primary, Spinal Stenosis Lumbar Region with Neurogenic Claudication    PROCEDURES: Procedure(s):  Lumbar 4-5, L5-S1 Transforaminal Lumbar Interbody Fusion (Decompression and Instrumented Fusion) Lumbar 4-5, L5-S1 Smith Gramajo Osteotomy, Right Iliac Crest Autograft  Iliac Crest Autograft on 3/6/2019    BRIEF HISTORY:  Negrita Robledo is a 48-year-old female with a history of low back pain, and bilateral L5 radicular symptoms which claudicate with ambulation.  She reports that her symptoms are secondary to lifting a heavy bag of salt approximately 15 years ago.  She feels that her back pain, weakness, numbness/tingling has progressed over the subsequent years.  She finds herself tripping over her right foot, and describes difficulty clearing the ground with her toes when she walks.  Additionally, her numbness/tingling, and pain awaken her from sleep. She tried extensive conservative management without relief. Given her radiologic findings, and physical exam, Dr. Goyal discussed the indications for surgical intervention, as well as its risks, benefits, and alternatives.  Patient acknowledged understanding, and elected to proceed with surgical intervention.    HOSPITAL COURSE:    Surgery was uncomplicated. Negrita Robledo has done well post-operatively. Medicine was consulted post operatively to aid in management of medical comorbidities.  Patient had an elevated lactic acid in the perioperative period attributed to decreased volume which was treated with fluid boluses. See final recommendations below. The patient received routine nursing cares and is medically stable. Vital signs are stable. The patient is tolerating a regular diet without GI  distress/nausea or vomiting. Voiding spontaneously. All PT/OT goals have been met for safe mobility. Pain is now controlled on oral medications which will be available on discharge. Stool softeners have been used while taking pain medications to help prevent constipation. Negrita Robledo is deemed medically safe to discharge.     Antibiotics:  Ancef given periop and 24 hours postop.  DVT prophylaxis:  Ambulatory with SCD's  PT Progress:  Has met PT/OT goals for safe mobility.   Pain Meds:  Weaned off all IV pain meds by discharge.  Inpatient Events: No significant events or complications.       Patient had an elevated lactic acid in the perioperative period attributed to decreased volume which was treated with fluid boluses.     Discharge orders and instructions as below.    FOLLOWUP:    Future Appointments   Date Time Provider Department Center   3/8/2019  8:45 AM Laurita Alejandre, OT [facility] [place]   3/8/2019 11:30 AM Vale Pedro Pt, PT [facility] [place]   3/8/2019  2:30 PM Vale Pedro Pt, PT [facility] [place]   4/19/2019 11:00 AM Santy Goyal MD [facility]     Medicine Recommendations:  PLAN:  1)  Clinically stable for discharge home - neighbor, an RN to look in on her as well as her boyfriend.  2)  Meds, orders reviewed.  Same neurontin, opiates, tylenol.  Supp if 3 days and no BM.  3)  Suppository if 3 days and no BM Primary MD appointment later this week if able - review meds, check clinically, recheck BMP, CBC. Continue use of incentive spirometer.    Appointments on [facility]. Call [phone] if you haven't heard regarding these appointments within 7 days of discharge.    PLANNED DISCHARGE ORDERS:     DVT Prophylaxis: Ambulatory      Current Discharge Medication List      CONTINUE these medications which have NOT CHANGED    Details   Biotin 5000 MCG CAPS       calcium carbonate (OS-SANDRA) 500 MG tablet Take 1 tablet (500 mg) by mouth daily  Qty: 90  tablet, Refills: 1    Associated Diagnoses: Degenerative spondylolisthesis      cholecalciferol (VITAMIN D3) 5000 units (125 mcg) capsule Take 5,000 Units by mouth daily      Docosahexaenoic Acid (DHA OMEGA 3 PO) Take 1 capsule daily in the morning     Contains 683 mg EPA and 252 mg DHA      gabapentin (NEURONTIN) 300 MG capsule Take 900 mg by mouth At Bedtime       Misc Natural Products (GLUCOSAMINE CHONDROITIN TRIPLE PO) Take 1 tablet by mouth daily      oxyCODONE-acetaminophen (PERCOCET) 5-325 MG tablet Take 1 tablet by mouth every 6 hours as needed for severe pain      polyethylene glycol (MIRALAX/GLYCOLAX) packet Take 1 packet by mouth as needed for constipation (pt states takes miralax when taking pain medication)      propranolol ER (INDERAL LA) 120 MG 24 hr capsule Take 1 capsule (120 mg) by mouth daily For anxiety management      tiZANidine (ZANAFLEX) 2 MG tablet Take 4 mg by mouth At Bedtime       traZODone (DESYREL) 50 MG tablet Take 50 mg by mouth At Bedtime       !! venlafaxine (EFFEXOR-XR) 150 MG 24 hr capsule TAKE ONE CAPSULE BY MOUTH ONCE DAILY IN  THE  MORNING  TO  IMPROVE  MOODS  (take with 75 mg capsule for total daily dose of 225mg)      !! venlafaxine (EFFEXOR-XR) 75 MG 24 hr capsule TAKE ONE CAPSULE BY MOUTH ONCE DAILY WITH MEALS FOR  MOOD  -  (Take with 150 mg capsule for total daily dose of 225 mg)      fluticasone (FLONASE) 50 MCG/ACT nasal spray Spray 1 spray into both nostrils daily as needed       ibuprofen (ADVIL/MOTRIN) 800 MG tablet Take 800 mg by mouth every 8 hours as needed        !! - Potential duplicate medications found. Please discuss with provider.          No discharge procedures on file.    Shahnaz Cota MD  Orthopaedic Surgery, PGY-1  [phone]

## 2019-03-07 NOTE — PLAN OF CARE
VS:   BP 94/57 (BP Location: Right arm)   Pulse 60   Temp 95.7  F (35.4  C) (Oral)   Resp 12   LMP 02/03/2019 (Exact Date)   SpO2 100%      Output:   Jauregui catheter in place, LBM 3/5/19   Lungs Clear    Activity:   Asst 1    Skin: Intact, except Incision and drains Mid back   Pain:   Well controlled with PCA pump    Neuro/CMS:   A&O x 4. Cms intact and patient report Numbness is still there but resolving.    Dressing(s):   Mid back, CDI   Diet:   Clears    LDA:   Capno, PCD's PIV x2, L hand infusing and R hand SL, PCA, SARITA drain, Jauregui and Hemovac    Equipment:   IV pole, CAPNO, PCA   Plan:   Continue to monitor per POC   Additional Info:   Patient was up at bedside tonight and walked in the hallway.   Patient tripped Lactic acid on evening 3.5 and current labs show she is 2.9, Moonlighter paged.

## 2019-03-07 NOTE — PROGRESS NOTES
[facility] Internal Medicine Progress Note            Interval History:   Record reviewed including IM consult.  Seen with RN.   S/P Lumbar 4-5, L5-S1 Transforaminal Lumbar Interbody Fusion (Decompression and Instrumented Fusion) Lumbar 4-5, L5-S1 Smith Gramajo Osteotomy, Right Iliac Crest Autograft.  3/6.  Dr. Goyal.  .  250 ml cell saver.  Post op hypotension with lactic acid elevation 3.5 attributed to hypovolemia. 1 liter NS last evening with additional 500 ml's this AM.  Last lactic acid 2.6 before last infusion complete.   Adequate pain control on dilaudid PCA over night - plan to transition to po oxycodone this AM (intolerant to percocet).  Sedated after valium 5 mg this AM (tolerated last night).  Ambulated X 3 this AM without postural LH.   No CP, SOB, cough, nausea, reflux, abd pain or distention.  Passing  flatus.  Last BM 4 days ago.  Has ken.           Medications:   All medications reviewed today          Physical Exam:   Blood pressure 94/57, pulse 60, temperature 95.7  F (35.4  C), temperature source Oral, resp. rate 12, last menstrual period 02/03/2019, SpO2 100 %.    Intake/Output Summary (Last 24 hours) at 3/7/2019 1020  Last data filed at 3/7/2019 0600  Gross per 24 hour   Intake 4180 ml   Output 3360 ml   Net 820 ml       General:  Somnolent.    Appropriate response.  No distress.  Off O2. Recheck /64.    Heent:      Neck:    Skin:    Chest:  clear    Cardiac:  Reg without gallop, murmur.  No JVD.     Abdomen:  Non distended, soft, non-tender.  BS normal.     Extremities:  Perfused.  No edema, calf, posterior thigh tenderness to suggest DVT.     Neuro:            Data:     Results for orders placed or performed during the hospital encounter of 03/06/19 (from the past 24 hour(s))   XR Surgery ISIAH Fluoro L/T 5 Min    Narrative    This exam was marked as non-reportable because it will not be read by a   radiologist or a [place] non-radiologist provider.              Internal Medicine Adult IP Consult for [facility]: Patient to be seen: Routine within 24 hrs; Call back #: [phone]; nurse request, parameters for propranalol administration; Consultant may enter orders: Yes; Requesting provider? Other ...    Narrative    Bob Jackson MD     3/7/2019  1:29 AM                                    HOSPITALIST CONSULTATION     REQUESTING PHYSICIAN: Santy Goyal MD    REASON FOR CONSULTATION: Evaluation, Recommendations and co   management of Medical Comorbidities.     ASSESSMENT & PLAN :     Negrita Robledo is a 48 year old female admitted on 3/6/2019   following procedure, s/p following procedure: by Dr Goyal.      Pre-operative diagnosis: Degenerative Spondylolisthesis Primary,   Spinal Stenosis Lumbar Region with Neurogenic Claudication   Post-operative diagnosis: Same   Procedure: Procedure(s):  Lumbar 4-5, L5-S1 Transforaminal Lumbar Interbody Fusion   (Decompression and Instrumented Fusion) Lumbar 4-5, L5-S1 Smith   Gramajo Osteotomy, Right Iliac Crest Autograft  Iliac Crest Autograft      Anesthesia: General   Estimated blood loss: 575 ml, 250 ml returned via cell saver   Blood transfusion No transfusion was given during surgery   [facility]       Post procedure (PACU): Patient with low blood pressure, lactic   acidosis -however clinically asymptomatic.  States usually runs a   soft blood pressure in low 100 at baseline.  No fever or chills.    Remains alert interactive.  Patient with increased back pain and   numbness over right lower extremity.  Evaluated by orthopedic.    CT and MR lumbar spine done.  No epidural hematoma.  Dr. Goyal:   Spinal Cannell patent , no severe foraminal stenosis or   compressive lesions, no obvious fracture or complication.      #Hemodynamics:  -Postop hypotension: Systolic blood pressure mostly 80s/diastolic   40s-50s.  Heart rate controlled.  Breathing fine, oxygenating   high 90s on 3 L  nasal cannula.    Lactic acidosis-3.4.   Patient appears nonseptic.   Suspect likely hypovolemia.  follow-up hemoglobin 10.9.    Chemistry unremarkable.     -Patient's blood pressure improved after fluid bolus (1 L   Ringer's lactate bolus-> 200/hr), with good urine output.   -However lactic acid still remains elevated at 3.5.  Will give   another liter of Ringer's lactate and follow-up level.   -Continue to monitor vitals closely, telemetry monitor, strict   I's and O's,   -[facility] [place]  -Monitor for evidence of sepsis, bleeding.  -Judicious use of narcotics, sedatives.       #Medical history:  Past medical history and preop evaluation reviewed.  Denies   significant cardiopulmonary, blood clot or bleeding or kidney etc   history.    Anxiety disorder: Takes propanolol.  Hold for now given low blood   pressure.  Resume as tolerated.  Continue home venlafaxine.      # Orthopedics:  POD # 0    Anemia of acute blood loss: Monitor hgb . Transfuse for hgb <7.0    Analgesia: Acute on chronic: Per Orthopedic team.   DVT prophylaxis:- Per orthopedic team  Activity per orthopedic team.   Antibiotics and wound care as per primary team.     Encourage Incentive spirometry to prevent atelectasis   Minimize use of narcotics as able. Consider bowel regimen with   narcotics.       Thank you for letting us get involved in care of Ms Robledo.   Please page with any questions.      Bob Jackson MD  House Physician  Pager: [phone]    3/6/2019      CHIEF COMPLAINT: Incisional pain.     HISTORY OF PRESENT ILLNESS: Obtained from the patient and chart   review including Pre op evaluation, procedure note.    48 year old year old female  with above discussed medical   problems s/p above procedure admitted on 3/6/2019  for post op   care and monitoring  (for further details for indication of   surgery and operative note, please refer to Santy Goyal MD note). Medicine consulted to evaluate, recommend   and/or  co manage medical co morbidities.   No documented hypotension, hypoxemia or other significant   complications intra or post operative.   Currently: Incisional Pain +. Denies any chest pain, shortness of   breath or LH or palpitations. Denies any nausea, vomiting or pain   abdomen. No fever or chills. Denies any dysuria.  Denies any new   rash. Sciatica R LE   Medical issues as discussed above.   Denies any other medical concern.     PAST MEDICAL HISTORY:   Past Medical History:   Diagnosis Date     Allergic rhinitis      Anxiety      PONV (postoperative nausea and vomiting)        PAST SURGICAL HISTORY:   Past Surgical History:   Procedure Laterality Date     TONSILLECTOMY & ADENOIDECTOMY       TUBAL LIGATION       TYMPANOSTOMY, LOCAL/TOPICAL ANESTHESIA         FH: reviewed.     Family History   Problem Relation Age of Onset     Anxiety Disorder Mother      Hyperlipidemia Mother      Depression Mother      Unknown/Adopted Father      Cancer Paternal Grandmother         unknown type      No Known Problems Half-Brother      Cancer Paternal Grandfather         unknown type     Unknown/Adopted Maternal Grandmother      Unknown/Adopted Maternal Grandfather         SH: reviewed.     Social History     Socioeconomic History     Marital status: Single     Spouse name: None     Number of children: 0     Years of education: None     Highest education level: None   Occupational History     Occupation: pharmacy tech   Social Needs     Financial resource strain: None     Food insecurity:     Worry: None     Inability: None     Transportation needs:     Medical: None     Non-medical: None   Tobacco Use     Smoking status: Never Smoker     Smokeless tobacco: Never Used   Substance and Sexual Activity     Alcohol use: Yes     Frequency: Monthly or less     Comment: rare     Drug use: No     Sexual activity: None   Lifestyle     Physical activity:     Days per week: None     Minutes per session: None     Stress: None   Relationships      Social connections:     Talks on phone: None     Gets together: None     Attends Evangelical service: None     Active member of club or organization: None     Attends meetings of clubs or organizations: None     Relationship status: None     Intimate partner violence:     Fear of current or ex partner: None     Emotionally abused: None     Physically abused: None     Forced sexual activity: None   Other Topics Concern     None   Social History Narrative     None       ALLERGIES:   Allergies   Allergen Reactions     Animal Dander      Runny nose         HOME MEDICATIONS:     Prior to Admission medications    Medication Sig Start Date End Date Taking? Authorizing Provider   Biotin 5000 MCG CAPS    Yes Reported, Patient   calcium carbonate (OS-SANDRA) 500 MG tablet Take 1 tablet (500 mg)   by mouth daily  Patient taking differently: Take 1 tablet by mouth every morning    12/28/18  Yes Santy Goyal MD   cholecalciferol (VITAMIN D3) 5000 units (125 mcg) capsule Take   5,000 Units by mouth daily   Yes Unknown, Entered By History   Docosahexaenoic Acid (DHA OMEGA 3 PO) Take 1 capsule daily in the   morning     Contains 683 mg EPA and 252 mg DHA   Yes Unknown, Entered By   History   gabapentin (NEURONTIN) 300 MG capsule Take 900 mg by mouth At   Bedtime  1/4/19  Yes Della Baig APRN CNP   Misc Natural Products (GLUCOSAMINE CHONDROITIN TRIPLE PO) Take 1   tablet by mouth daily   Yes Unknown, Entered By History   oxyCODONE-acetaminophen (PERCOCET) 5-325 MG tablet Take 1 tablet   by mouth every 6 hours as needed for severe pain   Yes Reported,   Patient   polyethylene glycol (MIRALAX/GLYCOLAX) packet Take 1 packet by   mouth as needed for constipation (pt states takes miralax when   taking pain medication)   Yes Reported, Patient   propranolol ER (INDERAL LA) 120 MG 24 hr capsule Take 1 capsule   (120 mg) by mouth daily For anxiety management 1/4/19  Yes   Della Baig APRN CNP   tiZANidine  (ZANAFLEX) 2 MG tablet Take 4 mg by mouth At Bedtime    10/22/18  Yes Reported, Patient   traZODone (DESYREL) 50 MG tablet Take 50 mg by mouth At Bedtime    10/25/18  Yes Reported, Patient   venlafaxine (EFFEXOR-XR) 150 MG 24 hr capsule TAKE ONE CAPSULE BY   MOUTH ONCE DAILY IN  THE  MORNING  TO  IMPROVE  MOODS  (take with   75 mg capsule for total daily dose of 225mg) 8/16/18  Yes   Reported, Patient   venlafaxine (EFFEXOR-XR) 75 MG 24 hr capsule TAKE ONE CAPSULE BY   MOUTH ONCE DAILY WITH MEALS FOR  MOOD  -  (Take with 150 mg   capsule for total daily dose of 225 mg) 8/16/18  Yes Reported,   Patient   fluticasone (FLONASE) 50 MCG/ACT nasal spray Spray 1 spray into   both nostrils daily as needed  9/19/17   Reported, Patient   ibuprofen (ADVIL/MOTRIN) 800 MG tablet Take 800 mg by mouth every   8 hours as needed  6/5/18   Reported, Patient       CURRENT MEDICATIONS:    Current Facility-Administered Medications   Medication     benzocaine-menthol (CEPACOL) 15-3.6 MG lozenge 1 lozenge     calcium carbonate (TUMS) chewable tablet 1,000 mg     ceFAZolin (ANCEF) 1 g vial to attach to  ml bag for ADULT   or 50 ml bag for PEDS     dexamethasone (DECADRON) injection 10 mg     diazepam (VALIUM) injection 5 mg     diazepam (VALIUM) tablet 5 mg     diphenhydrAMINE (BENADRYL) capsule 25 mg    Or     diphenhydrAMINE (BENADRYL) injection 25 mg     gabapentin (NEURONTIN) capsule 900 mg     HYDROcodone-acetaminophen (NORCO) 5-325 MG per tablet 1-2   tablet     HYDROmorphone (DILAUDID) PCA 1 mg/mL OPIOID NAIVE     lactated ringers BOLUS 1,000 mL     lactated ringers infusion     lidocaine (LMX4) cream     lidocaine 1 % 0.1-1 mL     metoclopramide (REGLAN) tablet 10 mg    Or     metoclopramide (REGLAN) injection 10 mg     naloxone (NARCAN) injection 0.1-0.4 mg     ondansetron (ZOFRAN) injection 4 mg     ondansetron (ZOFRAN-ODT) ODT tab 4 mg     oxyCODONE (ROXICODONE) tablet 10 mg     polyethylene glycol (MIRALAX/GLYCOLAX)  Packet 17 g     prochlorperazine (COMPAZINE) injection 10 mg    Or     prochlorperazine (COMPAZINE) tablet 10 mg    Or     prochlorperazine (COMPAZINE) Suppository 25 mg     propranolol ER (INDERAL LA) 24 hr capsule 120 mg     scopolamine (TRANSDERM-SCOP) Patch in Place     scopolamine (TRANSDERM-SCOP) Patch in Place     [START ON 3/7/2019] scopolamine (TRANSDERM-SCOP) patch REMOVAL     [START ON 3/7/2019] scopolamine (TRANSDERM-SCOP) patch REMOVAL     senna-docusate (SENOKOT-S/PERICOLACE) 8.6-50 MG per tablet 1   tablet    Or     senna-docusate (SENOKOT-S/PERICOLACE) 8.6-50 MG per tablet 2   tablet     sodium chloride (PF) 0.9% PF flush 3 mL     sodium chloride (PF) 0.9% PF flush 3 mL     traZODone (DESYREL) tablet 50 mg     [START ON 3/7/2019] venlafaxine (EFFEXOR-ER) 24 hr tablet 225   mg         ROS: 10 point ROS neg other than the symptoms noted above in the   HPI.    PHYSICAL EXAMINATION:     BP (!) 86/50   Pulse 68   Temp 96.6  F (35.9  C) (Axillary)     Resp 12   LMP 2019 (Exact Date)   SpO2 99%   Temp (24hrs), Av  F (36.7  C), Min:96.6  F (35.9  C),   Max:98.8  F (37.1  C)      BMI= There is no height or weight on file to calculate BMI.      Intake/Output Summary (Last 24 hours) at 3/6/2019 1825  Last data filed at 3/6/2019 1500  Gross per 24 hour   Intake 3680 ml   Output 1275 ml   Net 2405 ml       General: Alert, interactive, NAD.   HEENT: AT/NC. PERRLA. Anicteric.Moist MM.    Neck: Supple. No JVD. No Lymphadenopathy.  Heart/CVS: Normal S1 and S2. Regular. No m/r/g .   Chest/Respi: Non labored breathing. CTA BL.   Abdomen/GI: Soft, non distended, non tender. No g/r/r.   Extremities/MSK: Distal pulses 2+, well perfused. Rest per ortho.     Neuro: Alert and oriented x4. Cranial nerves II-XII are grossly   intact.    Skin:  No new rash over exposed areas.       LABORATORY DATA: reviewed.     Recent Results (from the past 24 hour(s))   HCG qualitative urine    Collection Time: 19  5:36  AM   Result Value Ref Range    HCG Qual Urine Negative NEG^Negative   Hemoglobin    Collection Time: 03/06/19  6:16 AM   Result Value Ref Range    Hemoglobin 13.7 11.7 - 15.7 g/dL   Glucose    Collection Time: 03/06/19  6:16 AM   Result Value Ref Range    Glucose 98 70 - 99 mg/dL       Recent Results (from the past 24 hour(s))   XR Surgery ISIAH Fluoro L/T 5 Min    Narrative    This exam was marked as non-reportable because it will not be   read by a   radiologist or a [place] non-radiologist provider.                     Bob Jackson MD   CBC with platelets   Result Value Ref Range    WBC 14.9 (H) 4.0 - 11.0 10e9/L    RBC Count 3.66 (L) 3.8 - 5.2 10e12/L    Hemoglobin 10.9 (L) 11.7 - 15.7 g/dL    Hematocrit 33.5 (L) 35.0 - 47.0 %    MCV 92 78 - 100 fl    MCH 29.8 26.5 - 33.0 pg    MCHC 32.5 31.5 - 36.5 g/dL    RDW 12.8 10.0 - 15.0 %    Platelet Count 174 150 - 450 10e9/L   Basic metabolic panel   Result Value Ref Range    Sodium 139 133 - 144 mmol/L    Potassium 3.9 3.4 - 5.3 mmol/L    Chloride 107 94 - 109 mmol/L    Carbon Dioxide 22 20 - 32 mmol/L    Anion Gap 10 3 - 14 mmol/L    Glucose 133 (H) 70 - 99 mg/dL    Urea Nitrogen 11 7 - 30 mg/dL    Creatinine 0.75 0.52 - 1.04 mg/dL    GFR Estimate >90 >60 mL/min/[1.73_m2]    GFR Estimate If Black >90 >60 mL/min/[1.73_m2]    Calcium 8.0 (L) 8.5 - 10.1 mg/dL   Blood gas venous   Result Value Ref Range    Ph Venous 7.45 (H) 7.32 - 7.43 pH    PCO2 Venous 35 (L) 40 - 50 mm Hg    PO2 Venous 20 (L) 25 - 47 mm Hg    Bicarbonate Venous 24 21 - 28 mmol/L    Base Excess Venous 0.2 mmol/L    FIO2 3L/MIN    Lactic acid whole blood   Result Value Ref Range    Lactic Acid 3.4 (H) 0.7 - 2.0 mmol/L   MR Lumbar Spine w/o Contrast    Narrative    MR LUMBAR SPINE W/O CONTRAST 3/6/2019 7:34 PM    Provided History: Back pain, cauda equina syndrome suspected;  reporting S1 radicular symptoms    ICD-10:    Comparison: CT dated 12/28/2018, MR dated 11/8/2018.    Technique: Sagittal  T1-weighted, sagittal STIR, 3D volumetric axial  and sagittal reconstructed T2-weighted images of the lumbar spine were  obtained without intravenous contrast.     Findings: Regarding numbering convention, there are 5 lumbar-type  vertebrae assumed for the purposes of this dictation.  The tip of the  conus medullaris is at L2.  Postsurgical changes of spinal fusion from  L4 through S1. Images are mildly degraded by metal artifact. Disc  spacers at L4-5 and L5-S1. There is a T1 and T2 hyperintense lesion in  the vertebral body of L1, likely a hemangioma. No significant changes  compared to prior MR. Mild dependent edema of the posterior soft  tissues. Degenerative endplate changes at L5-S1.    On a level by level basis:    T12-L1: No spinal canal or neuroforaminal stenosis.    L1-2: No spinal canal or neuroforaminal stenosis.    L2-3: No spinal canal or neuroforaminal stenosis.    L3-4: Mild posterior disc bulge which is not well visualized on the  axial images secondary to metallic artifact. No spinal canal or neural  foraminal stenosis.    L4-5: No spinal canal or neuroforaminal stenosis. Images limited by  metallic artifact.    L5-S1: No spinal canal or neuroforaminal stenosis. Images limited by  metallic artifact.      Impression    Impression:   Postsurgical changes of attempted spinal fusion tension from L4  through S1. Hardware partially obscuring the spinal canal and neural  foramina from L3 through L5. No definite neural foraminal or spinal  canal stenosis.      I have personally reviewed the examination and initial interpretation  and I agree with the findings.    ADAMA EARLY MD   CT Lumbar Spine w/o Contrast    Narrative    CT LUMBAR SPINE W/O CONTRAST 3/6/2019 8:03 PM    History: Back pain, cauda equina syndrome suspected; evaluate implants  s/p fusion surgery.  ICD-10:    Comparison: Same-day lumbar MRI.    Technique: Using multidetector thin collimation helical acquisition  technique, axial, coronal  and sagittal CT images through the lumbar  spine were obtained without intravenous contrast.     Findings: There are 5 lumbar type vertebrae. Postsurgical changes of  the lumbar spine from L4 through S1 with intact appearing posterior  approach hardware and intervertebral disc spacers. Regarding  alignment, the lumbar spine alignment appears preserved. There is no  significant disc space narrowing at any level. Corduroy lesion in the  body of L1, likely hemangioma. Findings on a level by level basis are  as follows:    L1-L2: No spinal canal or neuroforaminal stenosis.    L2-L3:  No spinal canal or neuroforaminal stenosis.    L3-L4: No spinal canal or neuroforaminal stenosis. Mild disc bulge.    L4-L5: Images mildly degraded by metal artifact. No overt spinal canal  or neuroforaminal stenosis.. Bilateral foraminotomies.    L5-S1: Images mildly degraded by metal artifact. No overt spinal canal  or neuroforaminal stenosis.. Bilateral foraminotomies.    The visualized adjacent paraspinous tissues are grossly within normal  limits.      Impression    Impression:  1. Postsurgical changes with spinal fusion hardware from L4 through  S1. Hardware appears intact and appropriately alignment.  2. Images mildly degraded by metal artifact. No overt spinal canal or  neural foraminal stenosis.    I have personally reviewed the examination and initial interpretation  and I agree with the findings.    ADAMA EARLY MD   Lactic acid whole blood   Result Value Ref Range    Lactic Acid 3.5 (H) 0.7 - 2.0 mmol/L   Lactic acid whole blood   Result Value Ref Range    Lactic Acid 2.9 (H) 0.7 - 2.0 mmol/L   CBC with platelets   Result Value Ref Range    WBC 19.4 (H) 4.0 - 11.0 10e9/L    RBC Count 3.51 (L) 3.8 - 5.2 10e12/L    Hemoglobin 10.5 (L) 11.7 - 15.7 g/dL    Hematocrit 32.4 (L) 35.0 - 47.0 %    MCV 92 78 - 100 fl    MCH 29.9 26.5 - 33.0 pg    MCHC 32.4 31.5 - 36.5 g/dL    RDW 13.0 10.0 - 15.0 %    Platelet Count 179 150 - 450 10e9/L    Basic metabolic panel   Result Value Ref Range    Sodium 141 133 - 144 mmol/L    Potassium 4.0 3.4 - 5.3 mmol/L    Chloride 107 94 - 109 mmol/L    Carbon Dioxide 25 20 - 32 mmol/L    Anion Gap 9 3 - 14 mmol/L    Glucose 134 (H) 70 - 99 mg/dL    Urea Nitrogen 8 7 - 30 mg/dL    Creatinine 0.68 0.52 - 1.04 mg/dL    GFR Estimate >90 >60 mL/min/[1.73_m2]    GFR Estimate If Black >90 >60 mL/min/[1.73_m2]    Calcium 8.2 (L) 8.5 - 10.1 mg/dL   Lactic acid whole blood   Result Value Ref Range    Lactic Acid 2.6 (H) 0.7 - 2.0 mmol/L              Assessment and Plan:   1)  S/P Lumbar 4-5, L5-S1 Transforaminal Lumbar Interbody Fusion (Decompression and Instrumented Fusion) Lumbar 4-5, L5-S1 Smith Gramajo Osteotomy, Right Iliac Crest Autograft.  Adequate pain control with PCA.  Mobilizing.  2)  Acute blood loss anemia.  Adequate.  3)  Post op hypotension with lactic acid elevation attributed to devrease volume improved with IV fluids.     4)  Hypoxemia over night 2nd to sedation from residual effect of amesthesia and administered opiates.  Improved.   5)  Anxiety disorder.  On propranolol.  Appears compensated.    PLAN:  1)  Review meds, orders. Parameters for propranolol.   2)  Recheck lactic acid 12 noon.  Continue  ml/hr.  3)  Transition off PCA to po dilaudid.  [id number] for continued excess sedation with valium.  4)  IS, bowel meds (miralax, senokot, supp), mobilize, mechanical DVT prophylaxis.  5)  Trend labs.  Monitor clinically.    Disposition Plan   Expected discharge in 2-3 days to prior living arrangement once mobilizing safely, adequate pain control, stable BP.     Entered: Michelet Padgett 03/07/2019, 10:20 AM              Attestation:  I have reviewed today's vital signs, notes, medications, labs and imaging.     Michelet Padgett MD

## 2019-03-07 NOTE — PROGRESS NOTES
Orthopaedic Surgery Progress Note 03/07/2019    S: Pain well controlled on IV/Oral meds. Denies numbness or tingling in the affected extremity. chest pain (-), SOB(-), nausea/vomiting(-). Will start advancing diet slowly. BM(-) flatus(-). Voiding spontaneously.  Ambulating, working with therapy today.    Overnight, patient was evaluated by Dr. Goyal for severe back pain following her surgical intervention. She was sent for both a CT scan and MRI which were evaluated by Dr. Goyal. Imaging did not demonstrate any severe foraminal stenosis nor did it show any compressive fluid lesions. Decadron and Valium were added to the patient's pain medication regimen in hopes to improve her pain control.     Lactic acid trend: 3.4 -> 3.5 -> 2.9 -> pending. Received 2L in boluses overnight  Elevated WBC 2/2 post-operative state and steroids    Patient was ambulating hallways this AM with minimal assist. Great improvement compared to last evening.    O:  Temp: 95.7  F (35.4  C) Temp src: Oral BP: 94/57(Lying) Pulse: 60 Heart Rate: 69 Resp: 12 SpO2: 100 % O2 Device: Nasal cannula Oxygen Delivery: 1.5 LPM    Exam:  Gen: No acute distress, resting comfortably in bed.  Resp: Non-labored breathing  CV: wwp  MSK:  Spine:  - Dressings c/d/i  - Drain patent with serosanguinous output  - DP pulses 2+ bilaterally, feet wwp bilaterally  Lumbar Spine:   Motor -    L2-3: Hip flexion R 5/5  And L 5/5 strength         L3/4:  Knee extension R 5/5 and L 5/5 strength        L4/5:  Foot dorsiflexion R 5/5 L 5/5 and      EHL dorsiflexion R 5/5 L 5/5 strength        S1:  Plantarflexion/Peroneal Muscles  R 5/5 and L 5/5 strength   Sensation: intact to light touch L3-S1 distribution BLE       Drain output: Last 8 hours - 15 Russian 240mL; 10 Russian 0mL.    Recent Labs   Lab 03/07/19  0411 03/06/19  1844 03/06/19  0616   WBC 19.4* 14.9*  --    HGB 10.5* 10.9* 13.7    174  --      No results found for: SED    Assessment: Negrita Hannah Malenke is a 48  year old female s/p L4-S1 TLIF, L4-S1 SPO and Right iliac crest autograft on 3/6/2019 with Dr. Goyal. Doing well.    Plan:  Ortho Primary  Activity: Up with assist until independent. No excessive bending or twisting. No lifting >10 lbs x 6 weeks. No Paul lift for transfers.   Weight bearing status: WBAT.  Pain management: Transition from IV to PO as tolerated.    Antibiotics: Ancef x 24 hours.  Diet: Begin with clear fluids and progress diet as tolerated.   DVT prophylaxis: SCDs only. No chemical DVT ppx needed at discharge.  Imaging: XR Upright Lumbar PTDC - ordered.  Labs: Hgb POD#1-3.  Bracing/Splinting: None.  Dressings: Keep Aquacel c/d/i x 7 days.  Drains: Document output per shift, will be discontinued at Orthopedic Surgery discretion.  Jauregui catheter: In place.   Physical Therapy/Occupational Therapy: Eval and treat.  Consults: Medicine, PT, OT.  Follow-up: Clinic with Dr. Goyal in 6 weeks with standing AP/Lat lumb x-rays needed.   Disposition: Pending progress with therapies, pain control on orals, and medical stability, anticipate discharge to home/TCU on POD #3-5.    Future Appointments   Date Time Provider Department Center   3/7/2019  8:30 AM Vale Pedro Pt, PT [facility] [place]   3/7/2019 11:00 AM Laurita Alejandre, OT [facility] [place]   3/7/2019  1:00 PM Vale Pedro Pt, PT [facility] [place]   4/19/2019 11:00 AM Santy Goyal MD [facility]       Patient discussed with Dr. Goyal.    Shahnaz Cota MD  Orthopaedic Surgery, PGY-1  [phone]    Please page me directly with any questions/concerns during regular weekday hours before 5pm. If there is no response, if it is a weekend, or if it is during evening hours then please page the orthopaedic surgery resident on call.

## 2019-03-07 NOTE — SIGNIFICANT EVENT
I met with the patient at bedside this evening at about 6 PM.  At that time she was having severe back pain.  She says it felt like a spasm or sharp pain.  In addition she said her sciatica was acting up.  This was the same leg pain that she had before surgery but it was more severe.  It felt like it was shooting down the backs of the legs.  She felt like she could feel everything but that subjectively there was some numbness on the plantar feet.      I did examine her and she had 5 out of 5 strength with knee extension tibialis anterior ankle plantarflexion and peroneal muscles and 4 out of 5 extensor hallux longus, all of which was consistent with her baseline strength. She had quite a bit of pain with these maneuvers and it took some encouragement to get full effort to achieve this motor grade.    She does have both the deep and superficial drain and I checked these and I saw that they were holding suction and draining adequately.  Nonetheless I was concerned that she might be developing an epidural hematoma.  I sent her for both a CT scan and an MRI.  These were completed at about 8 PM and I have just reviewed them.  The spinal canal looks fully patent to me I do not see any severe foraminal stenosis and I do not see any compressive fluid lesions on the studies.  The instrumentation is in good place with an excellent amount of bone graft in the disc space.  I do not see any obvious fractures or other obvious complications at this point.    I communicated the overall good news to the patient that the imaging looks good.  I would like to try and get her pain under better control and I have added some Decadron and Valium to her pain regimen.  We will monitor things closely.    Santy Goyal MD

## 2019-03-08 ENCOUNTER — APPOINTMENT (OUTPATIENT)
Dept: GENERAL RADIOLOGY | Facility: CLINIC | Age: 49
End: 2019-03-08
Attending: PHYSICIAN ASSISTANT
Payer: COMMERCIAL

## 2019-03-08 ENCOUNTER — APPOINTMENT (OUTPATIENT)
Dept: PHYSICAL THERAPY | Facility: CLINIC | Age: 49
End: 2019-03-08
Attending: ORTHOPAEDIC SURGERY
Payer: COMMERCIAL

## 2019-03-08 ENCOUNTER — APPOINTMENT (OUTPATIENT)
Dept: OCCUPATIONAL THERAPY | Facility: CLINIC | Age: 49
End: 2019-03-08
Attending: ORTHOPAEDIC SURGERY
Payer: COMMERCIAL

## 2019-03-08 LAB
ANION GAP SERPL CALCULATED.3IONS-SCNC: 8 MMOL/L (ref 3–14)
BUN SERPL-MCNC: 7 MG/DL (ref 7–30)
CALCIUM SERPL-MCNC: 7.9 MG/DL (ref 8.5–10.1)
CHLORIDE SERPL-SCNC: 107 MMOL/L (ref 94–109)
CO2 SERPL-SCNC: 28 MMOL/L (ref 20–32)
CREAT SERPL-MCNC: 0.56 MG/DL (ref 0.52–1.04)
ERYTHROCYTE [DISTWIDTH] IN BLOOD BY AUTOMATED COUNT: 13.6 % (ref 10–15)
GFR SERPL CREATININE-BSD FRML MDRD: >90 ML/MIN/{1.73_M2}
GLUCOSE SERPL-MCNC: 130 MG/DL (ref 70–99)
HCT VFR BLD AUTO: 28.4 % (ref 35–47)
HGB BLD-MCNC: 9 G/DL (ref 11.7–15.7)
LACTATE BLD-SCNC: 1.6 MMOL/L (ref 0.7–2)
LACTATE BLD-SCNC: 2.2 MMOL/L (ref 0.7–2)
LACTATE BLD-SCNC: 2.7 MMOL/L (ref 0.7–2)
MAGNESIUM SERPL-MCNC: 2.1 MG/DL (ref 1.6–2.3)
MCH RBC QN AUTO: 29.7 PG (ref 26.5–33)
MCHC RBC AUTO-ENTMCNC: 31.7 G/DL (ref 31.5–36.5)
MCV RBC AUTO: 94 FL (ref 78–100)
PLATELET # BLD AUTO: 153 10E9/L (ref 150–450)
POTASSIUM SERPL-SCNC: 4 MMOL/L (ref 3.4–5.3)
RBC # BLD AUTO: 3.03 10E12/L (ref 3.8–5.2)
SODIUM SERPL-SCNC: 143 MMOL/L (ref 133–144)
WBC # BLD AUTO: 20 10E9/L (ref 4–11)

## 2019-03-08 PROCEDURE — 83605 ASSAY OF LACTIC ACID: CPT | Performed by: OTOLARYNGOLOGY

## 2019-03-08 PROCEDURE — 97530 THERAPEUTIC ACTIVITIES: CPT | Mod: GP | Performed by: PHYSICAL THERAPIST

## 2019-03-08 PROCEDURE — 99207 ZZC CDG-MDM COMPONENT: MEETS LOW - DOWN CODED: CPT | Performed by: INTERNAL MEDICINE

## 2019-03-08 PROCEDURE — 36415 COLL VENOUS BLD VENIPUNCTURE: CPT | Performed by: OTOLARYNGOLOGY

## 2019-03-08 PROCEDURE — 97110 THERAPEUTIC EXERCISES: CPT | Mod: GP | Performed by: PHYSICAL THERAPIST

## 2019-03-08 PROCEDURE — 72100 X-RAY EXAM L-S SPINE 2/3 VWS: CPT

## 2019-03-08 PROCEDURE — 83735 ASSAY OF MAGNESIUM: CPT | Performed by: INTERNAL MEDICINE

## 2019-03-08 PROCEDURE — 97535 SELF CARE MNGMENT TRAINING: CPT | Mod: GO | Performed by: OCCUPATIONAL THERAPIST

## 2019-03-08 PROCEDURE — 25000132 ZZH RX MED GY IP 250 OP 250 PS 637: Performed by: INTERNAL MEDICINE

## 2019-03-08 PROCEDURE — 25000132 ZZH RX MED GY IP 250 OP 250 PS 637: Performed by: STUDENT IN AN ORGANIZED HEALTH CARE EDUCATION/TRAINING PROGRAM

## 2019-03-08 PROCEDURE — 25000132 ZZH RX MED GY IP 250 OP 250 PS 637: Performed by: ORTHOPAEDIC SURGERY

## 2019-03-08 PROCEDURE — 83605 ASSAY OF LACTIC ACID: CPT | Performed by: INTERNAL MEDICINE

## 2019-03-08 PROCEDURE — 80048 BASIC METABOLIC PNL TOTAL CA: CPT | Performed by: INTERNAL MEDICINE

## 2019-03-08 PROCEDURE — 25000132 ZZH RX MED GY IP 250 OP 250 PS 637: Performed by: PHYSICIAN ASSISTANT

## 2019-03-08 PROCEDURE — 25800030 ZZH RX IP 258 OP 636: Performed by: INTERNAL MEDICINE

## 2019-03-08 PROCEDURE — 85027 COMPLETE CBC AUTOMATED: CPT | Performed by: INTERNAL MEDICINE

## 2019-03-08 PROCEDURE — 99232 SBSQ HOSP IP/OBS MODERATE 35: CPT | Performed by: INTERNAL MEDICINE

## 2019-03-08 PROCEDURE — 12000001 ZZH R&B MED SURG/OB UMMC

## 2019-03-08 PROCEDURE — 97116 GAIT TRAINING THERAPY: CPT | Mod: GP | Performed by: PHYSICAL THERAPIST

## 2019-03-08 PROCEDURE — 25000128 H RX IP 250 OP 636: Performed by: ORTHOPAEDIC SURGERY

## 2019-03-08 PROCEDURE — 36415 COLL VENOUS BLD VENIPUNCTURE: CPT | Performed by: INTERNAL MEDICINE

## 2019-03-08 RX ORDER — DIAZEPAM 5 MG
5 TABLET ORAL EVERY 6 HOURS PRN
Qty: 20 TABLET | Refills: 0 | Status: SHIPPED | OUTPATIENT
Start: 2019-03-08

## 2019-03-08 RX ORDER — ACETAMINOPHEN 500 MG
1000 TABLET ORAL EVERY 8 HOURS
Status: DISCONTINUED | OUTPATIENT
Start: 2019-03-08 | End: 2019-03-10 | Stop reason: HOSPADM

## 2019-03-08 RX ORDER — AMOXICILLIN 250 MG
1 CAPSULE ORAL 2 TIMES DAILY
Qty: 30 TABLET | Refills: 0 | Status: SHIPPED | OUTPATIENT
Start: 2019-03-08

## 2019-03-08 RX ORDER — LIDOCAINE 4 G/G
1-2 PATCH TOPICAL EVERY 24 HOURS
Qty: 30 PATCH | Refills: 0 | Status: SHIPPED | OUTPATIENT
Start: 2019-03-08 | End: 2019-03-10

## 2019-03-08 RX ORDER — ACETAMINOPHEN 500 MG
1000 TABLET ORAL EVERY 8 HOURS
Qty: 100 TABLET | Refills: 0 | Status: SHIPPED | OUTPATIENT
Start: 2019-03-08

## 2019-03-08 RX ORDER — HYDROMORPHONE HYDROCHLORIDE 4 MG/1
2-4 TABLET ORAL
Qty: 56 TABLET | Refills: 0 | Status: SHIPPED | OUTPATIENT
Start: 2019-03-08 | End: 2019-04-04 | Stop reason: DRUGHIGH

## 2019-03-08 RX ORDER — LIDOCAINE 4 G/G
1-2 PATCH TOPICAL
Status: DISCONTINUED | OUTPATIENT
Start: 2019-03-08 | End: 2019-03-10 | Stop reason: HOSPADM

## 2019-03-08 RX ORDER — ACETAMINOPHEN 325 MG/1
650 TABLET ORAL EVERY 4 HOURS PRN
Qty: 100 TABLET | Refills: 0 | Status: SHIPPED | OUTPATIENT
Start: 2019-03-08 | End: 2019-03-08

## 2019-03-08 RX ORDER — DEXAMETHASONE SODIUM PHOSPHATE 10 MG/ML
10 INJECTION, SOLUTION INTRAMUSCULAR; INTRAVENOUS EVERY 12 HOURS
Status: DISCONTINUED | OUTPATIENT
Start: 2019-03-08 | End: 2019-03-08

## 2019-03-08 RX ADMIN — DIAZEPAM 5 MG: 5 TABLET ORAL at 22:12

## 2019-03-08 RX ADMIN — HYDROMORPHONE HYDROCHLORIDE 4 MG: 2 TABLET ORAL at 04:45

## 2019-03-08 RX ADMIN — DEXAMETHASONE SODIUM PHOSPHATE 10 MG: 10 INJECTION, SOLUTION INTRAMUSCULAR; INTRAVENOUS at 08:15

## 2019-03-08 RX ADMIN — ACETAMINOPHEN 1000 MG: 500 TABLET, FILM COATED ORAL at 17:02

## 2019-03-08 RX ADMIN — SODIUM CHLORIDE, POTASSIUM CHLORIDE, SODIUM LACTATE AND CALCIUM CHLORIDE: 600; 310; 30; 20 INJECTION, SOLUTION INTRAVENOUS at 02:04

## 2019-03-08 RX ADMIN — HYDROMORPHONE HYDROCHLORIDE 4 MG: 2 TABLET ORAL at 14:03

## 2019-03-08 RX ADMIN — DEXAMETHASONE SODIUM PHOSPHATE 10 MG: 10 INJECTION, SOLUTION INTRAMUSCULAR; INTRAVENOUS at 00:34

## 2019-03-08 RX ADMIN — HYDROMORPHONE HYDROCHLORIDE 4 MG: 2 TABLET ORAL at 19:58

## 2019-03-08 RX ADMIN — HYDROMORPHONE HYDROCHLORIDE 4 MG: 2 TABLET ORAL at 17:02

## 2019-03-08 RX ADMIN — DIAZEPAM 5 MG: 5 TABLET ORAL at 00:34

## 2019-03-08 RX ADMIN — GABAPENTIN 300 MG: 300 CAPSULE ORAL at 13:45

## 2019-03-08 RX ADMIN — HYDROMORPHONE HYDROCHLORIDE 4 MG: 2 TABLET ORAL at 08:15

## 2019-03-08 RX ADMIN — LIDOCAINE 2 PATCH: 560 PATCH PERCUTANEOUS; TOPICAL; TRANSDERMAL at 11:10

## 2019-03-08 RX ADMIN — GABAPENTIN 300 MG: 300 CAPSULE ORAL at 08:19

## 2019-03-08 RX ADMIN — VENLAFAXINE HYDROCHLORIDE 225 MG: 150 TABLET, EXTENDED RELEASE ORAL at 08:19

## 2019-03-08 RX ADMIN — HYDROMORPHONE HYDROCHLORIDE 4 MG: 2 TABLET ORAL at 23:53

## 2019-03-08 RX ADMIN — HYDROMORPHONE HYDROCHLORIDE 4 MG: 2 TABLET ORAL at 11:07

## 2019-03-08 RX ADMIN — HYDROMORPHONE HYDROCHLORIDE 4 MG: 2 TABLET ORAL at 00:34

## 2019-03-08 RX ADMIN — GABAPENTIN 900 MG: 300 CAPSULE ORAL at 22:12

## 2019-03-08 RX ADMIN — SODIUM CHLORIDE, POTASSIUM CHLORIDE, SODIUM LACTATE AND CALCIUM CHLORIDE: 600; 310; 30; 20 INJECTION, SOLUTION INTRAVENOUS at 11:05

## 2019-03-08 RX ADMIN — ACETAMINOPHEN 1000 MG: 500 TABLET, FILM COATED ORAL at 11:08

## 2019-03-08 RX ADMIN — TRAZODONE HYDROCHLORIDE 50 MG: 50 TABLET ORAL at 22:12

## 2019-03-08 RX ADMIN — SODIUM CHLORIDE, POTASSIUM CHLORIDE, SODIUM LACTATE AND CALCIUM CHLORIDE: 600; 310; 30; 20 INJECTION, SOLUTION INTRAVENOUS at 18:43

## 2019-03-08 ASSESSMENT — ACTIVITIES OF DAILY LIVING (ADL)
ADLS_ACUITY_SCORE: 17

## 2019-03-08 ASSESSMENT — PAIN DESCRIPTION - DESCRIPTORS
DESCRIPTORS: SHARP;STABBING
DESCRIPTORS: ACHING;SHARP
DESCRIPTORS: SHARP;STABBING;RADIATING

## 2019-03-08 NOTE — PLAN OF CARE
Discharge Planner OT   Patient plan for discharge: home with assist  Current status: Pt educated on LE dressing AE - able to complete task with mod (I); also demonstrated mod (I) with toilet transfer/toileting. Pt educated on safe tub/shower transfer technique and completed with SBA. Pt demonstrated good awareness of surgical precautions during session and asking appropriate questions regarding modifying ADL/IADLs - writer provided further education. Pt with no further questions at end of session and has met all OT goals.  Barriers to return to prior living situation: none  Recommendations for discharge: Home with assist for heavy household chores due to precautions  Rationale for recommendations: pt has met all OT goals and is safe to discharge home when cleared by PT/MD       Entered by: Laurita Alejandre 03/08/2019 3:38 PM     Occupational Therapy Discharge Summary    Reason for therapy discharge:    All goals and outcomes met, no further needs identified.    Progress towards therapy goal(s). See goals on Care Plan in [facility] electronic health record for goal details.  Goals met    Therapy recommendation(s):    No further therapy is recommended.

## 2019-03-08 NOTE — PROGRESS NOTES
Patient is AxO. VSS. Denies nay chest pain, SOB, new numbness or tingling. Pain was better managed this shift with oral dilaudid q3.PIV infusing this shift with 500cc bolus q2 per orders. Jauregui patent and draining until removal at 1845. No BM this shift but patient reports passing a lot of gas and states she feels a BM coming. Patient wanting to wait until pain was managed and to see if she has a BM on her own before suppository. Patient agreeable to do suppository if no BM by 2100. Bowel sounds active in all four quadrants. Tolerating regular diet. Hemovac and SARITA draining and dressing remain intact. Patient was able to use call light to make needs known and call light remained within reach for the duration of the shift. Continue POC.

## 2019-03-08 NOTE — PROGRESS NOTES
Orthopaedic Surgery Progress Note 03/09/2019    S: Pain well controlled on Oral meds. Denies numbness or tingling in the affected extremity. chest pain (-), SOB(-), nausea/vomiting(-). Tolerating oral diet. BM(+) flatus(+). Voiding spontaneously.  Ambulating. Feels much better overall today and is ready to go home.     Lactic acid trend: 3.4 -> 3.5 -> 2.9 -> 3.5 -> 2.7->2.2->1.6    Patient was ambulating the halls this AM with no assist.     O:  Temp: 97.4  F (36.3  C) Temp src: Oral BP: 107/52 Pulse: 69 Heart Rate: 78 Resp: 16 SpO2: 99 % O2 Device: None (Room air)      Exam:  Gen: No acute distress, resting comfortably in bed.  Resp: Non-labored breathing  CV: wwp  MSK:  Spine:  - Dressings c/d/i  - Drain patent with serosanguinous output  - DP pulses 2+ bilaterally, feet wwp bilaterally  Lumbar Spine:   Motor -    L2-3: Hip flexion R 5/5  And L 5/5 strength         L3/4:  Knee extension R 5/5 and L 5/5 strength        L4/5:  Foot dorsiflexion R 5/5 L 5/5 and      EHL dorsiflexion R 5/5 L 5/5 strength        S1:  Plantarflexion/Peroneal Muscles  R 5/5 and L 5/5 strength   Sensation: intact to light touch L3-S1 distribution BLE       Drain output:  75/60cc last 2     Recent Labs   Lab 03/09/19  0601 03/08/19  0829 03/07/19  0411   WBC 14.2* 20.0* 19.4*   HGB 8.6* 9.0* 10.5*   * 153 179         Assessment: Negrita Robledo is a 48 year old female s/p L4-S1 TLIF, L4-S1 SPO and Right iliac crest autograft on 3/6/2019 with Dr. Goyal. Doing well.    Ortho Primary  Activity: Up with assist until independent. No excessive bending or twisting. No lifting >10 lbs x 6 weeks.   Weight bearing status: WBAT.  Pain management: Transition from IV to PO as tolerated.    Antibiotics: Ancef x 24 hours.  Diet: Begin with clear fluids and progress diet as tolerated.   DVT prophylaxis: SCDs only. No chemical DVT ppx needed at discharge.  Imaging: XR Upright Lumbar PTDC - completed  Labs: Hgb POD#1-3.  Bracing/Splinting:  None.  Dressings: Keep Aquacel c/d/i x 7 days.  Drains: Document output per shift, will be discontinued at Orthopedic Surgery discretion.  Physical Therapy/Occupational Therapy: Eval and treat.  Consults: Medicine, PT, OT.  Follow-up: Clinic with Dr. Goyal in 6 weeks with standing AP/Lat lumb x-rays needed.   Disposition: Pending progress with therapies, pain control on orals, and medical stability, anticipate discharge to home on Sunday    Future Appointments   Date Time Provider Department Center   3/7/2019  8:30 AM Vale Pedro Pt, PT URPT [place]   3/7/2019 11:00 AM Laurita Alejandre, ABDIAS UROT [place]   3/7/2019  1:00 PM Vale Pedro Pt, PT [facility] [place]   4/19/2019 11:00 AM Santy Goyal MD [facility]       Patient discussed with Dr. Goyal.    Lucas Squires MD   Orthopaedic Surgery Resident, PGY-4  Pager: [phone]

## 2019-03-08 NOTE — PLAN OF CARE
Discharge Planner PT   Patient plan for discharge: Home with assist  Current status: Pt in bed at start of PT session, initially declined OOB activity, then quickly got out of bed without awareness of IV or safety. Pt encouraged to slow down for safety. Sit to/from standing with FWW and SBA. SBA for bed mobility. Pt ambulated 400' with FWW and SBA. Pt is generally steady on feet, but impulsive with movement today. Pt less aware of obstacles in hallway, minor LOB occurred after hitting cart in hallway, pt recovered independently. Pt tolerated supine LE strengthening without issue.  Barriers to return to prior living situation: safety, independence  Recommendations for discharge: Home with assist  Rationale for recommendations: Anticipate pt will be safe to discharge home pending continued mobility progress.       Entered by: Nia Bowden 03/08/2019 12:08 PM

## 2019-03-08 NOTE — PROGRESS NOTES
[facility], [place]    Sepsis Evaluation Progress Note    Date of Service: 03/07/2019    I was called to see Negrita Robledo due to abnormal vital signs triggering the Sepsis SIRS screening alert. She is not known to have an infection.     Physical Exam    Vital Signs:  Temp: 98.8  F (37.1  C) Temp src: Oral BP: 105/46 Pulse: 60 Heart Rate: 72 Resp: 18 SpO2: 98 % O2 Device: None (Room air) Oxygen Delivery: 1.5 LPM    Lab:  Lactic Acid   Date Value Ref Range Status   03/07/2019 3.5 (H) 0.7 - 2.0 mmol/L Final       The patient is at baseline mental status; jocular, pleasant    The rest of their physical exam is significant for well perrfused extremities, soft abdomen, CTAB    Assessment and Plan    The SIRS and exam findings are not likely 2/2 sepsis    Disposition: The patient will remain on the current unit. We will continue to monitor this patient closely.  1L IVF  Check level in 4 hours    Eliseo Strong MD

## 2019-03-08 NOTE — PLAN OF CARE
VS:   /46 (BP Location: Right arm)   Pulse 60   Temp 98.8  F (37.1  C) (Oral)   Resp 18   LMP 02/03/2019 (Exact Date)   SpO2 98%      Output:   Voids spontaneously, LBM 3/8/19, + gas and + BS   Lungs Clear    Activity:   SBA to Independent w/ walker    Skin: Intact except incision mid back    Pain:   Well controlled with 4mg of Dilaudid    Neuro/CMS:   A&O x 4, cms intact, patient reports tingling in groin area but states the tingling moves    Dressing(s):   Mid back, CDI   Diet:   Regular.    LDA:   Satinder Drain, Hemovac and PIV L hand infusing    Equipment:   IV pole, PCD's    Plan:   Continue to monitor    Additional Info:   LA continue to be elevated 2.7 at 4am. Consult with medicine team re: LA

## 2019-03-08 NOTE — PROGRESS NOTES
Orthopaedic Surgery Progress Note 03/08/2019    S: Pain well controlled on Oral meds. Denies numbness or tingling in the affected extremity. chest pain (-), SOB(-), nausea/vomiting(-). Tolerating oral diet. BM(+) flatus(+). Voiding spontaneously.  Ambulating, working with therapy today.    Lactic acid trend: 3.4 -> 3.5 -> 2.9 -> 3.5 -> 2.7.    Patient was ambulating the halls this AM with no assist.     O:  Temp: 97.8  F (36.6  C) Temp src: Oral BP: 107/52   Heart Rate: 76 Resp: 16 SpO2: 98 % O2 Device: None (Room air)      Exam:  Gen: No acute distress, resting comfortably in bed.  Resp: Non-labored breathing  CV: wwp  MSK:  Spine:  - Dressings c/d/i  - Drain patent with serosanguinous output  - DP pulses 2+ bilaterally, feet wwp bilaterally  Lumbar Spine:   Motor -    L2-3: Hip flexion R 5/5  And L 5/5 strength         L3/4:  Knee extension R 5/5 and L 5/5 strength        L4/5:  Foot dorsiflexion R 5/5 L 5/5 and      EHL dorsiflexion R 5/5 L 5/5 strength        S1:  Plantarflexion/Peroneal Muscles  R 5/5 and L 5/5 strength   Sensation: intact to light touch L3-S1 distribution BLE       Drain output: Last 8 hours - 15 Khmer 95mL; 10 Khmer 10mL.    Recent Labs   Lab 03/07/19  0411 03/06/19  1844 03/06/19  0616   WBC 19.4* 14.9*  --    HGB 10.5* 10.9* 13.7    174  --      No results found for: SED    Assessment: Negrita Robledo is a 48 year old female s/p L4-S1 TLIF, L4-S1 SPO and Right iliac crest autograft on 3/6/2019 with Dr. Goyal. Doing well.    Plan:  Ortho Primary  Activity: Up with assist until independent. No excessive bending or twisting. No lifting >10 lbs x 6 weeks. No Paul lift for transfers.   Weight bearing status: WBAT.  Pain management: Transition from IV to PO as tolerated.    Antibiotics: Ancef x 24 hours.  Diet: Begin with clear fluids and progress diet as tolerated.   DVT prophylaxis: SCDs only. No chemical DVT ppx needed at discharge.  Imaging: XR Upright Lumbar PTDC -  ordered.  Labs: Hgb POD#1-3.  Bracing/Splinting: None.  Dressings: Keep Aquacel c/d/i x 7 days.  Drains: Document output per shift, will be discontinued at Orthopedic Surgery discretion.  Jauregui catheter: In place.   Physical Therapy/Occupational Therapy: Eval and treat.  Consults: Medicine, PT, OT.  Follow-up: Clinic with Dr. Goyal in 6 weeks with standing AP/Lat lumb x-rays needed.   Disposition: Pending progress with therapies, pain control on orals, and medical stability, anticipate discharge to home on POD #3-5.    Future Appointments   Date Time Provider Department [place]   3/7/2019  8:30 AM Vale Pedro Pt, PT URPT [place]   3/7/2019 11:00 AM Laurita Alejandre, ABDIAS UROT [place]   3/7/2019  1:00 PM Vale Pedro Pt, PT URPT [place]   4/19/2019 11:00 AM Santy Goyal MD [facility]       Patient discussed with Dr. Goyal.    Shahnaz Cota MD  Orthopaedic Surgery, PGY-1  [phone]    Please page me directly with any questions/concerns during regular weekday hours before 5pm. If there is no response, if it is a weekend, or if it is during evening hours then please page the orthopaedic surgery resident on call.

## 2019-03-08 NOTE — PROGRESS NOTES
[facility] Internal Medicine Progress Note            Interval History:   Record reviewed.  Seen with RN.  S/P Lumbar 4-5, L5-S1 Transforaminal Lumbar Interbody Fusion (Decompression and Instrumented Fusion) Lumbar 4-5, L5-S1 Smith Gramajo Osteotomy, Right Iliac Crest Autograft.  3/6.  Dr. Goyal.  .  250 ml cell saver.  Post op hypotension with recurrent lactic acid elevation attributed to hypovolemia.  Repeat fluid bolus 1 liter NS 10 PM for lactic acid again elevated 3.5. Remains on  ml/hr.  Adequate pain control on po dilaudid, prn IV dilaudid and valium.  Ambulated this AM in ralph without LH.    No CP, SOB, cough, nausea, reflux, abd pain or distention.  Passing  flatus.  BM this AM.  Voiding Ok.           Medications:   All medications reviewed today          Physical Exam:   Blood pressure 107/52, pulse 60, temperature 97.8  F (36.6  C), temperature source Oral, resp. rate 16, last menstrual period 02/03/2019, SpO2 98 %.    Intake/Output Summary (Last 24 hours) at 3/7/2019 1020  Last data filed at 3/7/2019 0600  Gross per 24 hour   Intake 4180 ml   Output 3360 ml   Net 820 ml       General:  Alert.   Appropriate.  Sitting at bedside.   No distress.  Off O2.     Heent:      Neck:    Skin:    Chest:  clear    Cardiac:  Reg without gallop, murmur.  No JVD.     Abdomen:  Non distended, soft, non-tender.  BS normal.     Extremities:  Perfused.  No edema, calf, posterior thigh tenderness to suggest DVT.     Neuro:            Data:     Results for orders placed or performed during the hospital encounter of 03/06/19 (from the past 24 hour(s))   Lactic acid whole blood   Result Value Ref Range    Lactic Acid 2.9 (H) 0.7 - 2.0 mmol/L   Lactic acid whole blood   Result Value Ref Range    Lactic Acid 3.5 (H) 0.7 - 2.0 mmol/L   Lactic acid whole blood   Result Value Ref Range    Lactic Acid 2.7 (H) 0.7 - 2.0 mmol/L   Lactic acid whole blood   Result Value Ref Range    Lactic Acid 2.2 (H) 0.7 - 2.0  mmol/L   Basic metabolic panel   Result Value Ref Range    Sodium 143 133 - 144 mmol/L    Potassium 4.0 3.4 - 5.3 mmol/L    Chloride 107 94 - 109 mmol/L    Carbon Dioxide 28 20 - 32 mmol/L    Anion Gap 8 3 - 14 mmol/L    Glucose 130 (H) 70 - 99 mg/dL    Urea Nitrogen 7 7 - 30 mg/dL    Creatinine 0.56 0.52 - 1.04 mg/dL    GFR Estimate >90 >60 mL/min/[1.73_m2]    GFR Estimate If Black >90 >60 mL/min/[1.73_m2]    Calcium 7.9 (L) 8.5 - 10.1 mg/dL   Magnesium   Result Value Ref Range    Magnesium 2.1 1.6 - 2.3 mg/dL   CBC with platelets   Result Value Ref Range    WBC 20.0 (H) 4.0 - 11.0 10e9/L    RBC Count 3.03 (L) 3.8 - 5.2 10e12/L    Hemoglobin 9.0 (L) 11.7 - 15.7 g/dL    Hematocrit 28.4 (L) 35.0 - 47.0 %    MCV 94 78 - 100 fl    MCH 29.7 26.5 - 33.0 pg    MCHC 31.7 31.5 - 36.5 g/dL    RDW 13.6 10.0 - 15.0 %    Platelet Count 153 150 - 450 10e9/L              Assessment and Plan:   1)  S/P Lumbar 4-5, L5-S1 Transforaminal Lumbar Interbody Fusion (Decompression and Instrumented Fusion) Lumbar 4-5, L5-S1 Smith Gramajo Osteotomy, Right Iliac Crest Autograft.  Adequate pain control with continued IV dilaudid requirement (may decrease with IV decadron effect).   Mobilizing.  2)  Acute blood loss anemia.  Adequate.  3)  Post op hypotension with lactic acid elevation attributed to decrease volume improved with IV fluids.     4)  Hypoxemia  2nd to sedation from residual effect of amesthesia and administered opiates.  Resolved.   5)  Anxiety disorder.  On propranolol.  Appears compensated.  6)  Leukocytosis - 2nd to stress reaction and decadron effect.     PLAN:  1)  Continue IV fluids.  Recheck lactic acid noon.  2)  Same po dilaudid and valium.  Add scheduled ES tylenol.  Patient to attempt to wean IV dilaudid.  3)  IS, bowel meds, mobilize, mechanical DVT prophylaxis.  4)  Trend labs.  Discontinue telemetry.  Decision when to discontinue decadron.  Monitor clinically.   Disposition Plan   Expected discharge in 2-3 days to  prior living arrangement once mobilizing safely, adequate pain control off IV meds, stable hemodynamics.      Entered: Michelet Padgett 03/08/2019, 8:58 AM              Attestation:  I have reviewed today's vital signs, notes, medications, labs and imaging.     Michelet Padgett MD

## 2019-03-08 NOTE — PLAN OF CARE
Oliviaisng  post op spine  S- Up freq in am, walked ralph per self  pt to BR per self  steady.  States still having pain radiating for back down to hips and legs sciatic pain.   Lido patches with minimal chg.  Ice freq   norco q3hr and joan w fair pain control   is able to do a lot of acitiy.    Face flushed in afternoon.  Sat ok  temp ok   pt very tiered in afternoon- sleeping freq.  LA 1.6 at noon.  A- worn out from activity in am.  Fairly good pain control     R- cont same plan  avoid IV pain dose if possible.  Cont IVF.  Activity as natalie   enc po as natalie.    HV had come out this am by accident.  SARITA draining small amt.

## 2019-03-09 ENCOUNTER — APPOINTMENT (OUTPATIENT)
Dept: PHYSICAL THERAPY | Facility: CLINIC | Age: 49
End: 2019-03-09
Attending: ORTHOPAEDIC SURGERY
Payer: COMMERCIAL

## 2019-03-09 LAB
ANION GAP SERPL CALCULATED.3IONS-SCNC: 6 MMOL/L (ref 3–14)
BUN SERPL-MCNC: 9 MG/DL (ref 7–30)
CALCIUM SERPL-MCNC: 7.6 MG/DL (ref 8.5–10.1)
CHLORIDE SERPL-SCNC: 107 MMOL/L (ref 94–109)
CO2 SERPL-SCNC: 29 MMOL/L (ref 20–32)
CREAT SERPL-MCNC: 0.61 MG/DL (ref 0.52–1.04)
ERYTHROCYTE [DISTWIDTH] IN BLOOD BY AUTOMATED COUNT: 13.6 % (ref 10–15)
GFR SERPL CREATININE-BSD FRML MDRD: >90 ML/MIN/{1.73_M2}
GLUCOSE SERPL-MCNC: 87 MG/DL (ref 70–99)
HCT VFR BLD AUTO: 26.9 % (ref 35–47)
HGB BLD-MCNC: 8.6 G/DL (ref 11.7–15.7)
MAGNESIUM SERPL-MCNC: 1.9 MG/DL (ref 1.6–2.3)
MCH RBC QN AUTO: 30.1 PG (ref 26.5–33)
MCHC RBC AUTO-ENTMCNC: 32 G/DL (ref 31.5–36.5)
MCV RBC AUTO: 94 FL (ref 78–100)
PLATELET # BLD AUTO: 148 10E9/L (ref 150–450)
POTASSIUM SERPL-SCNC: 3.5 MMOL/L (ref 3.4–5.3)
RBC # BLD AUTO: 2.86 10E12/L (ref 3.8–5.2)
SODIUM SERPL-SCNC: 142 MMOL/L (ref 133–144)
WBC # BLD AUTO: 14.2 10E9/L (ref 4–11)

## 2019-03-09 PROCEDURE — 97116 GAIT TRAINING THERAPY: CPT | Mod: GP

## 2019-03-09 PROCEDURE — 25000132 ZZH RX MED GY IP 250 OP 250 PS 637: Performed by: ORTHOPAEDIC SURGERY

## 2019-03-09 PROCEDURE — 99207 ZZC CDG-CUT & PASTE-POTENTIAL IMPACT ON LEVEL: CPT | Performed by: INTERNAL MEDICINE

## 2019-03-09 PROCEDURE — 25800030 ZZH RX IP 258 OP 636: Performed by: INTERNAL MEDICINE

## 2019-03-09 PROCEDURE — 12000001 ZZH R&B MED SURG/OB UMMC

## 2019-03-09 PROCEDURE — 80048 BASIC METABOLIC PNL TOTAL CA: CPT | Performed by: INTERNAL MEDICINE

## 2019-03-09 PROCEDURE — 36415 COLL VENOUS BLD VENIPUNCTURE: CPT | Performed by: INTERNAL MEDICINE

## 2019-03-09 PROCEDURE — 25000132 ZZH RX MED GY IP 250 OP 250 PS 637: Performed by: INTERNAL MEDICINE

## 2019-03-09 PROCEDURE — 25000132 ZZH RX MED GY IP 250 OP 250 PS 637: Performed by: PHYSICIAN ASSISTANT

## 2019-03-09 PROCEDURE — 99232 SBSQ HOSP IP/OBS MODERATE 35: CPT | Performed by: INTERNAL MEDICINE

## 2019-03-09 PROCEDURE — 85027 COMPLETE CBC AUTOMATED: CPT | Performed by: INTERNAL MEDICINE

## 2019-03-09 PROCEDURE — 83735 ASSAY OF MAGNESIUM: CPT | Performed by: INTERNAL MEDICINE

## 2019-03-09 RX ADMIN — ACETAMINOPHEN 1000 MG: 500 TABLET, FILM COATED ORAL at 17:37

## 2019-03-09 RX ADMIN — POLYETHYLENE GLYCOL 3350 17 G: 17 POWDER, FOR SOLUTION ORAL at 19:39

## 2019-03-09 RX ADMIN — HYDROMORPHONE HYDROCHLORIDE 4 MG: 2 TABLET ORAL at 06:11

## 2019-03-09 RX ADMIN — TRAZODONE HYDROCHLORIDE 50 MG: 50 TABLET ORAL at 22:24

## 2019-03-09 RX ADMIN — SENNOSIDES AND DOCUSATE SODIUM 1 TABLET: 8.6; 5 TABLET ORAL at 19:39

## 2019-03-09 RX ADMIN — HYDROMORPHONE HYDROCHLORIDE 4 MG: 2 TABLET ORAL at 19:47

## 2019-03-09 RX ADMIN — ACETAMINOPHEN 1000 MG: 500 TABLET, FILM COATED ORAL at 09:06

## 2019-03-09 RX ADMIN — VENLAFAXINE HYDROCHLORIDE 225 MG: 150 TABLET, EXTENDED RELEASE ORAL at 09:06

## 2019-03-09 RX ADMIN — GABAPENTIN 900 MG: 300 CAPSULE ORAL at 22:24

## 2019-03-09 RX ADMIN — HYDROMORPHONE HYDROCHLORIDE 4 MG: 2 TABLET ORAL at 09:24

## 2019-03-09 RX ADMIN — ACETAMINOPHEN 1000 MG: 500 TABLET, FILM COATED ORAL at 02:25

## 2019-03-09 RX ADMIN — SODIUM CHLORIDE, POTASSIUM CHLORIDE, SODIUM LACTATE AND CALCIUM CHLORIDE: 600; 310; 30; 20 INJECTION, SOLUTION INTRAVENOUS at 02:24

## 2019-03-09 RX ADMIN — HYDROMORPHONE HYDROCHLORIDE 4 MG: 2 TABLET ORAL at 22:24

## 2019-03-09 RX ADMIN — GABAPENTIN 300 MG: 300 CAPSULE ORAL at 15:14

## 2019-03-09 RX ADMIN — GABAPENTIN 300 MG: 300 CAPSULE ORAL at 09:07

## 2019-03-09 RX ADMIN — POLYETHYLENE GLYCOL 3350 17 G: 17 POWDER, FOR SOLUTION ORAL at 09:07

## 2019-03-09 RX ADMIN — HYDROMORPHONE HYDROCHLORIDE 4 MG: 2 TABLET ORAL at 15:19

## 2019-03-09 ASSESSMENT — ACTIVITIES OF DAILY LIVING (ADL)
ADLS_ACUITY_SCORE: 18
ADLS_ACUITY_SCORE: 17
ADLS_ACUITY_SCORE: 15

## 2019-03-09 NOTE — PLAN OF CARE
Patient has been up independently today, walking in ralph.  Gait is steady.  She voices looking forward to going home tomorrow.  SARITA drain continues to  collect fluid, 100 ml by noon.  She has used Dilaudid for pain control, and reports that it is effective.  She reported that she did not sleep last night, as she and her roommate enjoyed visiting and joking around.  She did fall asleep after eating a light lunch.  Currently up and ambulating in the ralph, again. Her goal has been to ambulate 4 times in the ralph, and she has surpassed that.

## 2019-03-09 NOTE — PROGRESS NOTES
[facility] Internal Medicine Progress Note            Interval History:   Record reviewed.  Discussed with RN.  S/P Lumbar 4-5, L5-S1 Transforaminal Lumbar Interbody Fusion (Decompression and Instrumented Fusion) Lumbar 4-5, L5-S1 Smith Gramajo Osteotomy, Right Iliac Crest Autograft. 3/6.  Dr. Goyal.  .  250 ml cell saver.  Plan per spine to leave drain in, monitor another day.    Adequate pain control with po dilaudid 4 mg. (off IV  dilaudid per MAR) and valium.  Ambulated this AM in ralph, up to BR without LH.    No CP, SOB, cough, nausea, reflux, abd pain or distention.  Passing  flatus.  Loose BM 3/8.  Voiding Ok.           Medications:   All medications reviewed today          Physical Exam:   Blood pressure 122/69, pulse 69, temperature 97.4  F (36.3  C), temperature source Oral, resp. rate 16, last menstrual period 02/03/2019, SpO2 99 %.    Intake/Output Summary (Last 24 hours) at 3/7/2019 1020  Last data filed at 3/7/2019 0600  Gross per 24 hour   Intake 4180 ml   Output 3360 ml   Net 820 ml       General:  Alert.   Appropriate.  Sitting at bedside.   No distress.  Off O2.     Heent:      Neck:    Skin:    Chest:  clear    Cardiac:  Reg without gallop, murmur.  No JVD.     Abdomen:  Non distended, soft, non-tender.  BS normal.     Extremities:  Perfused.  No edema, calf, posterior thigh tenderness to suggest DVT.     Neuro:            Data:     Results for orders placed or performed during the hospital encounter of 03/06/19 (from the past 24 hour(s))   XR Lumbar Spine 2/3 Views    Narrative    Exam: XR LUMBAR SPINE 2-3 VIEWS, 3/8/2019 10:10 AM    Indication: Post-op Thoracic/Lumbar Surgery    Comparison: CT 3/6/2019    Findings:   Frontal and lateral radiographs of the lumbar spine. 5 lumbar type  vertebral bodies are assumed for the purposes of this dictation.     Postsurgical changes of L4-S1 posterior fusion instrumentation with a  posterior surgical drain in place. No hardware  complication, fracture,  or periprosthetic lucency. Mild degenerative changes throughout the  spine. Nonobstructive bowel gas pattern.      Impression    Impression: Postsurgical changes of L4-S1 posterior fusion  instrumentation without complication.     LESLIE HASKINS MD   Lactic acid whole blood   Result Value Ref Range    Lactic Acid 1.6 0.7 - 2.0 mmol/L   Basic metabolic panel   Result Value Ref Range    Sodium 142 133 - 144 mmol/L    Potassium 3.5 3.4 - 5.3 mmol/L    Chloride 107 94 - 109 mmol/L    Carbon Dioxide 29 20 - 32 mmol/L    Anion Gap 6 3 - 14 mmol/L    Glucose 87 70 - 99 mg/dL    Urea Nitrogen 9 7 - 30 mg/dL    Creatinine 0.61 0.52 - 1.04 mg/dL    GFR Estimate >90 >60 mL/min/[1.73_m2]    GFR Estimate If Black >90 >60 mL/min/[1.73_m2]    Calcium 7.6 (L) 8.5 - 10.1 mg/dL   Magnesium   Result Value Ref Range    Magnesium 1.9 1.6 - 2.3 mg/dL   CBC with platelets   Result Value Ref Range    WBC 14.2 (H) 4.0 - 11.0 10e9/L    RBC Count 2.86 (L) 3.8 - 5.2 10e12/L    Hemoglobin 8.6 (L) 11.7 - 15.7 g/dL    Hematocrit 26.9 (L) 35.0 - 47.0 %    MCV 94 78 - 100 fl    MCH 30.1 26.5 - 33.0 pg    MCHC 32.0 31.5 - 36.5 g/dL    RDW 13.6 10.0 - 15.0 %    Platelet Count 148 (L) 150 - 450 10e9/L              Assessment and Plan:   1)  S/P Lumbar 4-5, L5-S1 Transforaminal Lumbar Interbody Fusion (Decompression and Instrumented Fusion) Lumbar 4-5, L5-S1 Smith Gramajo Osteotomy, Right Iliac Crest Autograft.  Adequate pain control.  Off IV opiates.  Mobilizing.  2)  Acute blood loss anemia.  Adequate.  3)  Post op hypotension with lactic acid elevation attributed to decrease volume resolved with IV fluids.     4)  Hypoxemia  2nd to sedation from residual effect of amesthesia and administered opiates.  Resolved.   5)  Anxiety disorder.  On propranolol.  Appears compensated.  6)  Leukocytosis - 2nd to stress reaction and decadron effect. Improved.     PLAN:  1)  SL IV.  2)  Same po dilaudid and valium, scheduled ES tylenol.  .  3)  IS, bowel meds, mobilize, mechanical DVT prophylaxis.  4)  Trend labs. .  Monitor clinically.   Disposition Plan   Expected discharge in 1 days to prior living arrangement once mobilizing safely, adequate pain control off IV meds, stable hemodynamics (if weather permits).     Entered: Michelet Padgett 03/09/2019, 8:39 AM              Attestation:  I have reviewed today's vital signs, notes, medications, labs and imaging.     Michelet Padgett MD

## 2019-03-09 NOTE — PLAN OF CARE
A&O x 4 VSS, LS clear bilat. Neuros & CMS intact. Pain at incision - managed with ICE and dilaudid. BS active LBM 3/8/19. Voiding independently without difficulty. Tolerating regular diet without issue. Denies CP or SOB. Dressing C/D/I. Up independently. SARITA is patent.     Discharge plan is home tomorrow.

## 2019-03-09 NOTE — PLAN OF CARE
VSS and afebrile. Sats 99% on RA  Up with SBA and used bathroom. Voiding urine well. No postural dizziness/LH  Pain well controlled : scheduled Neurontin & tylenol, Dilaudid  PO 4mg 3-4hrs, ice pack to back. Valium 5mg at bedtime.  IVF infusing at 125ml/hr. Lactic acid 1.6.  BP stable.  Tolerates regular diet well.   Dressing to back CDI. SARITA output 30ml/shift.  No change in CMS/Neuros .  No N/T.  Will continue to monitor.

## 2019-03-09 NOTE — PLAN OF CARE
VSS, afebrile, A&Ox4, on RA, surgical pain is tolerable with prn oral dilaudid and scheduled tylenol, denies nausea, dizziness, SOB or CP, comfortably resting in bed and sleeping most of the shift, repositions self, ice to incision, up with SBA and walker, voiding in BR, no BM this shift, dressing-CDI, CMS- intact, no deficits noted, strong motor strength, SARITA has scant output, PIV infusing, pleasant and cooperative, able to make needs known, will continue to monitor and assist. Most likely discharge home with assist today.

## 2019-03-09 NOTE — PLAN OF CARE
Discharge Planner PT   Patient plan for discharge: home with assist  Current status: At this time all goals met. Independent with supine<>sit transfer and independent with transfers and ambulation without an assistive device. Declined trial of stairs but does not need to complete at home.  Barriers to return to prior living situation: No PT barriers  Recommendations for discharge: Home with assist  Rationale for recommendations: All goals met    Physical Therapy Discharge Summary    Reason for therapy discharge:    All goals and outcomes met, no further needs identified.    Progress towards therapy goal(s). See goals on Care Plan in [facility] electronic health record for goal details.  Goals met    Therapy recommendation(s):    No further therapy is recommended.           Entered by: Esmer Guevara 03/09/2019 11:04 AM

## 2019-03-10 ENCOUNTER — PATIENT OUTREACH (OUTPATIENT)
Dept: CARE COORDINATION | Facility: CLINIC | Age: 49
End: 2019-03-10

## 2019-03-10 VITALS
RESPIRATION RATE: 16 BRPM | SYSTOLIC BLOOD PRESSURE: 122 MMHG | HEART RATE: 79 BPM | DIASTOLIC BLOOD PRESSURE: 69 MMHG | TEMPERATURE: 96.7 F | OXYGEN SATURATION: 98 %

## 2019-03-10 LAB
ANION GAP SERPL CALCULATED.3IONS-SCNC: 9 MMOL/L (ref 3–14)
BUN SERPL-MCNC: 8 MG/DL (ref 7–30)
CALCIUM SERPL-MCNC: 7.6 MG/DL (ref 8.5–10.1)
CHLORIDE SERPL-SCNC: 107 MMOL/L (ref 94–109)
CO2 SERPL-SCNC: 27 MMOL/L (ref 20–32)
CREAT SERPL-MCNC: 0.61 MG/DL (ref 0.52–1.04)
ERYTHROCYTE [DISTWIDTH] IN BLOOD BY AUTOMATED COUNT: 13.3 % (ref 10–15)
GFR SERPL CREATININE-BSD FRML MDRD: >90 ML/MIN/{1.73_M2}
GLUCOSE SERPL-MCNC: 92 MG/DL (ref 70–99)
HCT VFR BLD AUTO: 27.6 % (ref 35–47)
HGB BLD-MCNC: 9 G/DL (ref 11.7–15.7)
MAGNESIUM SERPL-MCNC: 1.9 MG/DL (ref 1.6–2.3)
MCH RBC QN AUTO: 30.3 PG (ref 26.5–33)
MCHC RBC AUTO-ENTMCNC: 32.6 G/DL (ref 31.5–36.5)
MCV RBC AUTO: 93 FL (ref 78–100)
PLATELET # BLD AUTO: 153 10E9/L (ref 150–450)
POTASSIUM SERPL-SCNC: 3.8 MMOL/L (ref 3.4–5.3)
RBC # BLD AUTO: 2.97 10E12/L (ref 3.8–5.2)
SODIUM SERPL-SCNC: 143 MMOL/L (ref 133–144)
WBC # BLD AUTO: 10.6 10E9/L (ref 4–11)

## 2019-03-10 PROCEDURE — 80048 BASIC METABOLIC PNL TOTAL CA: CPT | Performed by: INTERNAL MEDICINE

## 2019-03-10 PROCEDURE — 83735 ASSAY OF MAGNESIUM: CPT | Performed by: INTERNAL MEDICINE

## 2019-03-10 PROCEDURE — 36415 COLL VENOUS BLD VENIPUNCTURE: CPT | Performed by: INTERNAL MEDICINE

## 2019-03-10 PROCEDURE — 99232 SBSQ HOSP IP/OBS MODERATE 35: CPT | Performed by: INTERNAL MEDICINE

## 2019-03-10 PROCEDURE — 25000132 ZZH RX MED GY IP 250 OP 250 PS 637: Performed by: INTERNAL MEDICINE

## 2019-03-10 PROCEDURE — 85027 COMPLETE CBC AUTOMATED: CPT | Performed by: INTERNAL MEDICINE

## 2019-03-10 PROCEDURE — 99207 ZZC CDG-CUT & PASTE-POTENTIAL IMPACT ON LEVEL: CPT | Performed by: INTERNAL MEDICINE

## 2019-03-10 PROCEDURE — 25000132 ZZH RX MED GY IP 250 OP 250 PS 637: Performed by: PHYSICIAN ASSISTANT

## 2019-03-10 PROCEDURE — 25000132 ZZH RX MED GY IP 250 OP 250 PS 637: Performed by: ORTHOPAEDIC SURGERY

## 2019-03-10 RX ORDER — BISACODYL 10 MG
10 SUPPOSITORY, RECTAL RECTAL DAILY PRN
Qty: 2 SUPPOSITORY | Refills: 0 | Status: SHIPPED | OUTPATIENT
Start: 2019-03-10

## 2019-03-10 RX ORDER — GABAPENTIN 300 MG/1
300 CAPSULE ORAL EVERY MORNING
COMMUNITY
Start: 2019-03-11

## 2019-03-10 RX ORDER — GABAPENTIN 300 MG/1
300 CAPSULE ORAL
COMMUNITY
Start: 2019-03-10

## 2019-03-10 RX ADMIN — ACETAMINOPHEN 1000 MG: 500 TABLET, FILM COATED ORAL at 08:32

## 2019-03-10 RX ADMIN — HYDROMORPHONE HYDROCHLORIDE 4 MG: 2 TABLET ORAL at 05:34

## 2019-03-10 RX ADMIN — ACETAMINOPHEN 1000 MG: 500 TABLET, FILM COATED ORAL at 01:27

## 2019-03-10 RX ADMIN — HYDROMORPHONE HYDROCHLORIDE 4 MG: 2 TABLET ORAL at 08:31

## 2019-03-10 RX ADMIN — SENNOSIDES AND DOCUSATE SODIUM 1 TABLET: 8.6; 5 TABLET ORAL at 08:32

## 2019-03-10 RX ADMIN — GABAPENTIN 300 MG: 300 CAPSULE ORAL at 08:31

## 2019-03-10 RX ADMIN — VENLAFAXINE HYDROCHLORIDE 225 MG: 150 TABLET, EXTENDED RELEASE ORAL at 08:31

## 2019-03-10 RX ADMIN — POLYETHYLENE GLYCOL 3350 17 G: 17 POWDER, FOR SOLUTION ORAL at 08:32

## 2019-03-10 RX ADMIN — HYDROMORPHONE HYDROCHLORIDE 4 MG: 2 TABLET ORAL at 01:27

## 2019-03-10 ASSESSMENT — ACTIVITIES OF DAILY LIVING (ADL)
ADLS_ACUITY_SCORE: 15

## 2019-03-10 NOTE — PLAN OF CARE
VSS, afebrile, A&Ox4, on RA, surgical pain is tolerable with prn oral dilaudid and scheduled tylenol, denies nausea, dizziness, SOB or CP, comfortably resting in bed and sleeping most of the shift, repositions self, ice to incision, up with SBA and walker, voiding in BR, no BM this shift, dressing-CDI, CMS- intact, no deficits noted, strong motor strength, SARITA has scant output, no PIV access, , PCDs on, pleasant and cooperative, able to make needs known, will continue to monitor and assist. Most likely discharge home with assist today.

## 2019-03-10 NOTE — PROGRESS NOTES
Orthopaedic Surgery Progress Note 03/10/2019    S: Pain well controlled on Oral meds. FLAVIAO. Doing very well. Has some pain in her back around incision but much better overall compared to preop. She is ready for home today.     Lactic acid trend: 3.4 -> 3.5 -> 2.9 -> 3.5 -> 2.7->2.2->1.6      O:  Temp: 96.7  F (35.9  C) Temp src: Oral BP: 122/69 Pulse: 79 Heart Rate: 90 Resp: 16 SpO2: 98 % O2 Device: None (Room air)      Exam:  Gen: No acute distress, resting comfortably in bed.  Resp: Non-labored breathing  CV: wwp  MSK:  Spine:  - Dressings c/d/i  - Drain patent with serosanguinous output  - DP pulses 2+ bilaterally, feet wwp bilaterally  Lumbar Spine:   Motor -    L2-3: Hip flexion R 5/5  And L 5/5 strength         L3/4:  Knee extension R 5/5 and L 5/5 strength        L4/5:  Foot dorsiflexion R 5/5 L 5/5 and      EHL dorsiflexion R 5/5 L 5/5 strength        S1:  Plantarflexion/Peroneal Muscles  R 5/5 and L 5/5 strength   Sensation: intact to light touch L3-S1 distribution BLE       Drain output:  N/A    Recent Labs   Lab 03/10/19  0533 03/09/19  0601 03/08/19  0829   WBC 10.6 14.2* 20.0*   HGB 9.0* 8.6* 9.0*    148* 153         Assessment: Negrita Robledo is a 48 year old female s/p L4-S1 TLIF, L4-S1 SPO and Right iliac crest autograft on 3/6/2019 with Dr. Goyal. Doing well.    Ortho Primary  Activity: Up with assist until independent. No excessive bending or twisting. No lifting >10 lbs x 6 weeks.   Weight bearing status: WBAT.  Pain management: Transition from IV to PO as tolerated.    Antibiotics: Ancef x 24 hours.  Diet: Begin with clear fluids and progress diet as tolerated.   DVT prophylaxis: SCDs only. No chemical DVT ppx needed at discharge.  Imaging: XR Upright Lumbar PTDC - completed  Labs: Hgb POD#1-3.  Bracing/Splinting: None.  Dressings: Keep Aquacel c/d/i x 7 days.  Drains: removed  Physical Therapy/Occupational Therapy: Eval and treat.  Consults: Medicine, PT, OT.  Follow-up: Clinic with  Dr. Goyal in 6 weeks with standing AP/Lat lumb x-rays needed.   Disposition: Pending progress with therapies, pain control on orals, and medical stability, anticipate discharge to home TODAY    Future Appointments   Date Time Provider Department Center   3/7/2019  8:30 AM Vale Pedro Pt, PT [facility] [place]   3/7/2019 11:00 AM Laurita Alejandre, OT [facility] [place]   3/7/2019  1:00 PM Vale Pedro Pt, PT [facility] [place]   4/19/2019 11:00 AM Santy Goyal MD [facility]       Patient discussed with Dr. Goyal.    Lucas Squires MD   Orthopaedic Surgery Resident, PGY-4  Pager: [phone]

## 2019-03-10 NOTE — PROGRESS NOTES
[facility] [place] Internal Medicine Progress Note            Interval History:   Record reviewed.  Seen with RN.  Discussed with spine.  S/P Lumbar 4-5, L5-S1 Transforaminal Lumbar Interbody Fusion (Decompression and Instrumented Fusion) Lumbar 4-5, L5-S1 Smith Gramajo Osteotomy, Right Iliac Crest Autograft. 3/6.  Dr. Goyal.  .  250 ml cell saver.  Drain out this AM by spine.  OK for discharge home.    Adequate pain control with po dilaudid 4 mg. (off IV  dilaudid) and valium.  Ambulated this AM in ralph, up to BR without LH (except fleeting with shower).    No CP, SOB, cough, nausea, reflux, abd pain or distention.  Passing  flatus.  Loose BM 3/8.  Small Bm this AM. Voiding Ok.           Medications:   All medications reviewed today          Physical Exam:   Blood pressure 122/69, pulse 79, temperature 96.7  F (35.9  C), temperature source Oral, resp. rate 16, last menstrual period 02/03/2019, SpO2 98 %.    Intake/Output Summary (Last 24 hours) at 3/7/2019 1020  Last data filed at 3/7/2019 0600  Gross per 24 hour   Intake 4180 ml   Output 3360 ml   Net 820 ml       General:  Alert.   Appropriate.  Sitting at bedside.   No distress.  Off O2.     Heent:      Neck:    Skin:    Chest:  clear    Cardiac:  Reg without gallop, murmur.  No JVD.     Abdomen:  Non distended, soft, non-tender.  BS normal.     Extremities:  Perfused.  No edema, calf, posterior thigh tenderness to suggest DVT.     Neuro:            Data:     Results for orders placed or performed during the hospital encounter of 03/06/19 (from the past 24 hour(s))   Basic metabolic panel   Result Value Ref Range    Sodium 143 133 - 144 mmol/L    Potassium 3.8 3.4 - 5.3 mmol/L    Chloride 107 94 - 109 mmol/L    Carbon Dioxide 27 20 - 32 mmol/L    Anion Gap 9 3 - 14 mmol/L    Glucose 92 70 - 99 mg/dL    Urea Nitrogen 8 7 - 30 mg/dL    Creatinine 0.61 0.52 - 1.04 mg/dL    GFR Estimate >90 >60 mL/min/[1.73_m2]    GFR Estimate If Black >90 >60  mL/min/[1.73_m2]    Calcium 7.6 (L) 8.5 - 10.1 mg/dL   Magnesium   Result Value Ref Range    Magnesium 1.9 1.6 - 2.3 mg/dL   CBC with platelets   Result Value Ref Range    WBC 10.6 4.0 - 11.0 10e9/L    RBC Count 2.97 (L) 3.8 - 5.2 10e12/L    Hemoglobin 9.0 (L) 11.7 - 15.7 g/dL    Hematocrit 27.6 (L) 35.0 - 47.0 %    MCV 93 78 - 100 fl    MCH 30.3 26.5 - 33.0 pg    MCHC 32.6 31.5 - 36.5 g/dL    RDW 13.3 10.0 - 15.0 %    Platelet Count 153 150 - 450 10e9/L              Assessment and Plan:   1)  S/P Lumbar 4-5, L5-S1 Transforaminal Lumbar Interbody Fusion (Decompression and Instrumented Fusion) Lumbar 4-5, L5-S1 Smith Gramajo Osteotomy, Right Iliac Crest Autograft.  Adequate pain control.   Mobilizing.  2)  Acute blood loss anemia.  Adequate. Stable.   3)  Post op hypotension with lactic acid elevation attributed to decrease volume resolved with IV fluids.     4)  Hypoxemia  2nd to sedation from residual effect of amesthesia and administered opiates.  Resolved.   5)  Anxiety disorder.  On propranolol.  Appears compensated.  6)  Leukocytosis - 2nd to stress reaction and decadron effect. Resolved.     PLAN:  1)  Clinically stable for discharge home - neighbor, an RN to look in oin her as well as her boyfriend.  2)  Meds, orders reviewed.  Same neurontin, opiates, tylenol.  Supp if 3 days and no BM.  3)  Suppository if 3 days and no BM Primary MD appointment later this week if able - review meds, check clinically, recheck BMP, CBC. Continue use of incentive spirometer.            Disposition Plan   Expected discharge today to home.      Entered: Michelet Padgett 03/10/2019, 9:19 AM              Attestation:  I have reviewed today's vital signs, notes, medications, labs and imaging.     Michelet Padgett MD

## 2019-03-11 NOTE — PROGRESS NOTES
[facility]: Post-Discharge Note  SITUATION                                                      Admission:    Admission Date: 03/06/19   Reason for Admission: Degenerative Spondylolisthesis Primary  Discharge:   Discharge Date: 03/10/19  Discharge Diagnosis: Degenerative Spondylolisthesis Primary  Discharge Service: Orthopedics     BACKGROUND                                                      BRIEF HISTORY:  Negrita Robledo is a 48-year-old female with a history of low back pain, and bilateral L5 radicular symptoms which claudicate with ambulation.  She reports that her symptoms are secondary to lifting a heavy bag of salt approximately 15 years ago.  She feels that her back pain, weakness, numbness/tingling has progressed over the subsequent years.  She finds herself tripping over her right foot, and describes difficulty clearing the ground with her toes when she walks.  Additionally, her numbness/tingling, and pain awaken her from sleep. She tried extensive conservative management without relief. Given her radiologic findings, and physical exam, Dr. Goyal discussed the indications for surgical intervention, as well as its risks, benefits, and alternatives.  Patient acknowledged understanding, and elected to proceed with surgical intervention.     HOSPITAL COURSE:    Surgery was uncomplicated. Negrita Robledo has done well post-operatively. Medicine was consulted post operatively to aid in management of medical comorbidities.  Patient had an elevated lactic acid in the perioperative period attributed to decreased volume which was treated with fluid boluses. See final recommendations below. The patient received routine nursing cares and is medically stable. Vital signs are stable. The patient is tolerating a regular diet without GI distress/nausea or vomiting. Voiding spontaneously. All PT/OT goals have been met for safe mobility. Pain is now controlled on oral medications which will be  available on discharge. Stool softeners have been used while taking pain medications to help prevent constipation. Negrita Robledo is deemed medically safe to discharge.     ASSESSMENT      Discharge Assessment  Patient reports symptoms are: Unchanged  Does patient know what their new medications are for?: Yes  Does patient have a follow-up appointment scheduled?: Yes  Does patient have any other questions or concerns?: No    Post-op  Did the patient have surgery or a procedure: Yes  Drainage: No  Bleeding: none  Fever: No  Chills: No  Redness: No  Warmth: No  Swelling: No  Eating & Drinking: eating and drinking without complaints/concerns  PO Intake: regular diet  Bowel Function: normal  Urinary Status: voiding without complaint/concerns    PLAN                                                      Outpatient Plan:      PLAN:  1)  Clinically stable for discharge home - neighbor, an RN to look in on her as well as her boyfriend.  2)  Meds, orders reviewed.  Same neurontin, opiates, tylenol.  Supp if 3 days and no BM.  3)  Suppository if 3 days and no BM Primary MD appointment later this week if able - review meds, check clinically, recheck BMP, CBC. Continue use of incentive spirometer.    Future Appointments   Date Time Provider Department Center   4/19/2019 11:00 AM Santy Goyal MD [facility]           Julisa Ha CMA

## 2019-04-03 ENCOUNTER — TELEPHONE (OUTPATIENT)
Dept: ORTHOPEDICS | Facility: CLINIC | Age: 49
End: 2019-04-03

## 2019-04-03 DIAGNOSIS — Z98.890 S/P SPINAL SURGERY: Primary | ICD-10-CM

## 2019-04-03 NOTE — TELEPHONE ENCOUNTER
[facility] Call Center    Phone Message    May a detailed message be left on voicemail: yes    Reason for Call: Medication Refill Request    Has the patient contacted the pharmacy for the refill? Yes   Name of medication being requested: Dilaudid  Provider who prescribed the medication: Dr. Santy Goyal  Pharmacy: Mail to pt  Date medication is needed: ASAP         Action Taken: Message routed to:  [facility] ([facility] Ortho

## 2019-04-04 RX ORDER — HYDROMORPHONE HYDROCHLORIDE 2 MG/1
2 TABLET ORAL EVERY 6 HOURS PRN
Qty: 30 TABLET | Refills: 0 | Status: SHIPPED | OUTPATIENT
Start: 2019-04-04 | End: 2019-04-07

## 2019-04-04 NOTE — TELEPHONE ENCOUNTER
Anushka was phoned by RN.  She underwent L4-5 and L5-S1 TLIF on 3/6/19, was given #56 Dilaudid 4mg tablets on 3/8/19, states she is presently taking 4mg dilaudid in morning and evening, 2mg during the day between once and four times.  We discussed trying to wean from opioids by the 6 week visit, and the Rx will be for 2mg tablets.  Pt agreed to the plan.  Pt is also taking 900mg gabapentin at HS, 300mg AM and noon.  Dilaudid Rx mailed to pt's home.  Lashanda Nolen RN

## 2019-04-18 DIAGNOSIS — M54.50 LUMBAR PAIN: Primary | ICD-10-CM

## 2019-04-19 ENCOUNTER — ANCILLARY PROCEDURE (OUTPATIENT)
Dept: GENERAL RADIOLOGY | Facility: CLINIC | Age: 49
End: 2019-04-19
Attending: ORTHOPAEDIC SURGERY
Payer: COMMERCIAL

## 2019-04-19 ENCOUNTER — OFFICE VISIT (OUTPATIENT)
Dept: ORTHOPEDICS | Facility: CLINIC | Age: 49
End: 2019-04-19
Payer: COMMERCIAL

## 2019-04-19 VITALS — HEIGHT: 67 IN | BODY MASS INDEX: 29.82 KG/M2 | WEIGHT: 190 LBS

## 2019-04-19 DIAGNOSIS — Z98.890 STATUS POST LUMBAR SPINE SURGERY FOR DECOMPRESSION OF SPINAL CORD: Primary | ICD-10-CM

## 2019-04-19 RX ORDER — METHYLPREDNISOLONE 4 MG
TABLET, DOSE PACK ORAL
Qty: 21 TABLET | Refills: 0 | Status: SHIPPED | OUTPATIENT
Start: 2019-04-19

## 2019-04-19 RX ORDER — GABAPENTIN 300 MG/1
300 CAPSULE ORAL 3 TIMES DAILY
Qty: 150 CAPSULE | Refills: 0 | Status: SHIPPED | OUTPATIENT
Start: 2019-04-19 | End: 2019-05-08

## 2019-04-19 ASSESSMENT — ENCOUNTER SYMPTOMS
SEIZURES: 0
SPEECH CHANGE: 0
WEIGHT LOSS: 0
WEIGHT GAIN: 0
DISTURBANCES IN COORDINATION: 0
ABDOMINAL PAIN: 0
NAUSEA: 1
INCREASED ENERGY: 1
DIARRHEA: 0
DIZZINESS: 1
HALLUCINATIONS: 0
VOMITING: 0
LOSS OF CONSCIOUSNESS: 0
NIGHT SWEATS: 1
HEARTBURN: 0
BLOATING: 1
FEVER: 0
DECREASED APPETITE: 1
TINGLING: 1
FATIGUE: 1
ALTERED TEMPERATURE REGULATION: 0
MEMORY LOSS: 1
TREMORS: 0
HEADACHES: 1
BLOOD IN STOOL: 0
WEAKNESS: 1
PARALYSIS: 0
CONSTIPATION: 1
POLYDIPSIA: 1
NUMBNESS: 1
RECTAL PAIN: 0

## 2019-04-19 ASSESSMENT — MIFFLIN-ST. JEOR: SCORE: 1519.46

## 2019-04-19 NOTE — LETTER
4/19/2019       RE: Negrita Robledo  [address] [address]     Dear Colleague,    Thank you for referring your patient, Negrita Robledo, to the [facility]. Please see a copy of my visit note below.    Post-Operative Return Visit Within 90-days    Surgery: TLIF L4-5, L5-S1, SPO, right iliac crest autograph 3/6/19    Subjective:  Negrita Robledo is a 48 year old female who underwent the above mentioned procedure.  She returns to clinic today, now 6 weeks since the operation.  Until this past week, she actually feels that she was doing quite well.  Her bilateral L5 radicular pain had largely resolved. Her walking and standing endurance was better than compared to prior to surgery.  She had largely weaned off of narcotic pain medication.  However this week, without traumatic or aggravating factor, she has had return of L5 radicular pain, primarily right side.  She also finds that her walking and standing endurance is more comparable to where she was at the time of surgery.  She has not had any narcotic pain medication for 2 days.  Since surgery she has kept her gabapentin dose at 900 mg at bedtime only.  She has no concerns about wound healing.    Musculoskeletal Exam:   Incision is well-healed.  No surrounding erythema.  No tenderness to palpation.     Lumbar Spine:    L2-3: Hip flexion 5/5 R and 5/5 L strength          L3/4:  Knee extension R 5/5 and L 5/5 strength but with pain at surgical site         L4/5:  Foot dorsiflexion R 4/5 L 5/5 and       EHL dorsiflexion R 4/5 L 4/5 strength         S1:  Plantarflexion/Peroneal Muscles  R 5/5 and L 5/5 strength    Sensation: intact to light touch L3-S1 distribution BLE    Imaging:  XR lumbar spin AP and lateral 4/19/19 with stable position of implants. Maintained alignment.    Assessment and Plan:  Overall progressing appropriately after the above surgery. Recommend trial of  change in gabapentin dosing to 300 mg morning and lunch, and continue 900mg at night.  We will also start her on a Medrol dose pack.  We will refer her to physical therapy.  We will provide her with a work note today so that she may return to seated work for up to 4 hours/day.  We will have her return to clinic in 3 weeks time for repeat evaluation.    This patient was seen and discussed with Dr. Goyal who is in agreement with this plan.    Estrellita Knox, PGY4.    Attending MD (Dr. Santy Goyal) :  I reviewed and verified the history and physical exam of the patient and discussed the patient's management with the other clinical providers involved in this patient's care including any involved residents or physicians assistants. I reviewed the above note and agree with the documented findings and plan of care, which were communicated to the patient.      Santy Goyal MD

## 2019-04-19 NOTE — PROGRESS NOTES
Post-Operative Return Visit Within 90-days    Surgery: TLIF L4-5, L5-S1, SPO, right iliac crest autograph 3/6/19    Subjective:  Negrita Robledo is a 48 year old female who underwent the above mentioned procedure.  She returns to clinic today, now 6 weeks since the operation.  Until this past week, she actually feels that she was doing quite well.  Her bilateral L5 radicular pain had largely resolved. Her walking and standing endurance was better than compared to prior to surgery.  She had largely weaned off of narcotic pain medication.  However this week, without traumatic or aggravating factor, she has had return of L5 radicular pain, primarily right side.  She also finds that her walking and standing endurance is more comparable to where she was at the time of surgery.  She has not had any narcotic pain medication for 2 days.  Since surgery she has kept her gabapentin dose at 900 mg at bedtime only.  She has no concerns about wound healing.    Musculoskeletal Exam:   Incision is well-healed.  No surrounding erythema.  No tenderness to palpation.     Lumbar Spine:    L2-3: Hip flexion 5/5 R and 5/5 L strength          L3/4:  Knee extension R 5/5 and L 5/5 strength but with pain at surgical site         L4/5:  Foot dorsiflexion R 4/5 L 5/5 and       EHL dorsiflexion R 4/5 L 4/5 strength         S1:  Plantarflexion/Peroneal Muscles  R 5/5 and L 5/5 strength    Sensation: intact to light touch L3-S1 distribution BLE    Imaging:  XR lumbar spin AP and lateral 4/19/19 with stable position of implants. Maintained alignment.    Assessment and Plan:  Overall progressing appropriately after the above surgery. Recommend trial of change in gabapentin dosing to 300 mg morning and lunch, and continue 900mg at night.  We will also start her on a Medrol dose pack.  We will refer her to physical therapy.  We will provide her with a work note today so that she may return to seated work for up to 4 hours/day.  We will have her  return to clinic in 3 weeks time for repeat evaluation.    This patient was seen and discussed with Dr. Goyal who is in agreement with this plan.    Estrellita Knox, PGY4.    Attending MD (Dr. Santy Goyal) :  I reviewed and verified the history and physical exam of the patient and discussed the patient's management with the other clinical providers involved in this patient's care including any involved residents or physicians assistants. I reviewed the above note and agree with the documented findings and plan of care, which were communicated to the patient.      Santy Goyal MD      Answers for HPI/ROS submitted by the patient on 4/19/2019   General Symptoms: Yes  Skin Symptoms: No  HENT Symptoms: No  EYE SYMPTOMS: No  HEART SYMPTOMS: No  LUNG SYMPTOMS: No  INTESTINAL SYMPTOMS: Yes  URINARY SYMPTOMS: No  GYNECOLOGIC SYMPTOMS: No  BREAST SYMPTOMS: No  SKELETAL SYMPTOMS: No  BLOOD SYMPTOMS: No  NERVOUS SYSTEM SYMPTOMS: Yes  MENTAL HEALTH SYMPTOMS: No  Fever: No  Loss of appetite: Yes  Weight loss: No  Weight gain: No  Fatigue: Yes  Night sweats: Yes  Excessive thirst: Yes  Feeling hot or cold when others believe the temperature is normal: No  Loss of height: No  Post-operative complications: No  Surgical site pain: Yes  Hallucinations: No  Change in or Loss of Energy: Yes  Hyperactivity: No  Confusion: Yes  Heart burn or indigestion: No  Nausea: Yes  Vomiting: No  Abdominal pain: No  Bloating: Yes  Constipation: Yes  Diarrhea: No  Blood in stool: No  Black stools: No  Rectal or Anal pain: No  Trouble with coordination: No  Dizziness or trouble with balance: Yes  Fainting or black-out spells: No  Memory loss: Yes  Headache: Yes  Seizures: No  Speech problems: No  Tingling: Yes  Tremor: No  Weakness: Yes  Difficulty walking: Yes  Paralysis: No  Numbness: Yes

## 2019-04-19 NOTE — LETTER
Return to Work  2019     Seen today: yes    Patient:  Negrita Robledo  :   1970  MRN:     [id number]  Physician: GIANFRANCO MYERS    Negrita Robledo may return to work on Date: 19 with the following restrictions:    Seated work only for up to 4 hours per day. Must be allowed a break every hour.    No lifting >10 lbs. No twisting or bending of spine.      Electronically signed by Gianfranco Myers MD

## 2019-04-19 NOTE — NURSING NOTE
"Reason For Visit:   Chief Complaint   Patient presents with     Surgical Followup     6 week POP DOS: 3/6/19 L4-5 and L5-S1       Primary MD: Reta Castaneda  Ref. MD: Jay Jay   Date of surgery: 3/6/19  Type of surgery: Dr. Goyal .  Smoker: No  Request smoking cessation information: No    Ht 1.702 m (5' 7\")   Wt 86.2 kg (190 lb)   BMI 29.76 kg/m      Pain Assessment  Patient Currently in Pain: Yes  0-10 Pain Scale: 8  Primary Pain Location: Back  Pain Descriptors: Radiating(mainly right but still has some siatic symptoms down left )    Oswestry (DOMO) Questionnaire    OSWESTRY DISABILITY INDEX 4/19/2019   Count 8   Sum 22   Oswestry Score (%) 55            Neck Disability Index (NDI) Questionnaire    No flowsheet data found.                Promis 10 Assessment    PROMIS 10 4/19/2019   In general, would you say your health is: Fair   In general, would you say your quality of life is: Poor   In general, how would you rate your physical health? Poor   In general, how would you rate your mental health, including your mood and your ability to think? Fair   In general, how would you rate your satisfaction with your social activities and relationships? Fair   In general, please rate how well you carry out your usual social activities and roles Fair   To what extent are you able to carry out your everyday physical activities such as walking, climbing stairs, carrying groceries, or moving a chair? A little   How often have you been bothered by emotional problems such as feeling anxious, depressed or irritable? Sometimes   How would you rate your fatigue on average? Severe   How would you rate your pain on average?   0 = No Pain  to  10 = Worst Imaginable Pain 8   In general, would you say your health is: 2   In general, would you say your quality of life is: 1   In general, how would you rate your physical health? 1   In general, how would you rate your mental health, including your mood and your ability to think? 2   In " general, how would you rate your satisfaction with your social activities and relationships? 2   In general, please rate how well you carry out your usual social activities and roles. (This includes activities at home, at work and in your community, and responsibilities as a parent, child, spouse, employee, friend, etc.) 2   To what extent are you able to carry out your everyday physical activities such as walking, climbing stairs, carrying groceries, or moving a chair? 2   In the past 7 days, how often have you been bothered by emotional problems such as feeling anxious, depressed, or irritable? 3   In the past 7 days, how would you rate your fatigue on average? 4   In the past 7 days, how would you rate your pain on average, where 0 means no pain, and 10 means worst imaginable pain? 8   Global Mental Health Score 8   Global Physical Health Score 7   PROMIS TOTAL - SUBSCORES 15   Some recent data might be hidden                Yang Kapoor ATC

## 2019-05-08 DIAGNOSIS — Z98.890 STATUS POST LUMBAR SPINE SURGERY FOR DECOMPRESSION OF SPINAL CORD: ICD-10-CM

## 2019-05-08 RX ORDER — GABAPENTIN 300 MG/1
CAPSULE ORAL
Qty: 150 CAPSULE | Refills: 0 | Status: SHIPPED | OUTPATIENT
Start: 2019-05-08

## 2019-05-10 ENCOUNTER — OFFICE VISIT (OUTPATIENT)
Dept: ORTHOPEDICS | Facility: CLINIC | Age: 49
End: 2019-05-10
Payer: COMMERCIAL

## 2019-05-10 DIAGNOSIS — M43.10 DEGENERATIVE SPONDYLOLISTHESIS: Primary | ICD-10-CM

## 2019-05-10 NOTE — LETTER
Return to Work  May 10, 2019     Seen today: yes    Patient:  Negrita Robledo  :   1970  MRN:     [id number]  Physician: GIANFRANCO MYERS    Negrita Robledo may return to work on Date: 19.      The next clinic appointment is scheduled for 3 months from now     Patient limitations:  May return to work without restrictions       Electronically signed by Gianfranco Myers MD

## 2019-05-10 NOTE — PROGRESS NOTES
Spine Surgery Return Clinic Visit      Chief Complaint:   Follow Up (DOS 3/6/2019 Lumbar 4-5, L5-S1 Transforaminal Lumbar Interbody Fusion (Decompression and Instrumented Fusion) Lumbar 4-5, L5-S1 Smith Gramajo Osteotomy, Right Iliac Crest Autograft)      Interval HPI:  Symptom Profile Including: location of symptoms, onset, severity, exacerbating/alleviating factors, previous treatments:        Negrita Robledo is a 48 year old female who returns today status post 2 level lumbar interbody fusion.  She is doing quite well and she says the surgery is helped her quite a bit.  At my last visit with her a month ago she was having some intermittent radicular symptoms but these seem to have resolved for her and she has minimal back pain today.  She thinks surgery has helped her substantially.            Past Medical History:     Past Medical History:   Diagnosis Date     Allergic rhinitis      Anxiety      PONV (postoperative nausea and vomiting)             Past Surgical History:     Past Surgical History:   Procedure Laterality Date     GRAFT BONE FROM ILIAC CREST N/A 3/6/2019    Procedure: Iliac Crest Autograft;  Surgeon: Santy Goyal MD;  Location: [facility]     OPTICAL TRACKING SYSTEM FUSION SPINE POSTERIOR LUMBAR TWO LEVELS N/A 3/6/2019    Procedure: Lumbar 4-5, L5-S1 Transforaminal Lumbar Interbody Fusion (Decompression and Instrumented Fusion) Lumbar 4-5, L5-S1 Smith Gramajo Osteotomy, Right Iliac Crest Autograft;  Surgeon: Santy Goyal MD;  Location: [facility]     TONSILLECTOMY & ADENOIDECTOMY       TUBAL LIGATION       TYMPANOSTOMY, LOCAL/TOPICAL ANESTHESIA              Social History:     Social History     Tobacco Use     Smoking status: Never Smoker     Smokeless tobacco: Never Used   Substance Use Topics     Alcohol use: Yes     Frequency: Monthly or less     Comment: rare            Family History:     Family History   Problem Relation Age of Onset     Anxiety Disorder Mother       Hyperlipidemia Mother      Depression Mother      Unknown/Adopted Father      Cancer Paternal Grandmother         unknown type      No Known Problems Half-Brother      Cancer Paternal Grandfather         unknown type     Unknown/Adopted Maternal Grandmother      Unknown/Adopted Maternal Grandfather             Allergies:     Allergies   Allergen Reactions     Animal Dander      Runny nose            Medications:     Current Outpatient Medications   Medication     acetaminophen (TYLENOL) 500 MG tablet     Biotin 5000 MCG CAPS     bisacodyl (DULCOLAX) 10 MG suppository     calcium carbonate (OS-SANDRA) 500 MG tablet     cholecalciferol (VITAMIN D3) 5000 units (125 mcg) capsule     diazepam (VALIUM) 5 MG tablet     Docosahexaenoic Acid (DHA OMEGA 3 PO)     fluticasone (FLONASE) 50 MCG/ACT nasal spray     gabapentin (NEURONTIN) 300 MG capsule     gabapentin (NEURONTIN) 300 MG capsule     gabapentin (NEURONTIN) 300 MG capsule     gabapentin (NEURONTIN) 300 MG capsule     methylPREDNISolone (MEDROL DOSEPAK) 4 MG tablet therapy pack     Misc Natural Products (GLUCOSAMINE CHONDROITIN TRIPLE PO)     polyethylene glycol (MIRALAX/GLYCOLAX) packet     propranolol ER (INDERAL LA) 120 MG 24 hr capsule     senna-docusate (SENOKOT-S/PERICOLACE) 8.6-50 MG tablet     traZODone (DESYREL) 50 MG tablet     venlafaxine (EFFEXOR-XR) 150 MG 24 hr capsule     venlafaxine (EFFEXOR-XR) 75 MG 24 hr capsule     No current facility-administered medications for this visit.              Review of Systems:   A focused musculoskeletal and neurologic ROS was performed with pertinent positives and negatives noted in the HPI.  Additional systems were also reviewed and are documented at the bottom of the note.         Physical Exam:   Vitals: There were no vitals taken for this visit.  Musculoskeletal, Neurologic, and Spine:          Lumbar Spine:    Appearance - No gross stepoffs or deformities    Motor -     L2-3: Hip flexion 5/5 R and 5/5 L strength           L3/4:  Knee extension R 5/5 and L 5/5 strength         L4/5:  Foot dorsiflexion R 5/5 L 5/5 and       EHL dorsiflexion R 5/5 L 5/5 strength         S1:  Plantarflexion/Peroneal Muscles  R 5/5 and L 5/5 strength    Sensation: intact to light touch L3-S1 distribution BLE      Neurologic:      REFLEXES Left Right                  Patella 1+ 1+   Ankle jerk 1+ 1+   Babinski No upgoing great toe No upgoing great toe   Clonus 0 beats 0 beats     Hip Exam:  No pain with hip log roll and no tenderness over the greater trochanters.    Alignment:  Patient stands with a neutral standing sagittal balance.           Imaging:   We ordered and independently reviewed new radiographs at this clinic visit. The results were discussed with the patient. Findings include:    AP and lateral lumbar films today show well-positioned implants with no evidence of complication       Assessment and Plan:     48 year old female with good surgical result at this point.  I think she can be advancing her cardio activities as tolerated.  I encouraged stationary bicycle.  Avoid lifting over 25 pounds and still try to avoid aggressive or repetitive bending or twisting activities.  From a clinical standpoint I would like her to follow-up with me in another 3 to 6 months with repeat lumbar x-rays to assess her fusion status at that time.           Respectfully,  Santy Goyal MD  Spine Surgery  [facility]    Answers for HPI/ROS submitted by the patient on 5/10/2019   General Symptoms: No  Skin Symptoms: No  HENT Symptoms: No  EYE SYMPTOMS: No  HEART SYMPTOMS: No  LUNG SYMPTOMS: No  INTESTINAL SYMPTOMS: No  URINARY SYMPTOMS: No  GYNECOLOGIC SYMPTOMS: No  BREAST SYMPTOMS: No  SKELETAL SYMPTOMS: No  BLOOD SYMPTOMS: No  NERVOUS SYSTEM SYMPTOMS: No  MENTAL HEALTH SYMPTOMS: No

## 2019-05-10 NOTE — LETTER
5/10/2019       RE: Negrita Robledo  [address] [place] [id number]     Dear Colleague,    Thank you for referring your patient, Negrita Robledo, to the [facility]. Please see a copy of my visit note below.    Spine Surgery Return Clinic Visit      Chief Complaint:   Follow Up (DOS 3/6/2019 Lumbar 4-5, L5-S1 Transforaminal Lumbar Interbody Fusion (Decompression and Instrumented Fusion) Lumbar 4-5, L5-S1 Smith Gramajo Osteotomy, Right Iliac Crest Autograft)      Interval HPI:  Symptom Profile Including: location of symptoms, onset, severity, exacerbating/alleviating factors, previous treatments:        Negrita Robledo is a 48 year old female who returns today status post 2 level lumbar interbody fusion.  She is doing quite well and she says the surgery is helped her quite a bit.  At my last visit with her a month ago she was having some intermittent radicular symptoms but these seem to have resolved for her and she has minimal back pain today.  She thinks surgery has helped her substantially.            Past Medical History:     Past Medical History:   Diagnosis Date     Allergic rhinitis      Anxiety      PONV (postoperative nausea and vomiting)             Past Surgical History:     Past Surgical History:   Procedure Laterality Date     GRAFT BONE FROM ILIAC CREST N/A 3/6/2019    Procedure: Iliac Crest Autograft;  Surgeon: Santy Goyal MD;  [facility]     OPTICAL TRACKING SYSTEM FUSION SPINE POSTERIOR LUMBAR TWO LEVELS N/A 3/6/2019    Procedure: Lumbar 4-5, L5-S1 Transforaminal Lumbar Interbody Fusion (Decompression and Instrumented Fusion) Lumbar 4-5, L5-S1 Smith Gramajo Osteotomy, Right Iliac Crest Autograft;  Surgeon: Santy Goyal MD;  [facility]     TONSILLECTOMY & ADENOIDECTOMY       TUBAL LIGATION       TYMPANOSTOMY, LOCAL/TOPICAL ANESTHESIA              Social History:     Social  History     Tobacco Use     Smoking status: Never Smoker     Smokeless tobacco: Never Used   Substance Use Topics     Alcohol use: Yes     Frequency: Monthly or less     Comment: rare            Family History:     Family History   Problem Relation Age of Onset     Anxiety Disorder Mother      Hyperlipidemia Mother      Depression Mother      Unknown/Adopted Father      Cancer Paternal Grandmother         unknown type      No Known Problems Half-Brother      Cancer Paternal Grandfather         unknown type     Unknown/Adopted Maternal Grandmother      Unknown/Adopted Maternal Grandfather             Allergies:     Allergies   Allergen Reactions     Animal Dander      Runny nose            Medications:     Current Outpatient Medications   Medication     acetaminophen (TYLENOL) 500 MG tablet     Biotin 5000 MCG CAPS     bisacodyl (DULCOLAX) 10 MG suppository     calcium carbonate (OS-SANDRA) 500 MG tablet     cholecalciferol (VITAMIN D3) 5000 units (125 mcg) capsule     diazepam (VALIUM) 5 MG tablet     Docosahexaenoic Acid (DHA OMEGA 3 PO)     fluticasone (FLONASE) 50 MCG/ACT nasal spray     gabapentin (NEURONTIN) 300 MG capsule     gabapentin (NEURONTIN) 300 MG capsule     gabapentin (NEURONTIN) 300 MG capsule     gabapentin (NEURONTIN) 300 MG capsule     methylPREDNISolone (MEDROL DOSEPAK) 4 MG tablet therapy pack     Misc Natural Products (GLUCOSAMINE CHONDROITIN TRIPLE PO)     polyethylene glycol (MIRALAX/GLYCOLAX) packet     propranolol ER (INDERAL LA) 120 MG 24 hr capsule     senna-docusate (SENOKOT-S/PERICOLACE) 8.6-50 MG tablet     traZODone (DESYREL) 50 MG tablet     venlafaxine (EFFEXOR-XR) 150 MG 24 hr capsule     venlafaxine (EFFEXOR-XR) 75 MG 24 hr capsule     No current facility-administered medications for this visit.              Review of Systems:   A focused musculoskeletal and neurologic ROS was performed with pertinent positives and negatives noted in the HPI.  Additional systems were also reviewed  and are documented at the bottom of the note.         Physical Exam:   Vitals: There were no vitals taken for this visit.  Musculoskeletal, Neurologic, and Spine:          Lumbar Spine:    Appearance - No gross stepoffs or deformities    Motor -     L2-3: Hip flexion 5/5 R and 5/5 L strength          L3/4:  Knee extension R 5/5 and L 5/5 strength         L4/5:  Foot dorsiflexion R 5/5 L 5/5 and       EHL dorsiflexion R 5/5 L 5/5 strength         S1:  Plantarflexion/Peroneal Muscles  R 5/5 and L 5/5 strength    Sensation: intact to light touch L3-S1 distribution BLE      Neurologic:      REFLEXES Left Right                  Patella 1+ 1+   Ankle jerk 1+ 1+   Babinski No upgoing great toe No upgoing great toe   Clonus 0 beats 0 beats     Hip Exam:  No pain with hip log roll and no tenderness over the greater trochanters.    Alignment:  Patient stands with a neutral standing sagittal balance.           Imaging:   We ordered and independently reviewed new radiographs at this clinic visit. The results were discussed with the patient. Findings include:    AP and lateral lumbar films today show well-positioned implants with no evidence of complication       Assessment and Plan:     48 year old female with good surgical result at this point.  I think she can be advancing her cardio activities as tolerated.  I encouraged stationary bicycle.  Avoid lifting over 25 pounds and still try to avoid aggressive or repetitive bending or twisting activities.  From a clinical standpoint I would like her to follow-up with me in another 3 to 6 months with repeat lumbar x-rays to assess her fusion status at that time.           Respectfully,  Santy Goyal MD  Spine Surgery  [facility]    Answers for HPI/ROS submitted by the patient on 5/10/2019   General Symptoms: No  Skin Symptoms: No  HENT Symptoms: No  EYE SYMPTOMS: No  HEART SYMPTOMS: No  LUNG SYMPTOMS: No  INTESTINAL SYMPTOMS: No  URINARY SYMPTOMS:  No  GYNECOLOGIC SYMPTOMS: No  BREAST SYMPTOMS: No  SKELETAL SYMPTOMS: No  BLOOD SYMPTOMS: No  NERVOUS SYSTEM SYMPTOMS: No  MENTAL HEALTH SYMPTOMS: No      Again, thank you for allowing me to participate in the care of your patient.      Sincerely,    Santy Goyal MD

## 2019-05-10 NOTE — NURSING NOTE
Reason For Visit:   Chief Complaint   Patient presents with     Follow Up     DOS 3/6/2019 Lumbar 4-5, L5-S1 Transforaminal Lumbar Interbody Fusion (Decompression and Instrumented Fusion) Lumbar 4-5, L5-S1 Smith Gramajo Osteotomy, Right Iliac Crest Autograft       Primary MD: Reta Castaneda  Ref. MD: Est    ?  No  Date of surgery: 3/6/2019  Type of surgery: Lumbar 4-5, L5-S1 Transforaminal Lumbar Interbody Fusion (Decompression and Instrumented Fusion) Lumbar 4-5, L5-S1 Smith Gramajo Osteotomy, Right Iliac Crest Autograft.  Smoker: No  Request smoking cessation information: No    There were no vitals taken for this visit.    Pain Assessment  Patient Currently in Pain: Yes  0-10 Pain Scale: 5  Primary Pain Location: Back  Pain Descriptors: Aching    Oswestry (DOMO) Questionnaire    OSWESTRY DISABILITY INDEX 4/19/2019   Count 8   Sum 22   Oswestry Score (%) 55            Neck Disability Index (NDI) Questionnaire    No flowsheet data found.           Visual Analog Pain Scale  Back Pain Scale 0-10: 5  Right leg pain: 3(Back of thigh area bilateral discomfort )  Left leg pain: 3    Promis 10 Assessment    PROMIS 10 5/10/2019   In general, would you say your health is: Good   In general, would you say your quality of life is: Good   In general, how would you rate your physical health? Good   In general, how would you rate your mental health, including your mood and your ability to think? Good   In general, how would you rate your satisfaction with your social activities and relationships? Good   In general, please rate how well you carry out your usual social activities and roles Good   To what extent are you able to carry out your everyday physical activities such as walking, climbing stairs, carrying groceries, or moving a chair? Moderately   How often have you been bothered by emotional problems such as feeling anxious, depressed or irritable? Sometimes   How would you rate your fatigue on average? Moderate    How would you rate your pain on average?   0 = No Pain  to  10 = Worst Imaginable Pain 5   In general, would you say your health is: 3   In general, would you say your quality of life is: 3   In general, how would you rate your physical health? 3   In general, how would you rate your mental health, including your mood and your ability to think? 3   In general, how would you rate your satisfaction with your social activities and relationships? 3   In general, please rate how well you carry out your usual social activities and roles. (This includes activities at home, at work and in your community, and responsibilities as a parent, child, spouse, employee, friend, etc.) 3   To what extent are you able to carry out your everyday physical activities such as walking, climbing stairs, carrying groceries, or moving a chair? 3   In the past 7 days, how often have you been bothered by emotional problems such as feeling anxious, depressed, or irritable? 3   In the past 7 days, how would you rate your fatigue on average? 3   In the past 7 days, how would you rate your pain on average, where 0 means no pain, and 10 means worst imaginable pain? 5   Global Mental Health Score 12   Global Physical Health Score 12   PROMIS TOTAL - SUBSCORES 24   Some recent data might be hidden                Ryan Jameson CMA

## 2019-09-12 ENCOUNTER — DOCUMENTATION ONLY (OUTPATIENT)
Dept: CARE COORDINATION | Facility: CLINIC | Age: 49
End: 2019-09-12

## 2019-09-16 DIAGNOSIS — M54.50 LUMBAR PAIN: Primary | ICD-10-CM

## 2019-09-16 ASSESSMENT — ENCOUNTER SYMPTOMS
CONSTIPATION: 1
BLOOD IN STOOL: 0
EYE REDNESS: 0
EYE PAIN: 0
VOMITING: 0
DIARRHEA: 0
HEARTBURN: 0
BOWEL INCONTINENCE: 0
NAUSEA: 0
EYE IRRITATION: 1
EYE WATERING: 0
ABDOMINAL PAIN: 0
JAUNDICE: 0
BLOATING: 1
RECTAL PAIN: 0
DOUBLE VISION: 0

## 2019-09-17 ENCOUNTER — OFFICE VISIT (OUTPATIENT)
Dept: ORTHOPEDICS | Facility: CLINIC | Age: 49
End: 2019-09-17
Payer: COMMERCIAL

## 2019-09-17 ENCOUNTER — ANCILLARY PROCEDURE (OUTPATIENT)
Dept: CT IMAGING | Facility: CLINIC | Age: 49
End: 2019-09-17
Attending: ORTHOPAEDIC SURGERY
Payer: COMMERCIAL

## 2019-09-17 VITALS — WEIGHT: 192 LBS | HEIGHT: 67 IN | BODY MASS INDEX: 30.13 KG/M2

## 2019-09-17 DIAGNOSIS — Z98.890 STATUS POST LUMBAR SPINE SURGERY FOR DECOMPRESSION OF SPINAL CORD: Primary | ICD-10-CM

## 2019-09-17 DIAGNOSIS — Z98.890 STATUS POST LUMBAR SPINE SURGERY FOR DECOMPRESSION OF SPINAL CORD: ICD-10-CM

## 2019-09-17 LAB — RADIOLOGIST FLAGS: NORMAL

## 2019-09-17 ASSESSMENT — MIFFLIN-ST. JEOR: SCORE: 1523.54

## 2019-09-17 NOTE — PROGRESS NOTES
Spine Surgery Return Clinic Visit      Chief Complaint:   RECHECK (6 month follow up )      Interval HPI:  Symptom Profile Including: location of symptoms, onset, severity, exacerbating/alleviating factors, previous treatments:        Negrita Robledo is a 49 year old female who returns today 6 months status post L4-S1 posterior interbody fusion.  She is doing great.  She says the surgery help with all of her symptoms.  She does not have any limitations.  She is back to work full-time.  She is really quite pleased with the surgery today.            Past Medical History:     Past Medical History:   Diagnosis Date     Allergic rhinitis      Anxiety      PONV (postoperative nausea and vomiting)             Past Surgical History:     Past Surgical History:   Procedure Laterality Date     GRAFT BONE FROM ILIAC CREST N/A 3/6/2019    Procedure: Iliac Crest Autograft;  Surgeon: Santy Goyal MD;  Location: [facility]     OPTICAL TRACKING SYSTEM FUSION SPINE POSTERIOR LUMBAR TWO LEVELS N/A 3/6/2019    Procedure: Lumbar 4-5, L5-S1 Transforaminal Lumbar Interbody Fusion (Decompression and Instrumented Fusion) Lumbar 4-5, L5-S1 Smith Gramajo Osteotomy, Right Iliac Crest Autograft;  Surgeon: Satny Goyal MD;  Location: [facility]     TONSILLECTOMY & ADENOIDECTOMY       TUBAL LIGATION       TYMPANOSTOMY, LOCAL/TOPICAL ANESTHESIA              Social History:     Social History     Tobacco Use     Smoking status: Never Smoker     Smokeless tobacco: Never Used   Substance Use Topics     Alcohol use: Yes     Frequency: Monthly or less     Comment: rare            Family History:     Family History   Problem Relation Age of Onset     Anxiety Disorder Mother      Hyperlipidemia Mother      Depression Mother      Unknown/Adopted Father      Cancer Paternal Grandmother         unknown type      No Known Problems Half-Brother      Cancer Paternal Grandfather         unknown type     Unknown/Adopted Maternal  "Grandmother      Unknown/Adopted Maternal Grandfather             Allergies:     Allergies   Allergen Reactions     Animal Dander      Runny nose            Medications:     Current Outpatient Medications   Medication     acetaminophen (TYLENOL) 500 MG tablet     Biotin 5000 MCG CAPS     bisacodyl (DULCOLAX) 10 MG suppository     calcium carbonate (OS-SANDRA) 500 MG tablet     cholecalciferol (VITAMIN D3) 5000 units (125 mcg) capsule     diazepam (VALIUM) 5 MG tablet     Docosahexaenoic Acid (DHA OMEGA 3 PO)     fluticasone (FLONASE) 50 MCG/ACT nasal spray     gabapentin (NEURONTIN) 300 MG capsule     gabapentin (NEURONTIN) 300 MG capsule     gabapentin (NEURONTIN) 300 MG capsule     gabapentin (NEURONTIN) 300 MG capsule     methylPREDNISolone (MEDROL DOSEPAK) 4 MG tablet therapy pack     Misc Natural Products (GLUCOSAMINE CHONDROITIN TRIPLE PO)     polyethylene glycol (MIRALAX/GLYCOLAX) packet     propranolol ER (INDERAL LA) 120 MG 24 hr capsule     senna-docusate (SENOKOT-S/PERICOLACE) 8.6-50 MG tablet     traZODone (DESYREL) 50 MG tablet     venlafaxine (EFFEXOR-XR) 150 MG 24 hr capsule     venlafaxine (EFFEXOR-XR) 75 MG 24 hr capsule     No current facility-administered medications for this visit.              Review of Systems:   A focused musculoskeletal and neurologic ROS was performed with pertinent positives and negatives noted in the HPI.  Additional systems were also reviewed and are documented at the bottom of the note.         Physical Exam:   Vitals: Ht 1.702 m (5' 7\")   Wt 87.1 kg (192 lb)   BMI 30.07 kg/m    Musculoskeletal, Neurologic, and Spine:        Lumbar Spine:    Appearance - No gross stepoffs or deformities    Motor -     L2-3: Hip flexion 5/5 R and 5/5 L strength          L3/4:  Knee extension R 5/5 and L 5/5 strength         L4/5:  Foot dorsiflexion R 5/5 L 5/5 and       EHL dorsiflexion R 4/5 L 4/5 strength         S1:  Plantarflexion/Peroneal Muscles  R 5/5 and L 5/5 " strength    Sensation: intact to light touch L3-S1 distribution BLE      Neurologic:      REFLEXES Left Right   Biceps 1+ 1+   Triceps 1+ 1+   Brachioradialis 1+ 1+   Patella 1+ 1+   Ankle jerk 1+ 1+   Babinski No upgoing great toe No upgoing great toe   Clonus 0 beats 0 beats     Hip Exam:  No pain with hip log roll and no tenderness over the greater trochanters.    Alignment:  Patient stands with a neutral standing sagittal balance.           Imaging:   We ordered and independently reviewed new radiographs at this clinic visit. The results were discussed with the patient. Findings include: CT scan September 17, 2019 shows no obvious screw loosening fusion looks solid in the disc space on the coronal views at L4-5 L5-S1 is more indeterminate on the sagittal views at L5-S1 I believe there may be some fusion through the intertransverse region although this is a little bit hard to say most convincing cut would be series 9 image 37       Assessment and Plan:     49 year old female with excellent clinical result at this point status post L4-S1 fusion surgery.    She is really doing quite well at this point.  No residual symptoms.  Her CT scan is a little bit indeterminant at the L5-S1 level but given that she has A[place] no symptoms I think it is fine to go to as needed follow-up.  If she were to develop worsening back pain in the future I would want her to contact me.  At this point I do not think that further activity restrictions will make much of a difference for her.  I am okay with her advancing as tolerated.  If she develops significant back pain we might investigate that L5-S1 level a little bit further but she will let me know if anything like this occurs.  Otherwise I am very pleased that she has had an excellent outcome.        Respectfully,  Santy Goyal MD  Spine Surgery  [facility]    Answers for HPI/ROS submitted by the patient on 9/16/2019   General Symptoms: No  Skin Symptoms:  No  HENT Symptoms: No  EYE SYMPTOMS: Yes  HEART SYMPTOMS: No  LUNG SYMPTOMS: No  INTESTINAL SYMPTOMS: Yes  URINARY SYMPTOMS: No  GYNECOLOGIC SYMPTOMS: No  BREAST SYMPTOMS: No  SKELETAL SYMPTOMS: No  BLOOD SYMPTOMS: No  NERVOUS SYSTEM SYMPTOMS: No  MENTAL HEALTH SYMPTOMS: No  Eye pain: No  Vision loss: Yes  Dry eyes: Yes  Watery eyes: No  Eye bulging: No  Double vision: No  Flashing of lights: No  Spots: Yes  Floaters: No  Redness: No  Crossed eyes: No  Tunnel Vision: No  Yellowing of eyes: No  Eye irritation: Yes  Heart burn or indigestion: No  Nausea: No  Vomiting: No  Abdominal pain: No  Bloating: Yes  Constipation: Yes  Diarrhea: No  Blood in stool: No  Black stools: No  Rectal or Anal pain: No  Fecal incontinence: No  Yellowing of skin or eyes: No  Vomit with blood: No  Change in stools: No

## 2019-09-17 NOTE — LETTER
9/17/2019       RE: Negrita Robledo  [address] [address]     Dear Colleague,    Thank you for referring your patient, Negrita Robledo, to the [facility] at [facility]. Please see a copy of my visit note below.    Spine Surgery Return Clinic Visit      Chief Complaint:   RECHECK (6 month follow up )      Interval HPI:  Symptom Profile Including: location of symptoms, onset, severity, exacerbating/alleviating factors, previous treatments:        Negrita Robledo is a 49 year old female who returns today 6 months status post L4-S1 posterior interbody fusion.  She is doing great.  She says the surgery help with all of her symptoms.  She does not have any limitations.  She is back to work full-time.  She is really quite pleased with the surgery today.            Past Medical History:     Past Medical History:   Diagnosis Date     Allergic rhinitis      Anxiety      PONV (postoperative nausea and vomiting)             Past Surgical History:     Past Surgical History:   Procedure Laterality Date     GRAFT BONE FROM ILIAC CREST N/A 3/6/2019    Procedure: Iliac Crest Autograft;  Surgeon: Santy Goyal MD;  [facility]     OPTICAL TRACKING SYSTEM FUSION SPINE POSTERIOR LUMBAR TWO LEVELS N/A 3/6/2019    Procedure: Lumbar 4-5, L5-S1 Transforaminal Lumbar Interbody Fusion (Decompression and Instrumented Fusion) Lumbar 4-5, L5-S1 Smith Gramajo Osteotomy, Right Iliac Crest Autograft;  Surgeon: Santy Goyal MD;  [facility]     TONSILLECTOMY & ADENOIDECTOMY       TUBAL LIGATION       TYMPANOSTOMY, LOCAL/TOPICAL ANESTHESIA              Social History:     Social History     Tobacco Use     Smoking status: Never Smoker     Smokeless tobacco: Never Used   Substance Use Topics     Alcohol use: Yes     Frequency: Monthly or less     Comment: rare            Family History:     Family History   Problem Relation Age of  "Onset     Anxiety Disorder Mother      Hyperlipidemia Mother      Depression Mother      Unknown/Adopted Father      Cancer Paternal Grandmother         unknown type      No Known Problems Half-Brother      Cancer Paternal Grandfather         unknown type     Unknown/Adopted Maternal Grandmother      Unknown/Adopted Maternal Grandfather             Allergies:     Allergies   Allergen Reactions     Animal Dander      Runny nose            Medications:     Current Outpatient Medications   Medication     acetaminophen (TYLENOL) 500 MG tablet     Biotin 5000 MCG CAPS     bisacodyl (DULCOLAX) 10 MG suppository     calcium carbonate (OS-SANDRA) 500 MG tablet     cholecalciferol (VITAMIN D3) 5000 units (125 mcg) capsule     diazepam (VALIUM) 5 MG tablet     Docosahexaenoic Acid (DHA OMEGA 3 PO)     fluticasone (FLONASE) 50 MCG/ACT nasal spray     gabapentin (NEURONTIN) 300 MG capsule     gabapentin (NEURONTIN) 300 MG capsule     gabapentin (NEURONTIN) 300 MG capsule     gabapentin (NEURONTIN) 300 MG capsule     methylPREDNISolone (MEDROL DOSEPAK) 4 MG tablet therapy pack     Misc Natural Products (GLUCOSAMINE CHONDROITIN TRIPLE PO)     polyethylene glycol (MIRALAX/GLYCOLAX) packet     propranolol ER (INDERAL LA) 120 MG 24 hr capsule     senna-docusate (SENOKOT-S/PERICOLACE) 8.6-50 MG tablet     traZODone (DESYREL) 50 MG tablet     venlafaxine (EFFEXOR-XR) 150 MG 24 hr capsule     venlafaxine (EFFEXOR-XR) 75 MG 24 hr capsule     No current facility-administered medications for this visit.              Review of Systems:   A focused musculoskeletal and neurologic ROS was performed with pertinent positives and negatives noted in the HPI.  Additional systems were also reviewed and are documented at the bottom of the note.         Physical Exam:   Vitals: Ht 1.702 m (5' 7\")   Wt 87.1 kg (192 lb)   BMI 30.07 kg/m     Musculoskeletal, Neurologic, and Spine:        Lumbar Spine:    Appearance - No gross stepoffs or " deformities    Motor -     L2-3: Hip flexion 5/5 R and 5/5 L strength          L3/4:  Knee extension R 5/5 and L 5/5 strength         L4/5:  Foot dorsiflexion R 5/5 L 5/5 and       EHL dorsiflexion R 4/5 L 4/5 strength         S1:  Plantarflexion/Peroneal Muscles  R 5/5 and L 5/5 strength    Sensation: intact to light touch L3-S1 distribution BLE      Neurologic:      REFLEXES Left Right   Biceps 1+ 1+   Triceps 1+ 1+   Brachioradialis 1+ 1+   Patella 1+ 1+   Ankle jerk 1+ 1+   Babinski No upgoing great toe No upgoing great toe   Clonus 0 beats 0 beats     Hip Exam:  No pain with hip log roll and no tenderness over the greater trochanters.    Alignment:  Patient stands with a neutral standing sagittal balance.         Imaging:   We ordered and independently reviewed new radiographs at this clinic visit. The results were discussed with the patient. Findings include: CT scan September 17, 2019 shows no obvious screw loosening fusion looks solid in the disc space on the coronal views at L4-5 L5-S1 is more indeterminate on the sagittal views at L5-S1 I believe there may be some fusion through the intertransverse region although this is a little bit hard to say most convincing cut would be series 9 image 37     Assessment and Plan:     49 year old female with excellent clinical result at this point status post L4-S1 fusion surgery.    She is really doing quite well at this point.  No residual symptoms.  Her CT scan is a little bit indeterminant at the L5-S1 level but given that she has [place] no symptoms I think it is fine to go to as needed follow-up.  If she were to develop worsening back pain in the future I would want her to contact me.  At this point I do not think that further activity restrictions will make much of a difference for her.  I am okay with her advancing as tolerated.  If she develops significant back pain we might investigate that L5-S1 level a little bit further but she will let me know if anything  like this occurs.  Otherwise I am very pleased that she has had an excellent outcome.    Respectfully,  Santy Goyal MD  Spine Surgery  [facility]

## 2019-09-17 NOTE — NURSING NOTE
"Reason For Visit:   Chief Complaint   Patient presents with     RECHECK     6 month follow up        Primary MD: Reta Castaneda  Ref. MD: Self   Date of surgery: Dr. Goyal   Type of surgery: Dr. Goyal .  Smoker: No  Request smoking cessation information: No    Ht 1.702 m (5' 7\")   Wt 87.1 kg (192 lb)   BMI 30.07 kg/m           Oswestry (DOMO) Questionnaire    OSWESTRY DISABILITY INDEX 9/16/2019   Count 10   Sum 5   Oswestry Score (%) 10            Neck Disability Index (NDI) Questionnaire    No flowsheet data found.                Promis 10 Assessment    PROMIS 10 9/16/2019   In general, would you say your health is: Very good   In general, would you say your quality of life is: Good   In general, how would you rate your physical health? Good   In general, how would you rate your mental health, including your mood and your ability to think? Good   In general, how would you rate your satisfaction with your social activities and relationships? Good   In general, please rate how well you carry out your usual social activities and roles Fair   To what extent are you able to carry out your everyday physical activities such as walking, climbing stairs, carrying groceries, or moving a chair? Mostly   How often have you been bothered by emotional problems such as feeling anxious, depressed or irritable? Often   How would you rate your fatigue on average? Moderate   How would you rate your pain on average?   0 = No Pain  to  10 = Worst Imaginable Pain 3   In general, would you say your health is: 4   In general, would you say your quality of life is: 3   In general, how would you rate your physical health? 3   In general, how would you rate your mental health, including your mood and your ability to think? 3   In general, how would you rate your satisfaction with your social activities and relationships? 3   In general, please rate how well you carry out your usual social activities and roles. (This includes activities at " home, at work and in your community, and responsibilities as a parent, child, spouse, employee, friend, etc.) 2   To what extent are you able to carry out your everyday physical activities such as walking, climbing stairs, carrying groceries, or moving a chair? 4   In the past 7 days, how often have you been bothered by emotional problems such as feeling anxious, depressed, or irritable? 4   In the past 7 days, how would you rate your fatigue on average? 3   In the past 7 days, how would you rate your pain on average, where 0 means no pain, and 10 means worst imaginable pain? 3   Global Mental Health Score 11   Global Physical Health Score 14   PROMIS TOTAL - SUBSCORES 25   Some recent data might be hidden                Yang Kapoor ATC

## 2019-11-15 NOTE — PLAN OF CARE
Patient is requesting a 2 month tramadol refill. States Dr usually prescribes 3 months worth, but only sent one month worth last time. [facility] in [place].   Pt. discharged at 1015 to home and left with personal belongings. Pt. received complete discharge paperwork and medications as filled by discharge pharmacy. Pt. was given times of last dose for all discharge medications in writing on discharge medication sheets. Discharge teaching included medication administration, pain management, activity restrictions, dressing changes, and signs and symptoms of infection. Pt to follow up with Dr. Goyal in April. Pt. had no further questions at the time of discharge and no unmet needs were identified.

## 2020-03-11 ENCOUNTER — HEALTH MAINTENANCE LETTER (OUTPATIENT)
Age: 50
End: 2020-03-11

## 2021-01-03 ENCOUNTER — HEALTH MAINTENANCE LETTER (OUTPATIENT)
Age: 51
End: 2021-01-03

## 2021-04-25 ENCOUNTER — HEALTH MAINTENANCE LETTER (OUTPATIENT)
Age: 51
End: 2021-04-25

## 2021-10-10 ENCOUNTER — HEALTH MAINTENANCE LETTER (OUTPATIENT)
Age: 51
End: 2021-10-10

## 2022-01-29 ENCOUNTER — HEALTH MAINTENANCE LETTER (OUTPATIENT)
Age: 52
End: 2022-01-29

## 2022-05-21 ENCOUNTER — HEALTH MAINTENANCE LETTER (OUTPATIENT)
Age: 52
End: 2022-05-21

## 2022-09-18 ENCOUNTER — HEALTH MAINTENANCE LETTER (OUTPATIENT)
Age: 52
End: 2022-09-18

## 2023-05-07 ENCOUNTER — HEALTH MAINTENANCE LETTER (OUTPATIENT)
Age: 53
End: 2023-05-07

## 2023-06-04 ENCOUNTER — HEALTH MAINTENANCE LETTER (OUTPATIENT)
Age: 53
End: 2023-06-04

## 2023-08-26 ENCOUNTER — HOSPITAL ENCOUNTER (EMERGENCY)
Facility: HOSPITAL | Age: 53
Discharge: HOME | End: 2023-08-26
Attending: EMERGENCY MEDICINE
Payer: COMMERCIAL

## 2023-08-26 ENCOUNTER — APPOINTMENT (OUTPATIENT)
Dept: CT IMAGING | Facility: HOSPITAL | Age: 53
End: 2023-08-26
Payer: COMMERCIAL

## 2023-08-26 VITALS
DIASTOLIC BLOOD PRESSURE: 62 MMHG | HEIGHT: 67 IN | TEMPERATURE: 98.1 F | OXYGEN SATURATION: 95 % | WEIGHT: 200.4 LBS | BODY MASS INDEX: 31.45 KG/M2 | HEART RATE: 65 BPM | SYSTOLIC BLOOD PRESSURE: 102 MMHG | RESPIRATION RATE: 15 BRPM

## 2023-08-26 DIAGNOSIS — S02.2XXA NASAL BONE FRACTURE: ICD-10-CM

## 2023-08-26 DIAGNOSIS — S01.21XA LACERATION OF NOSE, INITIAL ENCOUNTER: Primary | ICD-10-CM

## 2023-08-26 PROCEDURE — 6370000100 HC RX 637 (ALT 250 FOR IP): Performed by: NURSE PRACTITIONER

## 2023-08-26 PROCEDURE — 99283 EMERGENCY DEPT VISIT LOW MDM: CPT | Performed by: EMERGENCY MEDICINE

## 2023-08-26 PROCEDURE — 12052 INTMD RPR FACE/MM 2.6-5.0 CM: CPT

## 2023-08-26 PROCEDURE — G1004 CDSM NDSC: HCPCS

## 2023-08-26 PROCEDURE — 6370000100 HC RX 637 (ALT 250 FOR IP): Performed by: EMERGENCY MEDICINE

## 2023-08-26 PROCEDURE — 70450 CT HEAD/BRAIN W/O DYE: CPT | Mod: ME

## 2023-08-26 PROCEDURE — 99243 OFF/OP CNSLTJ NEW/EST LOW 30: CPT | Mod: 25 | Performed by: OTOLARYNGOLOGY

## 2023-08-26 PROCEDURE — 12052 INTMD RPR FACE/MM 2.6-5.0 CM: CPT | Performed by: OTOLARYNGOLOGY

## 2023-08-26 RX ORDER — VENLAFAXINE HYDROCHLORIDE 75 MG/1
75 CAPSULE, EXTENDED RELEASE ORAL DAILY
COMMUNITY

## 2023-08-26 RX ORDER — BACITRACIN ZINC 500 UNIT/G
OINTMENT (GRAM) TOPICAL
Status: DISCONTINUED
Start: 2023-08-26 | End: 2023-08-26 | Stop reason: HOSPADM

## 2023-08-26 RX ORDER — ONDANSETRON 4 MG/1
4 TABLET, ORALLY DISINTEGRATING ORAL ONCE
Status: COMPLETED | OUTPATIENT
Start: 2023-08-26 | End: 2023-08-26

## 2023-08-26 RX ORDER — HYDROCODONE BITARTRATE AND ACETAMINOPHEN 5; 325 MG/1; MG/1
2 TABLET ORAL ONCE
Status: COMPLETED | OUTPATIENT
Start: 2023-08-26 | End: 2023-08-26

## 2023-08-26 RX ADMIN — Medication 1 APPLICATION: at 14:45

## 2023-08-26 RX ADMIN — ONDANSETRON 4 MG: 4 TABLET, ORALLY DISINTEGRATING ORAL at 13:59

## 2023-08-26 RX ADMIN — HYDROCODONE BITARTRATE AND ACETAMINOPHEN 2 TABLET: 5; 325 TABLET ORAL at 13:59

## 2023-08-26 NOTE — OP NOTE
August 26, 2023    OPERATIVE NOTE    Preoperative diagnosis: 4 cm complex laceration of the external nose not involving either nostril but starting at the bridge of the nose and transversing inferiorly to the nasal tip.    Postoperative diagnosis: Same    Operative procedure: Inspection debridement irrigation and layered complex closure of the 4 cm nasal laceration.    Surgeon: Julius Castro MD    Anesthesia: Local anesthesia using 1% lidocaine with epinephrine-9 cc    Blood loss: Minimal    Drains: None    Complications: None    Description of the Procedure: Discussed the risks and benefits of procedure with the patient and her .  Put her in a comfortable position and explained everything to her.  Injected the nose with 9 cc of 1% lidocaine with epinephrine.  I did not just inject the wound but I did a V2 block bilaterally and also injected the bridge of the nose and the tip of the nose.    Once the nose was anesthetized I cleaned the nose circumferentially with multiple layers of Betadine solution.  I then took Q-tips and cleaned the inside each nostrils with Betadine.  I then took a Q-tip and cleaned along the laceration and then irrigated with saline.    She was then prepped and sterilely draped.  Subcutaneous interrupted 5-0 chromic sutures were placed to reapproximate the dermal layers of the wound.  Approximately 4 or 5 these were placed in an interrupted fashion.  Then the skin was closed with 5-0 and 6-0 Prolene sutures.    Antibiotic ointment was then applied.  Wound care instructions were given.  She is to have half of the sutures removed in 5 days.  She understands she needs to keep the wound clean and dry for 48 hours.  All questions were answered and she was discharged.       Julius Castro MD, FACS
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
## 2023-08-26 NOTE — DISCHARGE INSTRUCTIONS
Follow-up with Dr. Castro as scheduled in clinic to monitor wound healing.    Tylenol (acetaminophen) 650 mg every 6 hours as needed for pain or fever.  Ibuprofen 600 mg every 6 hours as needed for pain or fever.  Take food with ibuprofen to protect your stomach.    Apply ice pack for 20 minutes as needed to help decrease pain and swelling.

## 2023-08-30 ENCOUNTER — TELEPHONE (OUTPATIENT)
Dept: OTOLARYNGOLOGY | Facility: CLINIC | Age: 53
End: 2023-08-30
Payer: COMMERCIAL

## 2023-08-30 NOTE — TELEPHONE ENCOUNTER
Caller would like to discuss medication     Patient: Rody Chow    Callback Number: [phone]    Best Availability: anytime    Additional Info: needing a med refill, pt was seen in the ER by Dr Castro and states that there was a script that was to be sent in but it was not received, needs script to be sent to [facility] on [facility], please contact the patient once the script has been sent     I advised caller of a callback by 48 hours.

## 2023-08-31 NOTE — TELEPHONE ENCOUNTER
I advised the patient her antibiotic has been sent in and she needs to be seen Tuesday or Wednesday to have her sutures removed. She stated she was going back home on Saturday, 9/2/23 and would have them removed there.

## 2023-08-31 NOTE — TELEPHONE ENCOUNTER
I sent in Keflex for the patient to the [facility] on [address].  Patient should make sure she has an appointment to be seen either Tuesday or Wednesday for suture removal

## (undated) DEVICE — STPL SKIN 35W ROTATING HEAD PRW35

## (undated) DEVICE — DRAPE LAP W/ARMBOARD 29410

## (undated) DEVICE — SPONGE COTTONOID 1X1" 80-1403

## (undated) DEVICE — SOL ISOPROPYL RUBBING ALCOHOL USP 70% 4OZ HDX-20 I0020

## (undated) DEVICE — GLOVE PROTEXIS W/NEU-THERA 7.5  2D73TE75

## (undated) DEVICE — LINEN BACK PACK 5440

## (undated) DEVICE — MIDAS REX DISSECTING TOOL 4MM BALL 140MM 14BA40

## (undated) DEVICE — ESU GROUND PAD UNIVERSAL W/O CORD

## (undated) DEVICE — BLADE BONE MILL STRK 5.0MM MED 5400-701-000

## (undated) DEVICE — Device

## (undated) DEVICE — LINEN TOWEL PACK X30 5481

## (undated) DEVICE — DRSG KERLIX FLUFFS X5

## (undated) DEVICE — TUBING SUCTION MEDI-VAC SOFT 3/16"X20' N520A

## (undated) DEVICE — DRAPE MAYO STAND 23X54 8337

## (undated) DEVICE — CELL SAVER

## (undated) DEVICE — NDL 20GA 1.5"

## (undated) DEVICE — DRAPE POUCH INSTRUMENT 1018

## (undated) DEVICE — POSITIONER ARMBOARD FOAM 1PAIR LF FP-ARMB1

## (undated) DEVICE — COVER CAMERA IN-LIGHT DISP LT-C02

## (undated) DEVICE — SYR 03ML LL W/O NDL 309657

## (undated) DEVICE — SPECIMEN CONTAINER 5OZ STERILE 2600SA

## (undated) DEVICE — DRAPE O ARM TUBE 9732722

## (undated) DEVICE — SU MONOCRYL 2-0 SH 27" UND Y417H

## (undated) DEVICE — GLOVE PROTEXIS BLUE W/NEU-THERA 8.0  2D73EB80

## (undated) DEVICE — NDL 22GA 1.5"

## (undated) DEVICE — CATH TRAY FOLEY SURESTEP 16FR WDRAIN BAG STLK LATEX A300316A

## (undated) DEVICE — BONE WAX 2.5GM W31G

## (undated) DEVICE — SPONGE KITTNER 31001010

## (undated) DEVICE — MARKER SPHERZ PACK 5

## (undated) DEVICE — DRAPE STERI TOWEL LG 1010

## (undated) DEVICE — MIDAS REX DIFFUSER LUBRICANT LEGEND PA100-A

## (undated) DEVICE — GOWN IMPERVIOUS SPECIALTY XLG/XLONG 32474

## (undated) DEVICE — PREP CHLORAPREP 26ML TINTED ORANGE  260815

## (undated) DEVICE — SU VICRYL 0 CT-1 CR 8X18" J740D

## (undated) DEVICE — DRAIN JACKSON PRATT ROUND W/TROCAR 19FR JP-HUR195

## (undated) DEVICE — DRSG AQUACEL AG 3.5X9.75" HYDROFIBER 412011

## (undated) DEVICE — ESU ELEC BLADE 2.75" COATED/INSULATED E1455

## (undated) DEVICE — DRSG DRAIN 4X4" 7086

## (undated) DEVICE — DRSG TEGADERM 4X4 3/4" 1626W

## (undated) DEVICE — SU VICRYL 1 CT-1 36" J347H

## (undated) DEVICE — DECANTER TRANSFER DEVICE 2008S

## (undated) DEVICE — TAPE MEDIPORE 4"X2YD 2864

## (undated) DEVICE — SYR 50ML LL W/O NDL 309653

## (undated) DEVICE — BASIN SET MAJOR

## (undated) DEVICE — SPONGE SURGIFOAM 100 1974

## (undated) DEVICE — PACK SET-UP STD 9102

## (undated) DEVICE — BRUSH SURGICAL SCRUB W/4% CHLORHEXIDINE GLUCONATE SOL 4458A

## (undated) DEVICE — SPONGE COTTONOID 1/2X1/2" 80-1400

## (undated) DEVICE — SU DERMABOND ADVANCED .7ML DNX12

## (undated) DEVICE — SU ETHILON 3-0 PS-1 18" 1663H

## (undated) DEVICE — DRAIN JACKSON PRATT RESERVOIR 100ML SU130-1305

## (undated) DEVICE — SUCTION MANIFOLD DORNOCH ULTRA CART UL-CL500

## (undated) DEVICE — DRAIN HEMOVAC RESERVOIR KIT 10FR 1/8" MED 00-2550-002-10

## (undated) DEVICE — WIPES FOLEY CARE SURESTEP PROVON DFC100

## (undated) DEVICE — STRAP KNEE/BODY 31143004

## (undated) DEVICE — GLOVE PROTEXIS BLUE W/NEU-THERA 8.5  2D73EB85

## (undated) DEVICE — NDL SPINAL 18GA 3.5" 405184

## (undated) DEVICE — SOL WATER IRRIG 1000ML BOTTLE 2F7114

## (undated) DEVICE — GLOVE PROTEXIS W/NEU-THERA 8.5  2D73TE85

## (undated) DEVICE — SU MONOCRYL 3-0 PS-2 18" UND Y497G

## (undated) DEVICE — ESU CORD BIPOLAR GREEN 10-4000

## (undated) RX ORDER — FENTANYL CITRATE 50 UG/ML
INJECTION, SOLUTION INTRAMUSCULAR; INTRAVENOUS
Status: DISPENSED
Start: 2019-03-06

## (undated) RX ORDER — LIDOCAINE HYDROCHLORIDE 20 MG/ML
INJECTION, SOLUTION EPIDURAL; INFILTRATION; INTRACAUDAL; PERINEURAL
Status: DISPENSED
Start: 2019-03-06

## (undated) RX ORDER — PROPOFOL 10 MG/ML
INJECTION, EMULSION INTRAVENOUS
Status: DISPENSED
Start: 2019-03-06

## (undated) RX ORDER — HYDROMORPHONE HYDROCHLORIDE 1 MG/ML
INJECTION, SOLUTION INTRAMUSCULAR; INTRAVENOUS; SUBCUTANEOUS
Status: DISPENSED
Start: 2019-03-06

## (undated) RX ORDER — DEXAMETHASONE SODIUM PHOSPHATE 4 MG/ML
INJECTION, SOLUTION INTRA-ARTICULAR; INTRALESIONAL; INTRAMUSCULAR; INTRAVENOUS; SOFT TISSUE
Status: DISPENSED
Start: 2019-03-06

## (undated) RX ORDER — CEFAZOLIN SODIUM IN 0.9 % NACL 3 G/100 ML
INTRAVENOUS SOLUTION, PIGGYBACK (ML) INTRAVENOUS
Status: DISPENSED
Start: 2019-03-06

## (undated) RX ORDER — KETAMINE HCL IN 0.9 % NACL 50 MG/5 ML
SYRINGE (ML) INTRAVENOUS
Status: DISPENSED
Start: 2019-03-06

## (undated) RX ORDER — SODIUM CHLORIDE, SODIUM LACTATE, POTASSIUM CHLORIDE, CALCIUM CHLORIDE 600; 310; 30; 20 MG/100ML; MG/100ML; MG/100ML; MG/100ML
INJECTION, SOLUTION INTRAVENOUS
Status: DISPENSED
Start: 2019-03-06

## (undated) RX ORDER — CEFAZOLIN SODIUM 2 G/100ML
INJECTION, SOLUTION INTRAVENOUS
Status: DISPENSED
Start: 2019-03-06

## (undated) RX ORDER — EPHEDRINE SULFATE 50 MG/ML
INJECTION, SOLUTION INTRAMUSCULAR; INTRAVENOUS; SUBCUTANEOUS
Status: DISPENSED
Start: 2019-03-06

## (undated) RX ORDER — PHENYLEPHRINE HCL IN 0.9% NACL 1 MG/10 ML
SYRINGE (ML) INTRAVENOUS
Status: DISPENSED
Start: 2019-03-06

## (undated) RX ORDER — CEFAZOLIN SODIUM 1 G
VIAL (EA) INJECTION
Status: DISPENSED
Start: 2019-03-06

## (undated) RX ORDER — BUPIVACAINE HYDROCHLORIDE AND EPINEPHRINE 2.5; 5 MG/ML; UG/ML
INJECTION, SOLUTION INFILTRATION; PERINEURAL
Status: DISPENSED
Start: 2019-03-06

## (undated) RX ORDER — GLYCOPYRROLATE 0.2 MG/ML
INJECTION, SOLUTION INTRAMUSCULAR; INTRAVENOUS
Status: DISPENSED
Start: 2019-03-06

## (undated) RX ORDER — SCOLOPAMINE TRANSDERMAL SYSTEM 1 MG/1
PATCH, EXTENDED RELEASE TRANSDERMAL
Status: DISPENSED
Start: 2019-03-06

## (undated) RX ORDER — VANCOMYCIN HYDROCHLORIDE 1 G/20ML
INJECTION, POWDER, LYOPHILIZED, FOR SOLUTION INTRAVENOUS
Status: DISPENSED
Start: 2019-03-06